# Patient Record
Sex: FEMALE | Race: WHITE | NOT HISPANIC OR LATINO | Employment: FULL TIME | URBAN - NONMETROPOLITAN AREA
[De-identification: names, ages, dates, MRNs, and addresses within clinical notes are randomized per-mention and may not be internally consistent; named-entity substitution may affect disease eponyms.]

---

## 2017-01-09 ENCOUNTER — OFFICE VISIT (OUTPATIENT)
Dept: PSYCHOLOGY | Facility: OTHER | Age: 37
End: 2017-01-09
Attending: MARRIAGE & FAMILY THERAPIST
Payer: COMMERCIAL

## 2017-01-09 DIAGNOSIS — F43.23 ADJUSTMENT DISORDER WITH MIXED ANXIETY AND DEPRESSED MOOD: Primary | ICD-10-CM

## 2017-01-09 PROCEDURE — 90847 FAMILY PSYTX W/PT 50 MIN: CPT | Performed by: MARRIAGE & FAMILY THERAPIST

## 2017-01-09 ASSESSMENT — ANXIETY QUESTIONNAIRES
5. BEING SO RESTLESS THAT IT IS HARD TO SIT STILL: SEVERAL DAYS
IF YOU CHECKED OFF ANY PROBLEMS ON THIS QUESTIONNAIRE, HOW DIFFICULT HAVE THESE PROBLEMS MADE IT FOR YOU TO DO YOUR WORK, TAKE CARE OF THINGS AT HOME, OR GET ALONG WITH OTHER PEOPLE: NOT DIFFICULT AT ALL
3. WORRYING TOO MUCH ABOUT DIFFERENT THINGS: NOT AT ALL
7. FEELING AFRAID AS IF SOMETHING AWFUL MIGHT HAPPEN: NOT AT ALL
1. FEELING NERVOUS, ANXIOUS, OR ON EDGE: NOT AT ALL
2. NOT BEING ABLE TO STOP OR CONTROL WORRYING: NOT AT ALL
6. BECOMING EASILY ANNOYED OR IRRITABLE: SEVERAL DAYS
GAD7 TOTAL SCORE: 3

## 2017-01-09 ASSESSMENT — PATIENT HEALTH QUESTIONNAIRE - PHQ9: 5. POOR APPETITE OR OVEREATING: SEVERAL DAYS

## 2017-01-09 NOTE — PROGRESS NOTES
Progress Note    Client Name: Etta Obrien  Date: January 9, 2017         Service Type: Couple      Session Start Time: 9:00 am  Session End Time: 9:59 am      Session Length: 59 minutes     Session #: 10     Attendees: Client and Spouse / Significant Other     DSM V Dx:Adjustment Disorders  309.28 (F43.23) With mixed anxiety and depressed mood              DA Date:9/14/2016  PHQ-9 :   PHQ-9 SCORE 12/8/2016 12/19/2016 1/9/2017   Total Score 1 3 1      ARNALDO-7 :    ARNALDO-7 SCORE 12/8/2016 12/19/2016 1/9/2017   Total Score 6 6 3           DATA      Progress Since Last Session (Related to Symptoms / Goals / Homework):   Symptoms: Worsening    Homework: Completed in session     Current / Ongoing Stressors and Concerns:   Reported conflicts related to incidents in relationship, family stressors     Treatment Objective(s) Addressed in This Session:   Objective A  -In process                 Client and family will learn and demonstrate emotion regulation skills and use coping skills as needed using a scale they create to self-monitor emotions.    Objective B -In process  Client and family  will learn and demonstrate effective communication skills to share thoughts and feelings  in sessions. They will use these skills at home to express needs.  Objective C  -In process  Client and family will process life changes using emotion regulation skills and communication skills to increase trust in relationships     Intervention:   Prompts on communication skills,discussion of relationship patterns and processing stressors. Prompts on thinking patterns and negative thought patterns including review of reframing hurtful language and listening skills with empathy.     Response to Interventions:   Etta and her spouse Austin presented at the [facility] for a family session. They were able to practice emotion regulation skills in session with prompts from the therapist, and practice  "communication skills from past sessions. They were not able to process one area of stress from an incident even with prompts on communication skills and continued discussion empathy. Etta left the session early reporting \"maybe we need to separate\" Austin was able to process emotions and reflect on coping skills after she left the session.      ASSESSMENT: Current Emotional / Mental Status (status of significant symptoms):   Risk status (Self / Other harm or suicidal ideation)   Client denies current fears or concerns for personal safety.   Client denies current or recent suicidal ideation or behaviors.   Client denies current or recent homicidal ideation or behaviors.   Client denies current or recent self injurious behavior or ideation.   Client denies other safety concerns.   A safety and risk management plan has not been developed at this time, however client was given the after-hours number / 911 should there be a change in any of these risk factors.     Appearance:   Appropriate    Eye Contact:   Poor   Psychomotor Behavior: Agitated  Restless    Attitude:   Cooperative    Orientation:   All   Speech    Rate / Production: Normal     Volume:  Loud    Mood:    Angry  Irritable  Sad    Affect:    Constricted    Thought Content:  Rumination    Thought Form:  Obsessive    Insight:    Fair         Medication Compliance:   Yes     Changes in Health Issues:   None reported     Collateral Reports Completed:   Not Applicable    PLAN: (Client Tasks / Therapist Tasks / Other)  Continue therapy with a focus on communication.     LINA Franco    "

## 2017-01-10 ENCOUNTER — OFFICE VISIT (OUTPATIENT)
Dept: PSYCHOLOGY | Facility: OTHER | Age: 37
End: 2017-01-10
Attending: SOCIAL WORKER
Payer: COMMERCIAL

## 2017-01-10 DIAGNOSIS — F43.23 ADJUSTMENT DISORDER WITH MIXED ANXIETY AND DEPRESSED MOOD: Primary | ICD-10-CM

## 2017-01-10 PROCEDURE — 90837 PSYTX W PT 60 MINUTES: CPT | Performed by: SOCIAL WORKER

## 2017-01-10 ASSESSMENT — ANXIETY QUESTIONNAIRES
1. FEELING NERVOUS, ANXIOUS, OR ON EDGE: SEVERAL DAYS
GAD7 TOTAL SCORE: 3
GAD7 TOTAL SCORE: 3
5. BEING SO RESTLESS THAT IT IS HARD TO SIT STILL: NOT AT ALL
3. WORRYING TOO MUCH ABOUT DIFFERENT THINGS: NOT AT ALL
IF YOU CHECKED OFF ANY PROBLEMS ON THIS QUESTIONNAIRE, HOW DIFFICULT HAVE THESE PROBLEMS MADE IT FOR YOU TO DO YOUR WORK, TAKE CARE OF THINGS AT HOME, OR GET ALONG WITH OTHER PEOPLE: NOT DIFFICULT AT ALL
2. NOT BEING ABLE TO STOP OR CONTROL WORRYING: SEVERAL DAYS
7. FEELING AFRAID AS IF SOMETHING AWFUL MIGHT HAPPEN: NOT AT ALL
6. BECOMING EASILY ANNOYED OR IRRITABLE: SEVERAL DAYS

## 2017-01-10 ASSESSMENT — PATIENT HEALTH QUESTIONNAIRE - PHQ9
5. POOR APPETITE OR OVEREATING: NOT AT ALL
SUM OF ALL RESPONSES TO PHQ QUESTIONS 1-9: 1

## 2017-01-12 NOTE — PROGRESS NOTES
"                                           Progress Note    Client Name: Etta Obrien  Date: January 10, 2017         Service Type: Individual      Session Start Time: 8:30  Session End Time: 9:23      Session Length: 53 minutes     Session #: 4     Attendees: Client attended alone    DSM V Dx:Adjustment Disorders  309.28 (F43.23) With mixed anxiety and depressed mood  DA Date:9/14/2016    Treatment Plan Due: next visit  PHQ-9 :   PHQ-9 SCORE 12/19/2016 1/9/2017 1/10/2017   Total Score 3 1 2      ARNALDO-7 :    ARNALDO-7 SCORE 12/19/2016 1/9/2017 1/10/2017   Total Score 6 3 3           DATA      Progress Since Last Session (Related to Symptoms / Goals / Homework):   Symptoms: somewhat improved mood symptoms, somewhat worsening communication/relationship issues    Homework: Partially completed     Current / Ongoing Stressors and Concerns:   Etta was tearful today as she explained she does not believe she is getting what she needs out of her marriage.  She does report that things are \"fine\" at home if they are not talking about anything she feels is important such as feelings. Etta further explained that this is why she feels she is not getting her needs met or having her thoughts and feelings validated.     Treatment Objective(s) Addressed in This Session:   Objective A  -In process                 Client and family will learn and demonstrate emotion regulation skills and use coping skills as needed using a scale they create to self-monitor emotions.    Objective B -In process  Client and family  will learn and demonstrate effective communication skills to share thoughts and feelings  in sessions. They will use these skills at home to express needs.     Intervention:   Provided psycho-education utilizing CBT-cognitive restructuring. Explored with Etta why she believes there is a communication breakdown in her home and reviewed interpersonal skill techniques that she reported were helpful in the past.  Encouraged further " use of these skills at home     Response to Interventions:   Etta stated that she understood the information that was discussed today and that she would attempt to put in place at home.     ASSESSMENT: Current Emotional / Mental Status (status of significant symptoms):   Risk status (Self / Other harm or suicidal ideation)   Client denies current fears or concerns for personal safety.   Client denies current or recent suicidal ideation or behaviors.   Client denies current or recent homicidal ideation or behaviors.   Client denies current or recent self injurious behavior or ideation.   Client denies other safety concerns.   A safety and risk management plan has not been developed at this time, however client was given the after-hours number / 911 should there be a change in any of these risk factors.     Appearance:   Appropriate    Eye Contact:   Good    Psychomotor Behavior: Normal    Attitude:   Cooperative    Orientation:   All   Speech    Rate / Production: Normal     Volume:  Normal    Mood:    Sad    Affect:    Worrisome  tearful    Thought Content:  Rumination    Thought Form:  Circumstantial   Insight:    Fair         Medication Compliance:   Yes     Changes in Health Issues:   None reported     Chemical Use Review:   Substance Use: Chemical use reviewed, no active concerns identified      Tobacco Use: No current tobacco use.       Collateral Reports Completed:   Not Applicable    PLAN: (Client Tasks / Therapist Tasks / Other)  Etta was asked to work on these skills at home and to return for follow up appointments.    Lilly Hinkle [facility] Hospital[facility]

## 2017-01-13 ASSESSMENT — ANXIETY QUESTIONNAIRES: GAD7 TOTAL SCORE: 3

## 2017-01-13 ASSESSMENT — PATIENT HEALTH QUESTIONNAIRE - PHQ9: SUM OF ALL RESPONSES TO PHQ QUESTIONS 1-9: 2

## 2017-01-25 ENCOUNTER — OFFICE VISIT (OUTPATIENT)
Dept: OBGYN | Facility: OTHER | Age: 37
End: 2017-01-25
Attending: OBSTETRICS & GYNECOLOGY
Payer: COMMERCIAL

## 2017-01-25 VITALS
DIASTOLIC BLOOD PRESSURE: 76 MMHG | HEART RATE: 80 BPM | SYSTOLIC BLOOD PRESSURE: 118 MMHG | BODY MASS INDEX: 28.88 KG/M2 | WEIGHT: 163 LBS | HEIGHT: 63 IN

## 2017-01-25 DIAGNOSIS — Z00.00 ROUTINE GENERAL MEDICAL EXAMINATION AT A HEALTH CARE FACILITY: Primary | ICD-10-CM

## 2017-01-25 DIAGNOSIS — Z23 NEED FOR PROPHYLACTIC VACCINATION AND INOCULATION AGAINST INFLUENZA: ICD-10-CM

## 2017-01-25 DIAGNOSIS — Z20.2 EXPOSURE TO STD: ICD-10-CM

## 2017-01-25 DIAGNOSIS — Z30.09 FAMILY PLANNING: ICD-10-CM

## 2017-01-25 DIAGNOSIS — Z12.4 SCREENING FOR CERVICAL CANCER: ICD-10-CM

## 2017-01-25 LAB
MICRO REPORT STATUS: NORMAL
SPECIMEN SOURCE: NORMAL
WET PREP SPEC: NORMAL

## 2017-01-25 PROCEDURE — 87210 SMEAR WET MOUNT SALINE/INK: CPT | Performed by: OBSTETRICS & GYNECOLOGY

## 2017-01-25 PROCEDURE — 87624 HPV HI-RISK TYP POOLED RSLT: CPT | Mod: 90 | Performed by: OBSTETRICS & GYNECOLOGY

## 2017-01-25 PROCEDURE — 88142 CYTOPATH C/V THIN LAYER: CPT | Performed by: OBSTETRICS & GYNECOLOGY

## 2017-01-25 PROCEDURE — 90471 IMMUNIZATION ADMIN: CPT | Performed by: OBSTETRICS & GYNECOLOGY

## 2017-01-25 PROCEDURE — 90686 IIV4 VACC NO PRSV 0.5 ML IM: CPT | Performed by: OBSTETRICS & GYNECOLOGY

## 2017-01-25 PROCEDURE — 99395 PREV VISIT EST AGE 18-39: CPT | Mod: 25 | Performed by: OBSTETRICS & GYNECOLOGY

## 2017-01-25 PROCEDURE — 99000 SPECIMEN HANDLING OFFICE-LAB: CPT | Performed by: OBSTETRICS & GYNECOLOGY

## 2017-01-25 RX ORDER — NORETHINDRONE ACETATE AND ETHINYL ESTRADIOL 1MG-20(21)
KIT ORAL
Qty: 84 TABLET | Refills: 6 | Status: SHIPPED | OUTPATIENT
Start: 2017-01-25 | End: 2017-02-25

## 2017-01-25 ASSESSMENT — ANXIETY QUESTIONNAIRES
1. FEELING NERVOUS, ANXIOUS, OR ON EDGE: NOT AT ALL
2. NOT BEING ABLE TO STOP OR CONTROL WORRYING: NOT AT ALL
GAD7 TOTAL SCORE: 3
7. FEELING AFRAID AS IF SOMETHING AWFUL MIGHT HAPPEN: NOT AT ALL
IF YOU CHECKED OFF ANY PROBLEMS ON THIS QUESTIONNAIRE, HOW DIFFICULT HAVE THESE PROBLEMS MADE IT FOR YOU TO DO YOUR WORK, TAKE CARE OF THINGS AT HOME, OR GET ALONG WITH OTHER PEOPLE: NOT DIFFICULT AT ALL
6. BECOMING EASILY ANNOYED OR IRRITABLE: SEVERAL DAYS
5. BEING SO RESTLESS THAT IT IS HARD TO SIT STILL: NOT AT ALL
3. WORRYING TOO MUCH ABOUT DIFFERENT THINGS: SEVERAL DAYS

## 2017-01-25 ASSESSMENT — PATIENT HEALTH QUESTIONNAIRE - PHQ9: 5. POOR APPETITE OR OVEREATING: SEVERAL DAYS

## 2017-01-25 NOTE — PROGRESS NOTES
Injectable Influenza Immunization Documentation    1.  Is the person to be vaccinated sick today?  No    2. Does the person to be vaccinated have an allergy to eggs or to a component of the vaccine?  No    3. Has the person to be vaccinated today ever had a serious reaction to influenza vaccine in the past?  No    4. Has the person to be vaccinated ever had Guillain-[place] syndrome?  No     Form completed by Elizabeth Vázquez

## 2017-01-25 NOTE — MR AVS SNAPSHOT
After Visit Summary   1/25/2017    Etta Obrien    MRN: [id number]           Patient Information     Date Of Birth          1980        Visit Information        Provider Department      1/25/2017 3:00 PM Ellie Leal MD [facility]        Today's Diagnoses     Routine general medical examination at a health care facility    -  1     Family planning         Screening for cervical cancer         Exposure to STD           Care Instructions    Standing order placed for cervical cultures.  Pap smear and wet prep done today.  Return for annual exam.                      Follow-ups after your visit        Your next 10 appointments already scheduled     Jan 26, 2017 10:00 AM   (Arrive by 9:45 AM)   Return Visit with Winsome Dennis [facility]   [facility]    [address] [place] [id number] [phone]            Feb 06, 2017  8:30 AM   (Arrive by 8:15 AM)   Return Visit with Lilly Hinkle [facility]   [facility])    [address] [place] [id number] [phone]              Future tests that were ordered for you today     Open Standing Orders        Priority Remaining Interval Expires Ordered    NEISSERIA GONORRHOEA PCR Routine 20/20 1/25/2018 1/25/2017    CHLAMYDIA TRACHOMATIS PCR Routine 20/20 1/25/2018 1/25/2017            Who to contact     If you have questions or need follow up information about today's clinic visit or your schedule please contact [facility] directly at [phone].  Normal or non-critical lab and imaging results will be communicated to you by MyChart, letter or phone within 4 business days after the clinic has received the results. If you do not hear from us within 7 days, please contact the clinic through MyChart or phone. If you have a critical or abnormal lab result, we will notify you by phone as soon as possible.  Submit refill requests through [facility]  "or call your pharmacy and they will forward the refill request to us. Please allow 3 business days for your refill to be completed.          Additional Information About Your Visit        MyChart Information     [employer] gives you secure access to your electronic health record. If you see a primary care provider, you can also send messages to your care team and make appointments. If you have questions, please call your primary care clinic.  If you do not have a primary care provider, please call [phone] and they will assist you.        Care EveryWhere ID     This is your Care EveryWhere ID. This could be used by other organizations to access your [facility] medical records  [id number]        Your Vitals Were     Pulse Height BMI (Body Mass Index)             80 5' 3\" (1.6 m) 28.88 kg/m2          Blood Pressure from Last 3 Encounters:   01/25/17 118/76   10/25/16 120/64   09/27/16 108/74    Weight from Last 3 Encounters:   01/25/17 163 lb (73.936 kg)   10/25/16 165 lb (74.844 kg)   09/27/16 167 lb (75.751 kg)              We Performed the Following     A pap thin layer screen with  HPV - recommended age 30 - 65 years (select HPV order below)     HPV High Risk Types DNA Cervical     Wet prep          Where to get your medicines      These medications were sent to [place] [address] [place] [address] [address]     Phone:  [phone]    - norethindrone-ethinyl estradiol 1-20 MG-MCG per tablet       Primary Care Provider Office Phone # Fax #    Ashley Allen [phone]       [address] [address]        Thank you!     [facility] you [facility]  [facility]. Our goal is always to provide you with excellent care. Hearing back from our patients is one way we can continue to improve our services. Please take a few minutes to complete the written survey that you may receive in " the mail after your visit with us. Thank you!             Your Updated Medication List - Protect others around you: Learn how to safely use, store and throw away your medicines at www.disposemymeds.org.          This list is accurate as of: 1/25/17  3:34 PM.  Always use your most recent med list.                   Brand Name Dispense Instructions for use    BuPROPion HCl ER (XL) 450 MG Tb24     30 tablet    Take 450 mg by mouth every morning       LEVOTHYROXINE SODIUM PO      Take 75 mcg by mouth       norethindrone-ethinyl estradiol 1-20 MG-MCG per tablet    MICROGESTIN FE 1/20    84 tablet    actvive pill daily for amenorrhea

## 2017-01-25 NOTE — PROGRESS NOTES
ANNUAL    Etta is a 36 year old  female who presents for annual exam.   yc 15m   LC 7#1  Gdm y hpt n   No LMP recorded. Patient is not currently having periods (Reason: Birth Control).  Menses: rhonda  pain na Contraception rhonda BTB.  Planning pregnancy: n  Concerns in addition to routine health maintenance?  attagirl!.  GYNECOLOGIC HISTORY:   Surgery: n  History sexually transmitted infections:No STD history   Safe?  y  STI testing offered?  y  History of abnormal Pap smear: n  Problems with sex? n  Family history of: breast CA: mgm   Uterine n ovarian CA: n   colon CA: n    HEALTH MAINTENANCE:  Cholesterol: (  CHOLESTEROL   Date Value Ref Range Status   2016 168 <200 mg/dL Final    History of abnormal lipids:   Mammo: n . History of abnormal Mammo:n   Regular Self Breast Exams: y Calcium/Vitamin D or Vit: n  Exercise n    TSH: (  TSH   Date Value Ref Range Status   10/13/2015 2.81 0.40 - 4.00 mU/L Final    )  Pap; (PAP      NIL   2016  PAP      NIL   2013 )    HISTORY:  Obstetric History       T1      TAB0   SAB0   E0   M0   L2       # Outcome Date GA Lbr Wilber/2nd Weight Sex Delivery Anes PTL Lv   2 Term 10/13/15 38w0d 05:47 / 00:20 7 lb 1.2 oz (3.209 kg) M Vag-Spont TOPICAL,Local,EPI N Y      Name: [place]      Apgar1:  9                Apgar5: 9   1  12/05/10 36w4d 03:50 / 00:17 5 lb 13 oz (2.637 kg) F Vag-Vacuum INT Y Y      Name: Jenny      Complications:  labor,Fetal bradycardia      Apgar1:  4                Apgar5: 5        Past Medical History   Diagnosis Date     Unspecified hypothyroidism      PCOS (polycystic ovarian syndrome)      Constipation, chronic      Gestational diabetes      Hirsutism      Spina bifida occulta      Past Surgical History   Procedure Laterality Date     Dental surgical procedure       Laparoscopic tubal dye study N/A 9/3/2014     Procedure: LAPAROSCOPIC TUBAL DYE STUDY;  Surgeon: Amanuel Purcell MD;  Location: [facility] OR     Family History    Problem Relation Age of Onset     Lipids Father      hyperlipidemia     Hypertension Father      Lipids Mother      hyperlipidemia     Lupus Mother      erythematosus     DIABETES Other      Thyroid Disease Other      Asthma No family hx of      Social History     Social History     Marital Status:      Spouse Name: N/A     Number of Children: N/A     Years of Education: N/A     Social History Main Topics     Smoking status: Never Smoker      Smokeless tobacco: Never Used     Alcohol Use: No     Drug Use: No     Sexual Activity:     Partners: Male     Other Topics Concern     Parent/Sibling W/ Cabg, Mi Or Angioplasty Before 65f 55m? No     Social History Narrative       Current outpatient prescriptions:      buPROPion 450 MG TB24, Take 450 mg by mouth every morning, Disp: 30 tablet, Rfl: 2     norethindrone-ethinyl estradiol (MICROGESTIN FE 1/20) 1-20 MG-MCG per tablet, actvive pill daily for amenorrhea, Disp: 84 tablet, Rfl: 6     LEVOTHYROXINE SODIUM PO, Take 75 mcg by mouth, Disp: , Rfl:      Allergies   Allergen Reactions     Macrolides Hives     Macrolide antibiotics     Penicillins Hives     Sulfonamide Derivatives Hives     Sulfa       Past medical, surgical, social and family history were reviewed and updated in EPIC.    ROS:    C:     NEGATIVE for fever, chills, change in weight  I:       NEGATIVE for worrisome rashes, moles or lesions  E:     NEGATIVE for vision changes or irritation  E/M: NEGATIVE for ear, mouth and throat problems  R:     NEGATIVE for significant cough or SOB  CV:   NEGATIVE for chest pain, palpitations or peripheral edema  GI:     NEGATIVE for nausea, abdominal pain, heartburn, or change in bowel habits  :   NEGATIVE for frequency, dysuria, hematuria, vaginal discharge, or irregular bleeding,incontinence   M:     NEGATIVE for significant arthralgias or myalgia  N:      NEGATIVE for weakness, dizziness or paresthesias  E:      NEGATIVE for temperature intolerance, skin/hair  "changes  P:      NEGATIVE for changes in mood or affect.     EXAM:   /76 mmHg  Pulse 80  Ht 5' 3\" (1.6 m)  Wt 163 lb (73.936 kg)  BMI 28.88 kg/m2   BMI: Body mass index is 28.88 kg/(m^2).  Constitutional: healthy, alert and no distress  Head: Normocephalic. No masses, lesions, tenderness or abnormalities  Neck: Neck supple. Trachea midline. No adenopathy. Thyroid symmetric, normal size.   Cardiovascular: RRR.   Respiratory: lungs clear   Breast: Breasts reveal mild symmetric fibrocystic densities, but there are no dominant, discrete, fixed or suspicious masses found.  Gastrointestinal: Abdomen soft, non-tender, non-distended. No masses, organomegaly.  :  Vulva:  No external lesions, normal female hair distribution, no inguinal adenopathy.    Urethra:  Midline, non-tender, well supported, no discharge  Vagina:  Moist, pink, no abnormal discharge, no lesions  Cervix: clean   Uterus:   Normal size,  , non-tender, freely mobile  Ovaries:  No masses appreciated  Rectal Exam: not done  Musculoskeletal: extremities normal  Skin: no suspicious lesions or rashes  Psychiatric: Affect appropriate, cooperative,mentation appears normal.     COUNSELING:   regular exercise  healthy diet/nutrition  Immunizations   contraception  family planning  Folic Acid Counseling     reports that she has never smoked. She has never used smokeless tobacco.  Tobacco Cessation Action Plan: n   Body mass index is 28.88 kg/(m^2).  Weight management plan: na  FRAX Risk Assessment    ASSESSMENT:  36 year old female with satisfactory annual exam  exposure  PLAN:   Standing order for urine gc,ct  Pap with HR hpv  mammogram na  CheckMIIC  rto 1 year   Flu shot    Greater than 0 minutes spent discussing other than annual     Ellie Leal MD      "

## 2017-01-25 NOTE — NURSING NOTE
"Chief Complaint   Patient presents with     Gyn Exam       Initial /76 mmHg  Pulse 80  Ht 5' 3\" (1.6 m)  Wt 163 lb (73.936 kg)  BMI 28.88 kg/m2 Estimated body mass index is 28.88 kg/(m^2) as calculated from the following:    Height as of this encounter: 5' 3\" (1.6 m).    Weight as of this encounter: 163 lb (73.936 kg).  BP completed using cuff size: jigar Johnson      "

## 2017-01-25 NOTE — PATIENT INSTRUCTIONS
Standing order placed for cervical cultures.  Pap smear and wet prep done today.  Return for annual exam.

## 2017-01-26 ENCOUNTER — OFFICE VISIT (OUTPATIENT)
Dept: PSYCHOLOGY | Facility: OTHER | Age: 37
End: 2017-01-26
Attending: MARRIAGE & FAMILY THERAPIST
Payer: COMMERCIAL

## 2017-01-26 DIAGNOSIS — F43.23 ADJUSTMENT DISORDER WITH MIXED ANXIETY AND DEPRESSED MOOD: Primary | ICD-10-CM

## 2017-01-26 PROCEDURE — 90847 FAMILY PSYTX W/PT 50 MIN: CPT | Performed by: MARRIAGE & FAMILY THERAPIST

## 2017-01-26 ASSESSMENT — ANXIETY QUESTIONNAIRES
5. BEING SO RESTLESS THAT IT IS HARD TO SIT STILL: NOT AT ALL
3. WORRYING TOO MUCH ABOUT DIFFERENT THINGS: NOT AT ALL
GAD7 TOTAL SCORE: 1
IF YOU CHECKED OFF ANY PROBLEMS ON THIS QUESTIONNAIRE, HOW DIFFICULT HAVE THESE PROBLEMS MADE IT FOR YOU TO DO YOUR WORK, TAKE CARE OF THINGS AT HOME, OR GET ALONG WITH OTHER PEOPLE: NOT DIFFICULT AT ALL
2. NOT BEING ABLE TO STOP OR CONTROL WORRYING: NOT AT ALL
1. FEELING NERVOUS, ANXIOUS, OR ON EDGE: NOT AT ALL
GAD7 TOTAL SCORE: 3
7. FEELING AFRAID AS IF SOMETHING AWFUL MIGHT HAPPEN: NOT AT ALL
6. BECOMING EASILY ANNOYED OR IRRITABLE: SEVERAL DAYS

## 2017-01-26 ASSESSMENT — PATIENT HEALTH QUESTIONNAIRE - PHQ9
SUM OF ALL RESPONSES TO PHQ QUESTIONS 1-9: 3
5. POOR APPETITE OR OVEREATING: NOT AT ALL

## 2017-01-26 NOTE — PROGRESS NOTES
Progress Note    Client Name: Etta Obrien  Date: January 26, 2017         Service Type: Couple      Session Start Time: 10:10 am  Session End Time: 10:59 am      Session Length: 49 minutes     Session #: 11     Attendees: Client and Spouse / Significant Other     DSM V Dx:Adjustment Disorders  309.28 (F43.23) With mixed anxiety and depressed mood              DA Date:9/14/2016  PHQ-9 :   PHQ-9 SCORE 1/10/2017 1/25/2017 1/26/2017   Total Score 2 3 3      ARNALDO-7 :    ARNALDO-7 SCORE 1/10/2017 1/25/2017 1/26/2017   Total Score 3 3 1           DATA      Progress Since Last Session (Related to Symptoms / Goals / Homework):   Symptoms: Stable    Homework: Achieved / completed to satisfaction     Current / Ongoing Stressors and Concerns:   Relationship concerns, conflicts     Treatment Objective(s) Addressed in This Session:   Objective A  -In process                 Client and family will learn and demonstrate emotion regulation skills and use coping skills as needed using a scale they create to self-monitor emotions.    Objective B -In process  Client and family  will learn and demonstrate effective communication skills to share thoughts and feelings  in sessions. They will use these skills at home to express needs.  Objective C  -In process  Client and family will process life changes using emotion regulation skills and communication skills to increase trust in relationships     Intervention:   Prompts on communication skills, identification of patterns and processing stressors.      Response to Interventions:   Etta and her spouse Austin presented at the [facility] for a family session. They were able to practice emotion regulation skills in session with prompts from the therapist, and practice communication skills from past sessions. They were able to process one area of stress and discus patterns that they want to change interactions.      ASSESSMENT: Current  Emotional / Mental Status (status of significant symptoms):   Risk status (Self / Other harm or suicidal ideation)   Client denies current fears or concerns for personal safety.   Client denies current or recent suicidal ideation or behaviors.   Client denies current or recent homicidal ideation or behaviors.   Client denies current or recent self injurious behavior or ideation.   Client denies other safety concerns.   A safety and risk management plan has not been developed at this time, however client was given the after-hours number / 911 should there be a change in any of these risk factors.     Appearance:   Appropriate    Eye Contact:   Good    Psychomotor Behavior: Normal    Attitude:   Cooperative    Orientation:   All   Speech    Rate / Production: Normal     Volume:  Normal    Mood:    Sad    Affect:    Appropriate    Thought Content:  Rumination    Thought Form:  Goal Directed  Logical    Insight:    Good         Medication Compliance:   Yes     Changes in Health Issues:   None reported     Chemical Use Review:   Substance Use: Chemical use reviewed, no active concerns identified      Tobacco Use: No current tobacco use.       Collateral Reports Completed:   Not Applicable    PLAN: (Client Tasks / Therapist Tasks / Other)  Continue therapy with a focus on communication skills to reduce depressive and anxiety symptoms.     LINA Franco

## 2017-01-27 ASSESSMENT — ANXIETY QUESTIONNAIRES: GAD7 TOTAL SCORE: 1

## 2017-01-27 ASSESSMENT — PATIENT HEALTH QUESTIONNAIRE - PHQ9: SUM OF ALL RESPONSES TO PHQ QUESTIONS 1-9: 3

## 2017-02-01 LAB
COPATH REPORT: NORMAL
PAP: NORMAL

## 2017-02-03 LAB
FINAL DIAGNOSIS: NORMAL
HPV HR 12 DNA CVX QL NAA+PROBE: NEGATIVE
HPV16 DNA SPEC QL NAA+PROBE: NEGATIVE
HPV18 DNA SPEC QL NAA+PROBE: NEGATIVE
SPECIMEN DESCRIPTION: NORMAL

## 2017-02-06 ENCOUNTER — OFFICE VISIT (OUTPATIENT)
Dept: PSYCHOLOGY | Facility: OTHER | Age: 37
End: 2017-02-06
Attending: SOCIAL WORKER
Payer: COMMERCIAL

## 2017-02-06 DIAGNOSIS — F43.23 ADJUSTMENT DISORDER WITH MIXED ANXIETY AND DEPRESSED MOOD: Primary | ICD-10-CM

## 2017-02-06 PROCEDURE — 90837 PSYTX W PT 60 MINUTES: CPT | Performed by: SOCIAL WORKER

## 2017-02-06 ASSESSMENT — ANXIETY QUESTIONNAIRES
IF YOU CHECKED OFF ANY PROBLEMS ON THIS QUESTIONNAIRE, HOW DIFFICULT HAVE THESE PROBLEMS MADE IT FOR YOU TO DO YOUR WORK, TAKE CARE OF THINGS AT HOME, OR GET ALONG WITH OTHER PEOPLE: NOT DIFFICULT AT ALL
5. BEING SO RESTLESS THAT IT IS HARD TO SIT STILL: NOT AT ALL
6. BECOMING EASILY ANNOYED OR IRRITABLE: SEVERAL DAYS
3. WORRYING TOO MUCH ABOUT DIFFERENT THINGS: NOT AT ALL
7. FEELING AFRAID AS IF SOMETHING AWFUL MIGHT HAPPEN: NOT AT ALL
GAD7 TOTAL SCORE: 1
1. FEELING NERVOUS, ANXIOUS, OR ON EDGE: NOT AT ALL
2. NOT BEING ABLE TO STOP OR CONTROL WORRYING: NOT AT ALL

## 2017-02-06 ASSESSMENT — PATIENT HEALTH QUESTIONNAIRE - PHQ9: 5. POOR APPETITE OR OVEREATING: NOT AT ALL

## 2017-02-15 DIAGNOSIS — Z87.51 H/O PREMATURE DELIVERY: ICD-10-CM

## 2017-02-15 DIAGNOSIS — E03.9 HYPOTHYROIDISM, UNSPECIFIED TYPE: Primary | ICD-10-CM

## 2017-02-15 NOTE — LETTER
[facility]  [address]  [place] [address]  [phone]      March 10, 2017      Etta Obrien  [address] [address]        Dear Etta,      You are due for your thryoid test(s) in the lab. You may come in anytime during clinic hours for your lab, Monday-Friday 7:30am-5:00pm. Your order is already in the lab and you do not need to call ahead for an appointment. If you have any questions about your test(s), please call the nurse at [phone].      Sincerely,        Ellie Leal MD/Analisa LEE LPN

## 2017-02-15 NOTE — LETTER
[facility]  [address]  [facility] [address]  [phone]      April 12, 2017      Etta Obrien  [address]  [place] [address]        Dear Etta,      You are due for your TSH (thyroid) test(s) in the lab. You may come in anytime during lab hours which are Monday-Friday (except major holidays) 7:30am-5:00pm at the [facility]; 8:00-5:00 at the [facility] or 8:15-11:45 and 1:15-4:30 at the [facility]. Your order is already in the lab and you do not need to call ahead for an appointment. You do not need to be fasting for this test. If you have any questions about your test(s), please call the nurse at [phone].      Sincerely,      Ellie Leal MD/Analisa LEE LPN

## 2017-02-16 NOTE — PROGRESS NOTES
Progress Note    Client Name: Etta Obrien  Date: February 6, 2017         Service Type: Individual      Session Start Time: 8:23  Session End Time: 9:24      Session Length: 61 minutes     Session #: 5     Attendees: Client attended alone    DSM V Dx:Adjustment Disorders  309.28 (F43.23) With mixed anxiety and depressed mood  DA Date:9/14/2016    Treatment Plan Due: next visit  PHQ-9 :   PHQ-9 SCORE 1/25/2017 1/26/2017 2/6/2017   Total Score 3 3 0      ARNALDO-7 :    ARNALDO-7 SCORE 1/25/2017 1/26/2017 2/6/2017   Total Score 3 1 1           DATA      Progress Since Last Session (Related to Symptoms / Goals / Homework):   Symptoms: somewhat improving    Homework: Achieved / completed to satisfaction     Current / Ongoing Stressors and Concerns:  Etta reports that she is doing really well today. She explained that she has been learning how to do something about the way she is feeling when it becomes a maladaptive thinking process.  Etta also reports that her and her  continue to work on communication skills.     Treatment Objective(s) Addressed in This Session:   Objective A  -In process                 Client and family will learn and demonstrate emotion regulation skills and use coping skills as needed using a scale they create to self-monitor emotions.     Intervention:   Provided psycho-education utilizing CBT-cognitive restructuring. Reinforced cognitive restructuring skills and how to incorporate these in her daily life.     Response to Interventions:   Etta stated she understood this information.  She also reports that she has been able to incorporate positive coping skills into her daily routines.  Etta reports that she is putting less blame on herself and has been working on her negative thought processes.     ASSESSMENT: Current Emotional / Mental Status (status of significant symptoms):   Risk status (Self / Other harm or suicidal ideation)   Client denies  current fears or concerns for personal safety.   Client denies current or recent suicidal ideation or behaviors.   Client denies current or recent homicidal ideation or behaviors.   Client denies current or recent self injurious behavior or ideation.   Client denies other safety concerns.   A safety and risk management plan has not been developed at this time, however client was given the after-hours number / 911 should there be a change in any of these risk factors.     Appearance:   Appropriate    Eye Contact:   Good    Psychomotor Behavior: Normal    Attitude:   Cooperative    Orientation:   All   Speech    Rate / Production: Normal     Volume:  Normal    Mood:    Sad    Affect:    Worrisome  tearful    Thought Content:  Rumination    Thought Form:  Circumstantial   Insight:    Fair         Medication Compliance:   Yes     Changes in Health Issues:   None reported     Chemical Use Review:   Substance Use: Chemical use reviewed, no active concerns identified      Tobacco Use: No current tobacco use.       Collateral Reports Completed:   Not Applicable    PLAN: (Client Tasks / Therapist Tasks / Other)  Etta was asked to work on these skills at home and to return for follow up appointments.    Lilly Hinkle [facility]

## 2017-02-17 ASSESSMENT — PATIENT HEALTH QUESTIONNAIRE - PHQ9: SUM OF ALL RESPONSES TO PHQ QUESTIONS 1-9: 0

## 2017-02-17 ASSESSMENT — ANXIETY QUESTIONNAIRES: GAD7 TOTAL SCORE: 1

## 2017-02-20 RX ORDER — LEVOTHYROXINE SODIUM 75 UG/1
TABLET ORAL
Qty: 90 TABLET | Refills: 3 | Status: SHIPPED | OUTPATIENT
Start: 2017-02-20 | End: 2018-05-09

## 2017-02-20 NOTE — TELEPHONE ENCOUNTER
Refill request received for synthroid 75 mcg. Last TSH 2.81 10/2015. On this dose since 7/2015.    Would you like her to do a TSH first?  Please sign or decline RX in this encounter.

## 2017-03-21 ENCOUNTER — OFFICE VISIT (OUTPATIENT)
Dept: PSYCHOLOGY | Facility: OTHER | Age: 37
End: 2017-03-21
Attending: SOCIAL WORKER
Payer: COMMERCIAL

## 2017-03-21 DIAGNOSIS — F43.23 ADJUSTMENT DISORDER WITH MIXED ANXIETY AND DEPRESSED MOOD: Primary | ICD-10-CM

## 2017-03-21 PROCEDURE — 90837 PSYTX W PT 60 MINUTES: CPT | Performed by: SOCIAL WORKER

## 2017-03-21 ASSESSMENT — ANXIETY QUESTIONNAIRES
2. NOT BEING ABLE TO STOP OR CONTROL WORRYING: NOT AT ALL
GAD7 TOTAL SCORE: 3
IF YOU CHECKED OFF ANY PROBLEMS ON THIS QUESTIONNAIRE, HOW DIFFICULT HAVE THESE PROBLEMS MADE IT FOR YOU TO DO YOUR WORK, TAKE CARE OF THINGS AT HOME, OR GET ALONG WITH OTHER PEOPLE: NOT DIFFICULT AT ALL
6. BECOMING EASILY ANNOYED OR IRRITABLE: NOT AT ALL
5. BEING SO RESTLESS THAT IT IS HARD TO SIT STILL: NOT AT ALL
7. FEELING AFRAID AS IF SOMETHING AWFUL MIGHT HAPPEN: NOT AT ALL
1. FEELING NERVOUS, ANXIOUS, OR ON EDGE: SEVERAL DAYS
3. WORRYING TOO MUCH ABOUT DIFFERENT THINGS: SEVERAL DAYS

## 2017-03-21 ASSESSMENT — PATIENT HEALTH QUESTIONNAIRE - PHQ9: 5. POOR APPETITE OR OVEREATING: SEVERAL DAYS

## 2017-03-21 NOTE — MR AVS SNAPSHOT
After Visit Summary   3/21/2017    Etta Obrien    MRN: [id number]           Patient Information     Date Of Birth          1980        Visit Information        Provider Department      3/21/2017 7:30 AM Lilly Hinkle LICSW [place] [facility]        Today's Diagnoses     Adjustment disorder with mixed anxiety and depressed mood    -  1       Follow-ups after your visit        Your next 10 appointments already scheduled     Apr 25, 2017  8:30 AM CDT   (Arrive by 8:15 AM)   [facility]   [place] [facility] ([facility])    [address] [place] [address] [id number] [phone]              Who to contact     If you have questions or need follow up information about today's clinic visit or your schedule please contact [facility] directly at [phone].  Normal or non-critical lab and imaging results will be communicated to you by MyChart, letter or phone within 4 business days after the clinic has received the results. If you do not hear from us within 7 days, please contact the clinic through MyChart or phone. If you have a critical or abnormal lab result, we will notify you by phone as soon as possible.  Submit refill requests through [employer] or call your pharmacy and they will forward the refill request to us. Please allow 3 business days for your refill to be completed.          Additional Information About Your Visit        MyChart Information     [employer] gives you secure access to your electronic health record. If you see a primary care provider, you can also send messages to your care team and make appointments. If you have questions, please call your primary care clinic.  If you do not have a primary care provider, please call [phone] and they will assist you.        Care EveryWhere ID     This is your Care EveryWhere ID. This could be used by other organizations to access your [facility]  records  [id number]         Blood Pressure from Last 3 Encounters:   01/25/17 118/76   10/25/16 120/64   09/27/16 108/74    Weight from Last 3 Encounters:   01/25/17 163 lb (73.9 kg)   10/25/16 165 lb (74.8 kg)   09/27/16 167 lb (75.8 kg)              Today, you had the following     No orders found for display       Primary Care Provider Office Phone # Fax #    Ashley Allen [phone]       [address] [address]        Thank you!     Thank you for choosing [facility]  for your care. Our goal is always to provide you with excellent care. Hearing back from our patients is one way we can continue to improve our services. Please take a few minutes to complete the written survey that you may receive in the mail after your visit with us. Thank you!             Your Updated Medication List - Protect others around you: Learn how to safely use, store and throw away your medicines at www.disposemymeds.org.          This list is accurate as of: 3/21/17 11:59 PM.  Always use your most recent med list.                   Brand Name Dispense Instructions for use    BLISOVI FE 1/20 1-20 MG-MCG per tablet   Generic drug:  norethindrone-ethinyl estradiol     84 tablet    TAKE THE ACTIVE TABLET DAILY FOR AMENORRHEA.       * buPROPion 300 MG 24 hr tablet    WELLBUTRIN XL    30 tablet    Take 1 tablet (300 mg) by mouth every morning       * buPROPion 150 MG 24 hr tablet    WELLBUTRIN XL    30 tablet    Take 1 tablet (150 mg) by mouth every morning Take with 300 mg capsules for total of 450 mg daily       levothyroxine 75 MCG tablet    SYNTHROID/LEVOTHROID    90 tablet    TAKE 1 TABLET BY MOUTH ONCE DAILY       * Notice:  This list has 2 medication(s) that are the same as other medications prescribed for you. Read the directions carefully, and ask your doctor or other care provider to review them with you.

## 2017-03-26 NOTE — PROGRESS NOTES
Progress Note    Client Name: Etta Obrien  Date: March 21, 2017         Service Type: Individual      Session Start Time: 7:30  Session End Time: 8:25      Session Length: 55 minutes     Session #: 6     Attendees: Client attended alone    DSM V Dx:Adjustment Disorders  309.28 (F43.23) With mixed anxiety and depressed mood  DA Date:9/14/2016    Treatment Plan Due: next visit  PHQ-9 :   PHQ-9 SCORE 1/26/2017 2/6/2017 3/21/2017   Total Score 3 0 2      ARNALDO-7 :    ARNALDO-7 SCORE 1/26/2017 2/6/2017 3/21/2017   Total Score 1 1 3           DATA      Progress Since Last Session (Related to Symptoms / Goals / Homework):   Symptoms: somewhat improving    Homework: Achieved / completed to satisfaction     Current / Ongoing Stressors and Concerns:  Etta stated that she is feeling well today. She explained that there has been resolution to an interpersonal issue she has been dealing with. However, even with her resolution, she discussed grief and loss over the situation and how this has been affecting her.     Treatment Objective(s) Addressed in This Session:   Objective A  -In process                 Client and family will learn and demonstrate emotion regulation skills and use coping skills as needed using a scale they create to self-monitor emotions.     Intervention:   Provided psycho-education utilizing CBT-cognitive restructuring. Also discussed grief and loss coping skills.     Response to Interventions:   Etta stated she understood this information.  Etta states that she continues to use skills at home.     ASSESSMENT: Current Emotional / Mental Status (status of significant symptoms):   Risk status (Self / Other harm or suicidal ideation)   Client denies current fears or concerns for personal safety.   Client denies current or recent suicidal ideation or behaviors.   Client denies current or recent homicidal ideation or behaviors.   Client denies current or recent self  injurious behavior or ideation.   Client denies other safety concerns.   A safety and risk management plan has not been developed at this time, however client was given the after-hours number / 911 should there be a change in any of these risk factors.     Appearance:   Appropriate    Eye Contact:   Good    Psychomotor Behavior: Normal    Attitude:   Cooperative    Orientation:   All   Speech    Rate / Production: Normal     Volume:  Normal    Mood:    Normal   Affect:    Appropriate  Bright    Thought Content:  Clear    Thought Form:  Coherent  Circumstantial   Insight:    Fair         Medication Compliance:   Yes     Changes in Health Issues:   None reported     Chemical Use Review:   Substance Use: Chemical use reviewed, no active concerns identified      Tobacco Use: No current tobacco use.       Collateral Reports Completed:   Not Applicable    PLAN: (Client Tasks / Therapist Tasks / Other)  Etta was asked to work on these skills at home and to return for follow up appointments.    INDERJIT RowlandSW

## 2017-03-27 ASSESSMENT — ANXIETY QUESTIONNAIRES: GAD7 TOTAL SCORE: 3

## 2017-03-27 ASSESSMENT — PATIENT HEALTH QUESTIONNAIRE - PHQ9: SUM OF ALL RESPONSES TO PHQ QUESTIONS 1-9: 2

## 2017-03-29 NOTE — TELEPHONE ENCOUNTER
FORFIVO XL        Last Written Prescription Date: 12/27/16 (D/C 1/30/17, dose change)  Last Fill Quantity: 30; # refills: 2  Last Office Visit with [facility], [facility] or [facility] prescribing provider:  10/25/16   Next 5 appointments (look out 90 days)     Apr 25, 2017  8:30 AM CDT   (Arrive by 8:15 AM)   Return Visit with Lilly Hinkle [facility]   [facility]    [address] [place] [id number] [phone]                   Last PHQ-9 score on record=   PHQ-9 SCORE 3/21/2017   Total Score 2       No results found for: AST  No results found for: ALT

## 2017-03-31 RX ORDER — BUPROPION HYDROCHLORIDE 450 MG/1
TABLET, FILM COATED, EXTENDED RELEASE ORAL
Qty: 30 TABLET | Refills: 2 | Status: SHIPPED | OUTPATIENT
Start: 2017-03-31 | End: 2017-07-10

## 2017-03-31 NOTE — TELEPHONE ENCOUNTER
PCP Dr. Carrillo. Bupropion 150 MG and Bupropion 300 MG listed on current medication list. Please advise. Please see below.

## 2017-04-25 ENCOUNTER — OFFICE VISIT (OUTPATIENT)
Dept: PSYCHOLOGY | Facility: OTHER | Age: 37
End: 2017-04-25
Attending: SOCIAL WORKER
Payer: COMMERCIAL

## 2017-04-25 DIAGNOSIS — F43.23 ADJUSTMENT DISORDER WITH MIXED ANXIETY AND DEPRESSED MOOD: Primary | ICD-10-CM

## 2017-04-25 PROCEDURE — 90837 PSYTX W PT 60 MINUTES: CPT | Performed by: SOCIAL WORKER

## 2017-04-25 ASSESSMENT — ANXIETY QUESTIONNAIRES
IF YOU CHECKED OFF ANY PROBLEMS ON THIS QUESTIONNAIRE, HOW DIFFICULT HAVE THESE PROBLEMS MADE IT FOR YOU TO DO YOUR WORK, TAKE CARE OF THINGS AT HOME, OR GET ALONG WITH OTHER PEOPLE: NOT DIFFICULT AT ALL
7. FEELING AFRAID AS IF SOMETHING AWFUL MIGHT HAPPEN: NOT AT ALL
6. BECOMING EASILY ANNOYED OR IRRITABLE: SEVERAL DAYS
5. BEING SO RESTLESS THAT IT IS HARD TO SIT STILL: NOT AT ALL
1. FEELING NERVOUS, ANXIOUS, OR ON EDGE: SEVERAL DAYS
3. WORRYING TOO MUCH ABOUT DIFFERENT THINGS: SEVERAL DAYS
2. NOT BEING ABLE TO STOP OR CONTROL WORRYING: NOT AT ALL
GAD7 TOTAL SCORE: 3

## 2017-04-25 ASSESSMENT — PATIENT HEALTH QUESTIONNAIRE - PHQ9: 5. POOR APPETITE OR OVEREATING: NOT AT ALL

## 2017-04-25 NOTE — MR AVS SNAPSHOT
After Visit Summary   4/25/2017    Etta Obrien    MRN: [id number]           Patient Information     Date Of Birth          1980        Visit Information        Provider Department      4/25/2017 8:30 AM Lilly Hinkle LICSW [place] [facility]        Today's Diagnoses     Adjustment disorder with mixed anxiety and depressed mood    -  1       Follow-ups after your visit        Your next 10 appointments already scheduled     May 24, 2017  9:00 AM CDT   (Arrive by 8:45 AM)   [facility]   [place] [facility] ([facility])    [address] [place] [address] [phone]              Who to contact     If you have questions or need follow up information about today's clinic visit or your schedule please contact [facility] directly at [phone].  Normal or non-critical lab and imaging results will be communicated to you by MyChart, letter or phone within 4 business days after the clinic has received the results. If you do not hear from us within 7 days, please contact the clinic through MyChart or phone. If you have a critical or abnormal lab result, we will notify you by phone as soon as possible.  Submit refill requests through [employer] or call your pharmacy and they will forward the refill request to us. Please allow 3 business days for your refill to be completed.          Additional Information About Your Visit        MyChart Information     [employer] gives you secure access to your electronic health record. If you see a primary care provider, you can also send messages to your care team and make appointments. If you have questions, please call your primary care clinic.  If you do not have a primary care provider, please call [phone] and they will assist you.        Care EveryWhere ID     This is your Care EveryWhere ID. This could be used by other organizations to access your [facility]  records  [id number]         Blood Pressure from Last 3 Encounters:   01/25/17 118/76   10/25/16 120/64   09/27/16 108/74    Weight from Last 3 Encounters:   01/25/17 163 lb (73.9 kg)   10/25/16 165 lb (74.8 kg)   09/27/16 167 lb (75.8 kg)              Today, you had the following     No orders found for display       Primary Care Provider Office Phone # Fax #    Ashley Allen [phone]       [address] [address]        Thank you!     Thank you for choosing [facility]  for your care. Our goal is always to provide you with excellent care. Hearing back from our patients is one way we can continue to improve our services. Please take a few minutes to complete the written survey that you may receive in the mail after your visit with us. Thank you!             Your Updated Medication List - Protect others around you: Learn how to safely use, store and throw away your medicines at www.disposemymeds.org.          This list is accurate as of: 4/25/17 11:59 PM.  Always use your most recent med list.                   Brand Name Dispense Instructions for use    BLISOVI FE 1/20 1-20 MG-MCG per tablet   Generic drug:  norethindrone-ethinyl estradiol     84 tablet    TAKE THE ACTIVE TABLET DAILY FOR AMENORRHEA.       * buPROPion 300 MG 24 hr tablet    WELLBUTRIN XL    30 tablet    Take 1 tablet (300 mg) by mouth every morning       * buPROPion 150 MG 24 hr tablet    WELLBUTRIN XL    30 tablet    Take 1 tablet (150 mg) by mouth every morning Take with 300 mg capsules for total of 450 mg daily       * FORFIVO  MG Tb24   Generic drug:  BuPROPion HCl ER (XL)     30 tablet    TAKE 1 TAB BY MOUTH EVERY MORNING       levothyroxine 75 MCG tablet    SYNTHROID/LEVOTHROID    90 tablet    TAKE 1 TABLET BY MOUTH ONCE DAILY       * Notice:  This list has 3 medication(s) that are the same as other medications prescribed for you. Read the directions  carefully, and ask your doctor or other care provider to review them with you.

## 2017-05-01 NOTE — PROGRESS NOTES
Progress Note    Client Name: Etta Obrien  Date: April 25, 2017         Service Type: Individual      Session Start Time: 8:40  Session End Time: 9:34      Session Length: 54 minutes     Session #: 7     Attendees: Client attended alone    DSM V Dx:Adjustment Disorders  309.28 (F43.23) With mixed anxiety and depressed mood  DA Date:9/14/2016    Treatment Plan Due: 7/25/17  PHQ-9 :   PHQ-9 SCORE 2/6/2017 3/21/2017 4/25/2017   Total Score 0 2 2      ARNALDO-7 :    ARNALDO-7 SCORE 2/6/2017 3/21/2017 4/25/2017   Total Score 1 3 3           DATA      Progress Since Last Session (Related to Symptoms / Goals / Homework):   Symptoms: same    Homework: Achieved / completed to satisfaction     Current / Ongoing Stressors and Concerns:  Etta explained that she has been feeling increasingly overwhelmed with the changes she is making in life. However, she does report that even though it's overwhelming, she is feeling ok. Etta also discussed the grief and loss process that she is currently going through.     Treatment Objective(s) Addressed in This Session:   Objective A (Client Action)                   Client will learn and demonstrate CBT coping  skills      Intervention:   Provided psycho-education utilizing CBT-cognitive restructuring. Also discussed grief and loss coping skills.     Response to Interventions:   Etta stated she understood this information.  Etta states that she continues to use skills at home.     ASSESSMENT: Current Emotional / Mental Status (status of significant symptoms):   Risk status (Self / Other harm or suicidal ideation)   Client denies current fears or concerns for personal safety.   Client denies current or recent suicidal ideation or behaviors.   Client denies current or recent homicidal ideation or behaviors.   Client denies current or recent self injurious behavior or ideation.   Client denies other safety concerns.   A safety and risk management plan has  not been developed at this time, however client was given the after-hours number / 911 should there be a change in any of these risk factors.     Appearance:   Appropriate    Eye Contact:   Good    Psychomotor Behavior: Normal    Attitude:   Cooperative    Orientation:   All   Speech    Rate / Production: Normal     Volume:  Normal    Mood:    Normal   Affect:    Appropriate  tearful at times    Thought Content:  Clear    Thought Form:  Coherent  Circumstantial   Insight:    Fair         Medication Compliance:   Yes     Changes in Health Issues:   None reported     Chemical Use Review:   Substance Use: Chemical use reviewed, no active concerns identified      Tobacco Use: No current tobacco use.       Collateral Reports Completed:   Not Applicable    PLAN: (Client Tasks / Therapist Tasks / Other)  Etta was asked to work on these skills at home and to return for follow up appointments.    INDERJIT RowlandSW

## 2017-05-01 NOTE — PROGRESS NOTES
Treatment Plan     Client's Name: Etta Obrien  YOB: 1980     Date: April 25, 2016     DSM-V Diagnoses:  Adjustment Disorders  309.28 (F43.23) With mixed anxiety and depressed mood  DA Date: 9/14/2016    Psychosocial / Contextual Factors: Relationship difficulties      Referral / Collaboration:  Referral to another professional/service is not indicated at this time.     Services to be provided/Interventions:   Etta has ended her family sessions. This treatment plan will be used for individual sessions.    Interventions will include individual sessions with a focus on emotion regulation skills, increase coping skills, CBT, teach effective communication skills, and teach relaxation strategies. Crisis as needed.     Functional Impairment:   Client's symptoms are causing reduced functional status in the following areas: Activities of Daily Living - anxiety and irritability  Occupational / Vocational - difficulty with anxiety    Anticipated number of session: 3      MMeasurableTreatment Goal(s) related to diagnosis / functional impairment(s)  Goal 1: Client will report less anxiety and increased functioning.       Objective A (Client Action)                  Client will learn and demonstrate relaxation skills.      Objective B  Client will learn and demonstrate logical reasoning skills.      Objective C  Client will report using CBT and relaxation skills 5 days out of 7.      Goal 2: Client will report more effective coping      Objective A (Client Action)                  Client will learn and demonstrate CBT coping skills.      Objective B  Client will report using CBT coping skills 5 days out of 7.      INDERJIT RowlandSW

## 2017-05-02 ASSESSMENT — ANXIETY QUESTIONNAIRES: GAD7 TOTAL SCORE: 3

## 2017-05-02 ASSESSMENT — PATIENT HEALTH QUESTIONNAIRE - PHQ9: SUM OF ALL RESPONSES TO PHQ QUESTIONS 1-9: 2

## 2017-05-24 ENCOUNTER — OFFICE VISIT (OUTPATIENT)
Dept: PSYCHOLOGY | Facility: OTHER | Age: 37
End: 2017-05-24
Attending: SOCIAL WORKER
Payer: COMMERCIAL

## 2017-05-24 DIAGNOSIS — F43.23 ADJUSTMENT DISORDER WITH MIXED ANXIETY AND DEPRESSED MOOD: Primary | ICD-10-CM

## 2017-05-24 PROCEDURE — 90837 PSYTX W PT 60 MINUTES: CPT | Performed by: SOCIAL WORKER

## 2017-05-24 ASSESSMENT — ANXIETY QUESTIONNAIRES
IF YOU CHECKED OFF ANY PROBLEMS ON THIS QUESTIONNAIRE, HOW DIFFICULT HAVE THESE PROBLEMS MADE IT FOR YOU TO DO YOUR WORK, TAKE CARE OF THINGS AT HOME, OR GET ALONG WITH OTHER PEOPLE: NOT DIFFICULT AT ALL
GAD7 TOTAL SCORE: 5
2. NOT BEING ABLE TO STOP OR CONTROL WORRYING: SEVERAL DAYS
5. BEING SO RESTLESS THAT IT IS HARD TO SIT STILL: SEVERAL DAYS
6. BECOMING EASILY ANNOYED OR IRRITABLE: SEVERAL DAYS
7. FEELING AFRAID AS IF SOMETHING AWFUL MIGHT HAPPEN: NOT AT ALL
3. WORRYING TOO MUCH ABOUT DIFFERENT THINGS: NOT AT ALL
1. FEELING NERVOUS, ANXIOUS, OR ON EDGE: SEVERAL DAYS

## 2017-05-24 ASSESSMENT — PATIENT HEALTH QUESTIONNAIRE - PHQ9: 5. POOR APPETITE OR OVEREATING: SEVERAL DAYS

## 2017-05-24 NOTE — MR AVS SNAPSHOT
After Visit Summary   5/24/2017    Etta Obrien    MRN: [id number]           Patient Information     Date Of Birth          1980        Visit Information        Provider Department      5/24/2017 9:00 AM Lilly Hinkle LICSW [place] [facility]        Today's Diagnoses     Adjustment disorder with mixed anxiety and depressed mood    -  1       Follow-ups after your visit        Your next 10 appointments already scheduled     Jun 30, 2017  9:00 AM CDT   (Arrive by 8:45 AM)   Return Visit with Lilly Hinkle [facility] [place] [facility] ([facility]    [address] [place] [address] [phone]              Who to contact     If you have questions or need follow up information about today's clinic visit or your schedule please contact [facility] directly at [phone].  Normal or non-critical lab and imaging results will be communicated to you by MyChart, letter or phone within 4 business days after the clinic has received the results. If you do not hear from us within 7 days, please contact the clinic through MyChart or phone. If you have a critical or abnormal lab result, we will notify you by phone as soon as possible.  Submit refill requests through [employer] or call your pharmacy and they will forward the refill request to us. Please allow 3 business days for your refill to be completed.          Additional Information About Your Visit        MyChart Information     [employer] gives you secure access to your electronic health record. If you see a primary care provider, you can also send messages to your care team and make appointments. If you have questions, please call your primary care clinic.  If you do not have a primary care provider, please call [phone] and they will assist you.        Care EveryWhere ID     This is your Care EveryWhere ID. This could be used by other organizations to access your [facility]  records  [id number]         Blood Pressure from Last 3 Encounters:   01/25/17 118/76   10/25/16 120/64   09/27/16 108/74    Weight from Last 3 Encounters:   01/25/17 163 lb (73.9 kg)   10/25/16 165 lb (74.8 kg)   09/27/16 167 lb (75.8 kg)              Today, you had the following     No orders found for display       Primary Care Provider Office Phone # Fax #    Ashley Allen [phone]       [address] [address]        Thank you!     Thank you for choosing [facility]  for your care. Our goal is always to provide you with excellent care. Hearing back from our patients is one way we can continue to improve our services. Please take a few minutes to complete the written survey that you may receive in the mail after your visit with us. Thank you!             Your Updated Medication List - Protect others around you: Learn how to safely use, store and throw away your medicines at www.disposemymeds.org.          This list is accurate as of: 5/24/17 11:59 PM.  Always use your most recent med list.                   Brand Name Dispense Instructions for use    BLISOVI FE 1/20 1-20 MG-MCG per tablet   Generic drug:  norethindrone-ethinyl estradiol     84 tablet    TAKE THE ACTIVE TABLET DAILY FOR AMENORRHEA.       * buPROPion 300 MG 24 hr tablet    WELLBUTRIN XL    30 tablet    Take 1 tablet (300 mg) by mouth every morning       * buPROPion 150 MG 24 hr tablet    WELLBUTRIN XL    30 tablet    Take 1 tablet (150 mg) by mouth every morning Take with 300 mg capsules for total of 450 mg daily       * FORFIVO  MG Tb24   Generic drug:  BuPROPion HCl ER (XL)     30 tablet    TAKE 1 TAB BY MOUTH EVERY MORNING       levothyroxine 75 MCG tablet    SYNTHROID/LEVOTHROID    90 tablet    TAKE 1 TABLET BY MOUTH ONCE DAILY       * Notice:  This list has 3 medication(s) that are the same as other medications prescribed for you. Read the directions  carefully, and ask your doctor or other care provider to review them with you.

## 2017-05-31 NOTE — PROGRESS NOTES
Progress Note    Client Name: Etta Obrien  Date: May 24, 2017         Service Type: Individual      Session Start Time: 9:00  Session End Time: 9:56      Session Length: 56 minutes     Session #: 8     Attendees: Client attended alone    DSM V Dx:Adjustment Disorders  309.28 (F43.23) With mixed anxiety and depressed mood  DA Date:9/14/2016    Treatment Plan Due: 7/25/17  PHQ-9 :   PHQ-9 SCORE 3/21/2017 4/25/2017 5/24/2017   Total Score 2 2 4      ARNALDO-7 :    ARNALDO-7 SCORE 3/21/2017 4/25/2017 5/24/2017   Total Score 3 3 5           DATA      Progress Since Last Session (Related to Symptoms / Goals / Homework):   Symptoms: slight worsening, grief    Homework: Achieved / completed to satisfaction     Current / Ongoing Stressors and Concerns:  Etta was tearful during this session. She reported that her divorce is now final. Etta discussed grief and loss issues that have come up with this. She also discussed how these emotions have an impact on her life and maladaptive thought processes.      Treatment Objective(s) Addressed in This Session:   Objective A (Client Action)                   Client will learn and demonstrate CBT coping  skills      Intervention:   Provided psycho-education utilizing CBT-cognitive restructuring. Also discussed grief and loss coping skills.     Response to Interventions:   Etta stated she understood this information.  Etta states that she continues to use skills at home.     ASSESSMENT: Current Emotional / Mental Status (status of significant symptoms):   Risk status (Self / Other harm or suicidal ideation)   Client denies current fears or concerns for personal safety.   Client denies current or recent suicidal ideation or behaviors.   Client denies current or recent homicidal ideation or behaviors.   Client denies current or recent self injurious behavior or ideation.   Client denies other safety concerns.   A safety and risk management plan has not  been developed at this time, however client was given the after-hours number / 911 should there be a change in any of these risk factors.     Appearance:   Appropriate    Eye Contact:   Good    Psychomotor Behavior: Normal    Attitude:   Cooperative    Orientation:   All   Speech    Rate / Production: Normal     Volume:  Normal    Mood:    Normal   Affect:    Appropriate  tearful at times    Thought Content:  Clear    Thought Form:  Coherent  Circumstantial   Insight:    Fair         Medication Compliance:   Yes     Changes in Health Issues:   None reported     Chemical Use Review:   Substance Use: Chemical use reviewed, no active concerns identified      Tobacco Use: No current tobacco use.       Collateral Reports Completed:   Not Applicable    PLAN: (Client Tasks / Therapist Tasks / Other)  Etta was asked to work on these skills at home and to return for follow up appointments.    INDERJIT RowlandSW

## 2017-05-31 NOTE — PROGRESS NOTES
Treatment Plan    Client's Name: Etta Obrien  YOB: 1980    Date: May 24, 2017    DSM-V Diagnoses: Adjustment Disorders  309.28 (F43.23) With mixed anxiety and depressed mood  DA Date:9/14/2016  Psychosocial / Contextual Factors:   Relationship difficulties     Referral / Collaboration:  Referral to another professional/service is not indicated at this time.     Services to be provided/Interventions:   Interventions will include individual and family sessions with a focus on emotion regulation skills, increase coping skills, increase trust, teach effective communication skills, discus parenting skills and teach relaxation strategies.Crisis as needed.     Functional Impairment:   Client's symptoms are causing reduced functional status in the following areas: Activities of Daily Living - anxiety and irritability  Occupational / Vocational - difficulty with anxiety    Anticipated number of session: 6      MeasurableTreatment Goal(s) related to diagnosis / functional impairment(s)  Goal 1: Client will report a reduction in depressed mood 5 sessions out of 6.      Objective A (Client Action)                  Client will learn 5 CBT skills to use for reducing depressed mood.      Objective B  Client will report using at least 2 CBT skills 5 days out of 7.      Objective C  Client will learn and use mindfulness skills 5 days out of 7.      Goal 2: Client will report a decrease in anxiety 5 out of 6 sessions.      Objective A (Client Action)                  Client will learn 5 relaxation skills.      Objective B  Client will report using relaxations skills 5 days out of 7.      INDERJIT RowlandSW

## 2017-06-01 ASSESSMENT — ANXIETY QUESTIONNAIRES: GAD7 TOTAL SCORE: 5

## 2017-06-01 ASSESSMENT — PATIENT HEALTH QUESTIONNAIRE - PHQ9: SUM OF ALL RESPONSES TO PHQ QUESTIONS 1-9: 4

## 2017-06-30 ENCOUNTER — OFFICE VISIT (OUTPATIENT)
Dept: PSYCHOLOGY | Facility: OTHER | Age: 37
End: 2017-06-30
Attending: SOCIAL WORKER
Payer: COMMERCIAL

## 2017-06-30 DIAGNOSIS — F43.23 ADJUSTMENT DISORDER WITH MIXED ANXIETY AND DEPRESSED MOOD: Primary | ICD-10-CM

## 2017-06-30 PROCEDURE — 90837 PSYTX W PT 60 MINUTES: CPT | Performed by: SOCIAL WORKER

## 2017-06-30 RX ORDER — BUPROPION HYDROCHLORIDE 150 MG/1
150 TABLET ORAL EVERY MORNING
Qty: 30 TABLET | Refills: 0 | Status: SHIPPED | OUTPATIENT
Start: 2017-06-30 | End: 2017-07-11

## 2017-06-30 RX ORDER — BUPROPION HYDROCHLORIDE 300 MG/1
300 TABLET ORAL EVERY MORNING
Qty: 30 TABLET | Refills: 0 | Status: SHIPPED | OUTPATIENT
Start: 2017-06-30 | End: 2017-07-11

## 2017-06-30 ASSESSMENT — ANXIETY QUESTIONNAIRES
IF YOU CHECKED OFF ANY PROBLEMS ON THIS QUESTIONNAIRE, HOW DIFFICULT HAVE THESE PROBLEMS MADE IT FOR YOU TO DO YOUR WORK, TAKE CARE OF THINGS AT HOME, OR GET ALONG WITH OTHER PEOPLE: NOT DIFFICULT AT ALL
5. BEING SO RESTLESS THAT IT IS HARD TO SIT STILL: SEVERAL DAYS
3. WORRYING TOO MUCH ABOUT DIFFERENT THINGS: SEVERAL DAYS
2. NOT BEING ABLE TO STOP OR CONTROL WORRYING: SEVERAL DAYS
GAD7 TOTAL SCORE: 6
1. FEELING NERVOUS, ANXIOUS, OR ON EDGE: SEVERAL DAYS
6. BECOMING EASILY ANNOYED OR IRRITABLE: SEVERAL DAYS
7. FEELING AFRAID AS IF SOMETHING AWFUL MIGHT HAPPEN: NOT AT ALL

## 2017-06-30 ASSESSMENT — PATIENT HEALTH QUESTIONNAIRE - PHQ9: 5. POOR APPETITE OR OVEREATING: SEVERAL DAYS

## 2017-06-30 NOTE — MR AVS SNAPSHOT
After Visit Summary   6/30/2017    Etta Obrien    MRN: [id number]           Patient Information     Date Of Birth          1980        Visit Information        Provider Department      [facility]2017 [facility]        Today's Diagnoses     Adjustment disorder with mixed anxiety and depressed mood    -  1       Follow-ups after your visit        Your next 10 appointments already scheduled     Jul 28, 2017 11:00 AM CDT   (Arrive by 10:45 AM)   [facility]    [address] [place] [address] [phone]              Who to contact     If you have questions or need follow up information about today's clinic visit or your schedule please contact [facility] directly at [phone].  Normal or non-critical lab and imaging results will be communicated to you by MyChart, letter or phone within 4 business days after the clinic has received the results. If you do not hear from us within 7 days, please contact the clinic through "[employer]hart or phone. If you have a critical or abnormal lab result, we will notify you by phone as soon as possible.  Submit refill requests through [employer] or call your pharmacy and they will forward the refill request to us. Please allow 3 business days for your refill to be completed.          Additional Information About Your Visit        MyChart Information     [employer] gives you secure access to your electronic health record. If you see a primary care provider, you can also send messages to your care team and make appointments. If you have questions, please call your primary care clinic.  If you do not have a primary care provider, please call [phone] and they will assist you.        Care EveryWhere ID     This is your Care EveryWhere ID. This could be used by other organizations to access your [facility]  records  [id number]         Blood Pressure from Last 3 Encounters:   07/11/17 118/74   01/25/17 118/76   10/25/16 120/64    Weight from Last 3 Encounters:   07/11/17 178 lb (80.7 kg)   01/25/17 163 lb (73.9 kg)   10/25/16 165 lb (74.8 kg)              Today, you had the following     No orders found for display         Where to get your medicines      These medications were sent to [place] [address] [place] [address] [address]     Phone:  [phone]     buPROPion 150 MG 24 hr tablet    buPROPion 300 MG 24 hr tablet          Primary Care Provider Office Phone # Fax #    Ashley Allen [phone]       [facility] [address] [place] [address]        Equal Access to Services     [facility] : Hadii isabelle montague hadasho Sojerzy, waaxda luqadaha, qaybta kaalmada adeegyada, bernardo long . So [facility] [phone].    ATENCIÓN: Si habla español, tiene a morrow disposición servicios gratuitos de asistencia lingüística. Raji al [phone].    We comply with applicable federal civil rights laws and [place] laws. We do not discriminate on the basis of race, color, national origin, age, disability sex, sexual orientation or gender identity.            Thank you!     Thank you for choosing [facility]  for your care. Our goal is always to provide you with excellent care. Hearing back from our patients is one way we can continue to improve our services. Please take a few minutes to complete the written survey that you may receive in the mail after your visit with us. Thank you!             Your Updated Medication List - Protect others around you: Learn how to safely use, store and throw away your medicines at www.disposemymeds.org.          This list is accurate as of: 6/30/17 11:59 PM.  Always use your most recent med list.                   Brand Name Dispense Instructions for use Diagnosis     BLISOVI FE 1/20 1-20 MG-MCG per tablet   Generic drug:  norethindrone-ethinyl estradiol     84 tablet    TAKE THE ACTIVE TABLET DAILY FOR AMENORRHEA.    Family planning       * buPROPion 150 MG 24 hr tablet    WELLBUTRIN XL    30 tablet    Take 1 tablet (150 mg) by mouth every morning Take with 300 mg capsules for total of 450 mg daily    Postpartum depression       * buPROPion 300 MG 24 hr tablet    WELLBUTRIN XL    30 tablet    Take 1 tablet (300 mg) by mouth every morning    Postpartum depression       levothyroxine 75 MCG tablet    SYNTHROID/LEVOTHROID    90 tablet    TAKE 1 TABLET BY MOUTH ONCE DAILY    H/O premature delivery, Hypothyroidism, unspecified type       * Notice:  This list has 2 medication(s) that are the same as other medications prescribed for you. Read the directions carefully, and ask your doctor or other care provider to review them with you.

## 2017-07-05 ENCOUNTER — MYC MEDICAL ADVICE (OUTPATIENT)
Dept: FAMILY MEDICINE | Facility: OTHER | Age: 37
End: 2017-07-05

## 2017-07-10 RX ORDER — BUPROPION HYDROCHLORIDE 450 MG/1
450 TABLET, FILM COATED, EXTENDED RELEASE ORAL DAILY
Qty: 30 TABLET | Refills: 2 | Status: SHIPPED | OUTPATIENT
Start: 2017-07-10 | End: 2017-10-06

## 2017-07-11 ENCOUNTER — OFFICE VISIT (OUTPATIENT)
Dept: FAMILY MEDICINE | Facility: OTHER | Age: 37
End: 2017-07-11
Attending: FAMILY MEDICINE
Payer: COMMERCIAL

## 2017-07-11 VITALS
SYSTOLIC BLOOD PRESSURE: 118 MMHG | DIASTOLIC BLOOD PRESSURE: 74 MMHG | HEIGHT: 63 IN | BODY MASS INDEX: 31.54 KG/M2 | RESPIRATION RATE: 16 BRPM | HEART RATE: 93 BPM | TEMPERATURE: 98.4 F | WEIGHT: 178 LBS | OXYGEN SATURATION: 99 %

## 2017-07-11 DIAGNOSIS — Z71.89 ACP (ADVANCE CARE PLANNING): Chronic | ICD-10-CM

## 2017-07-11 DIAGNOSIS — F33.1 MAJOR DEPRESSIVE DISORDER, RECURRENT EPISODE, MODERATE (H): Primary | ICD-10-CM

## 2017-07-11 PROCEDURE — 99213 OFFICE O/P EST LOW 20 MIN: CPT | Performed by: FAMILY MEDICINE

## 2017-07-11 RX ORDER — ESCITALOPRAM OXALATE 10 MG/1
10 TABLET ORAL DAILY
Qty: 30 TABLET | Refills: 1 | Status: SHIPPED | OUTPATIENT
Start: 2017-07-11 | End: 2017-08-09

## 2017-07-11 ASSESSMENT — ANXIETY QUESTIONNAIRES
1. FEELING NERVOUS, ANXIOUS, OR ON EDGE: SEVERAL DAYS
6. BECOMING EASILY ANNOYED OR IRRITABLE: SEVERAL DAYS
IF YOU CHECKED OFF ANY PROBLEMS ON THIS QUESTIONNAIRE, HOW DIFFICULT HAVE THESE PROBLEMS MADE IT FOR YOU TO DO YOUR WORK, TAKE CARE OF THINGS AT HOME, OR GET ALONG WITH OTHER PEOPLE: NOT DIFFICULT AT ALL
2. NOT BEING ABLE TO STOP OR CONTROL WORRYING: SEVERAL DAYS
3. WORRYING TOO MUCH ABOUT DIFFERENT THINGS: SEVERAL DAYS
7. FEELING AFRAID AS IF SOMETHING AWFUL MIGHT HAPPEN: NOT AT ALL
5. BEING SO RESTLESS THAT IT IS HARD TO SIT STILL: SEVERAL DAYS
4. TROUBLE RELAXING: SEVERAL DAYS
GAD7 TOTAL SCORE: 6

## 2017-07-11 NOTE — MR AVS SNAPSHOT
After Visit Summary   7/11/2017    Etta Obrien    MRN: [id number]           Patient Information     Date Of Birth          1980        Visit Information        Provider Department      7/11/2017 9:30 AM [facility]        Today's Diagnoses     Major depressive disorder, recurrent episode, moderate (H)    -  1    ACP (advance care planning)           Follow-ups after your visit        Follow-up notes from your care team     Return in about 4 weeks (around 8/8/2017).      Your next 10 appointments already scheduled     Jul 28, 2017 11:00 AM CDT   (Arrive by 10:45 AM)   [facility]    [address] [place] [address] [phone]              Who to contact     If you have questions or need follow up information about today's clinic visit or your schedule please contact [facility] directly at [phone].  Normal or non-critical lab and imaging results will be communicated to you by [employer], letter or phone within 4 business days after the clinic has received the results. If you do not hear from us within 7 days, please contact the clinic through [employer] or phone. If you have a critical or abnormal lab result, we will notify you by phone as soon as possible.  Submit refill requests through [employer] or call your pharmacy and they will forward the refill request to us. Please allow 3 business days for your refill to be completed.          Additional Information About Your Visit        MyChart Information     [employer] gives you secure access to your electronic health record. If you see a primary care provider, you can also send messages to your care team and make appointments. If you have questions, please call your primary care clinic.  If you do not have a primary care provider, please call [phone] and they will assist you.        Care  "EveryWhere ID     This is your Care EveryWhere ID. This could be used by other organizations to access your [place] medical records  [id number]        Your Vitals Were     Pulse Temperature Respirations Height Pulse Oximetry BMI (Body Mass Index)    93 98.4  F (36.9  C) (Tympanic) 16 5' 3\" (1.6 m) 99% 31.53 kg/m2       Blood Pressure from Last 3 Encounters:   07/11/17 118/74   01/25/17 118/76   10/25/16 120/64    Weight from Last 3 Encounters:   07/11/17 178 lb (80.7 kg)   01/25/17 163 lb (73.9 kg)   10/25/16 165 lb (74.8 kg)              Today, you had the following     No orders found for display         Today's Medication Changes          These changes are accurate as of: 7/11/17 11:59 PM.  If you have any questions, ask your nurse or doctor.               Start taking these medicines.        Dose/Directions    escitalopram 10 MG tablet   Commonly known as:  LEXAPRO   Used for:  Major depressive disorder, recurrent episode, moderate (H)   Started by:  Ashley Allen MD        Dose:  10 mg   Take 1 tablet (10 mg) by mouth daily   Quantity:  30 tablet   Refills:  1         These medicines have changed or have updated prescriptions.        Dose/Directions    BuPROPion HCl ER (XL) 450 MG Tb24   Commonly known as:  FORFIVO XL   This may have changed:  Another medication with the same name was removed. Continue taking this medication, and follow the directions you see here.   Used for:  Postpartum depression   Changed by:  Ashley Allen MD        Dose:  450 mg   Take 450 mg by mouth daily   Quantity:  30 tablet   Refills:  2            Where to get your medicines      These medications were sent to [place] [address] [place] [address] [address]     Phone:  [phone]     escitalopram 10 MG tablet                Primary Care Provider Office Phone # Fax #    Ashley Allen [phone]       [address]  [facility] [address]        Equal Access to Services     [facility]: Hadii aad ku hadcarymissael Benali, wakirada luqadaha, qaybta marelykeikobernardo jones. So [facility] [phone].    ATENCIÓN: Si habla español, tiene a morrow disposición servicios gratuitos de asistencia lingüística. Raij al [phone].    We comply with applicable federal civil rights laws and [place] laws. We do not discriminate on the basis of race, color, national origin, age, disability sex, sexual orientation or gender identity.            Thank you!     Thank you for choosing [facility]  for your care. Our goal is always to provide you with excellent care. Hearing back from our patients is one way we can continue to improve our services. Please take a few minutes to complete the written survey that you may receive in the mail after your visit with us. Thank you!             Your Updated Medication List - Protect others around you: Learn how to safely use, store and throw away your medicines at www.disposemymeds.org.          This list is accurate as of: 7/11/17 11:59 PM.  Always use your most recent med list.                   Brand Name Dispense Instructions for use Diagnosis    BLISOVI FE 1/20 1-20 MG-MCG per tablet   Generic drug:  norethindrone-ethinyl estradiol     84 tablet    TAKE THE ACTIVE TABLET DAILY FOR AMENORRHEA.    Family planning       BuPROPion HCl ER (XL) 450 MG Tb24    FORFIVO XL    30 tablet    Take 450 mg by mouth daily    Postpartum depression       escitalopram 10 MG tablet    LEXAPRO    30 tablet    Take 1 tablet (10 mg) by mouth daily    Major depressive disorder, recurrent episode, moderate (H)       levothyroxine 75 MCG tablet    SYNTHROID/LEVOTHROID    90 tablet    TAKE 1 TABLET BY MOUTH ONCE DAILY    H/O premature delivery, Hypothyroidism, unspecified type

## 2017-07-11 NOTE — NURSING NOTE
"Chief Complaint   Patient presents with     Depression     update phq and deniz  having concerns about dosage       Initial /74 (BP Location: Right arm, Patient Position: Sitting, Cuff Size: Adult Regular)  Pulse 93  Temp 98.4  F (36.9  C) (Tympanic)  Resp 16  Ht 5' 3\" (1.6 m)  Wt 178 lb (80.7 kg)  SpO2 99%  BMI 31.53 kg/m2 Estimated body mass index is 31.53 kg/(m^2) as calculated from the following:    Height as of this encounter: 5' 3\" (1.6 m).    Weight as of this encounter: 178 lb (80.7 kg).  Medication Reconciliation: complete     Roxann Shen        "

## 2017-07-12 ASSESSMENT — PATIENT HEALTH QUESTIONNAIRE - PHQ9: SUM OF ALL RESPONSES TO PHQ QUESTIONS 1-9: 5

## 2017-07-12 ASSESSMENT — ANXIETY QUESTIONNAIRES: GAD7 TOTAL SCORE: 6

## 2017-07-14 NOTE — PROGRESS NOTES
SUBJECTIVE:                                                    Etta Obrien is a 36 year old female who presents to clinic today for the following health issues:    Depression and Anxiety Follow-Up    Status since last visit: Worsened over the past month or so    Other associated symptoms:None    Complicating factors:     Significant life event: Yes-  Divorce finalized, ex- is in a new relationship    Current substance abuse: None    PHQ-9 SCORE 2017   Total Score 2 4 5     ARNALDO-7 SCORE 2017   Total Score 3 5 6       PHQ-9  English  PHQ-9   Any Language  GAD7    Patient states Wellbutrin alone is not working anymore.  She is having a lot of crying spells, and it is interfering with work.        Problem list and histories reviewed & adjusted, as indicated.  Additional history: as documented    Patient Active Problem List   Diagnosis     PCOS (polycystic ovarian syndrome)     Constipation, chronic     Hirsutism     Elevated testosterone level in female     Ovarian hyperthecosis     History of pre-term labor     Hypothyroidism     Infertility, anovulation     PVC's (premature ventricular contractions)     H/O premature delivery     Bilateral carpal tunnel syndrome      (spontaneous vaginal delivery)     Well female exam with routine gynecological exam     ACP (advance care planning)     Past Surgical History:   Procedure Laterality Date     dental surgical procedure       LAPAROSCOPIC TUBAL DYE STUDY N/A 9/3/2014    Procedure: LAPAROSCOPIC TUBAL DYE STUDY;  Surgeon: Amanuel Purcell MD;  Location: [facility] OR       Social History   Substance Use Topics     Smoking status: Never Smoker     Smokeless tobacco: Never Used     Alcohol use No     Family History   Problem Relation Age of Onset     Lipids Father      hyperlipidemia     Hypertension Father      Lipids Mother      hyperlipidemia     Lupus Mother      erythematosus     DIABETES Other      Thyroid Disease  "Other      Asthma No family hx of          Current Outpatient Prescriptions   Medication Sig Dispense Refill     escitalopram (LEXAPRO) 10 MG tablet Take 1 tablet (10 mg) by mouth daily 30 tablet 1     BuPROPion HCl ER, XL, (FORFIVO XL) 450 MG TB24 Take 450 mg by mouth daily 30 tablet 2     BLISOVI FE 1/20 1-20 MG-MCG per tablet TAKE THE ACTIVE TABLET DAILY FOR AMENORRHEA. 84 tablet 5     levothyroxine (SYNTHROID/LEVOTHROID) 75 MCG tablet TAKE 1 TABLET BY MOUTH ONCE DAILY 90 tablet 3     Allergies   Allergen Reactions     Macrolides Hives     Macrolide antibiotics     Penicillins Hives     Sulfonamide Derivatives Hives     Sulfa       Reviewed and updated as needed this visit by clinical staff  Tobacco  Allergies  Meds  Problems  Med Hx  Surg Hx  Fam Hx  Soc Hx        Reviewed and updated as needed this visit by Provider         ROS:  Constitutional, HEENT, cardiovascular, pulmonary, gi and gu systems are negative, except as otherwise noted.    OBJECTIVE:     /74 (BP Location: Right arm, Patient Position: Sitting, Cuff Size: Adult Regular)  Pulse 93  Temp 98.4  F (36.9  C) (Tympanic)  Resp 16  Ht 5' 3\" (1.6 m)  Wt 178 lb (80.7 kg)  SpO2 99%  BMI 31.53 kg/m2  Body mass index is 31.53 kg/(m^2).  GENERAL: healthy, alert and no distress  PSYCH: mentation appears normal, tearful and anxious    Diagnostic Test Results:  none     ASSESSMENT/PLAN:     1. Major depressive disorder, recurrent episode, moderate (H)  Will augment current therapy with low dose Lexapro.  Continue to work with therapist.  Follow-up in 3-4 weeks, sooner as needed.  - escitalopram (LEXAPRO) 10 MG tablet; Take 1 tablet (10 mg) by mouth daily  Dispense: 30 tablet; Refill: 1    2. ACP (advance care planning)      FUTURE APPOINTMENTS:       - Follow-up visit in 3-4 weeks.      Ashley Allen MD  [facility]    "

## 2017-07-20 NOTE — PROGRESS NOTES
Progress Note    Client Name: Etta Obrien  Date: June 30, 2017         Service Type: Individual      Session Start Time: 9:00  Session End Time: 9:55      Session Length: 55 minutes     Session #: 9     Attendees: Client attended alone    DSM V Dx:Adjustment Disorders  309.28 (F43.23) With mixed anxiety and depressed mood  DA Date:9/14/2016    Treatment Plan Due: 7/25/17  PHQ-9 :   PHQ-9 SCORE 5/24/2017 6/30/2017 7/11/2017   Total Score 4 4 5      ARNALDO-7 :    ARNALDO-7 SCORE 5/24/2017 6/30/2017 7/11/2017   Total Score 5 6 6           DATA      Progress Since Last Session (Related to Symptoms / Goals / Homework):   Symptoms: same    Homework: Achieved / completed to satisfaction     Current / Ongoing Stressors and Concerns:  Etta explained that she is questioning many of the things that have happened in the last 6 months. She reports indecisiveness in interpersonal relationships and explains how this has a negative impact on her symptoms.     Treatment Objective(s) Addressed in This Session:   Objective A (Client Action)                   Client will learn and demonstrate CBT coping  skills      Intervention:   Provided psycho-education utilizing CBT-cognitive restructuring. Continued to discuss grief and loss issues.     Response to Interventions:   Etta stated she understood this information.  Etta states that she continues to use skills at home.     ASSESSMENT: Current Emotional / Mental Status (status of significant symptoms):   Risk status (Self / Other harm or suicidal ideation)   Client denies current fears or concerns for personal safety.   Client denies current or recent suicidal ideation or behaviors.   Client denies current or recent homicidal ideation or behaviors.   Client denies current or recent self injurious behavior or ideation.   Client denies other safety concerns.   A safety and risk management plan has not been developed at this time, however client was  given the after-hours number / 911 should there be a change in any of these risk factors.     Appearance:   Appropriate    Eye Contact:   Good    Psychomotor Behavior: Normal    Attitude:   Cooperative    Orientation:   All   Speech    Rate / Production: Normal     Volume:  Normal    Mood:    Normal   Affect:    Appropriate  tearful at times    Thought Content:  Clear    Thought Form:  Coherent  Circumstantial   Insight:    Fair         Medication Compliance:   Yes     Changes in Health Issues:   None reported     Chemical Use Review:   Substance Use: Chemical use reviewed, no active concerns identified      Tobacco Use: No current tobacco use.       Collateral Reports Completed:   Not Applicable    PLAN: (Client Tasks / Therapist Tasks / Other)  Etta was asked to work on these skills at home and to return for follow up appointments.    INDERJIT RowlandSW

## 2017-07-21 ASSESSMENT — ANXIETY QUESTIONNAIRES: GAD7 TOTAL SCORE: 6

## 2017-07-21 ASSESSMENT — PATIENT HEALTH QUESTIONNAIRE - PHQ9: SUM OF ALL RESPONSES TO PHQ QUESTIONS 1-9: 4

## 2017-07-28 ENCOUNTER — OFFICE VISIT (OUTPATIENT)
Dept: PSYCHOLOGY | Facility: OTHER | Age: 37
End: 2017-07-28
Attending: SOCIAL WORKER
Payer: COMMERCIAL

## 2017-07-28 DIAGNOSIS — F43.23 ADJUSTMENT DISORDER WITH MIXED ANXIETY AND DEPRESSED MOOD: Primary | ICD-10-CM

## 2017-07-28 PROCEDURE — 90837 PSYTX W PT 60 MINUTES: CPT | Performed by: SOCIAL WORKER

## 2017-07-28 NOTE — MR AVS SNAPSHOT
After Visit Summary   7/28/2017    Etta Obrien    MRN: [id number]           Patient Information     Date Of Birth          1980        Visit Information        Provider Department      7/28/2017 11:00 AM [facility]        Today's Diagnoses     Adjustment disorder with mixed anxiety and depressed mood    -  1       Follow-ups after your visit        Your next 10 appointments already scheduled     Aug 09, 2017 10:15 AM CDT   (Arrive by 10:00 AM)   SHORT with Ashley Allen MD   [facility] ([facility] )    [address] [address]   [phone]            Sep 06, 2017  8:00 AM CDT   (Arrive by 7:45 AM)   [facility] ([facility] )    [address] [place] [address] [phone]              Who to contact     If you have questions or need follow up information about today's clinic visit or your schedule please contact [facility] directly at [phone].  Normal or non-critical lab and imaging results will be communicated to you by MyChart, letter or phone within 4 business days after the clinic has received the results. If you do not hear from us within 7 days, please contact the clinic through C[employer] or phone. If you have a critical or abnormal lab result, we will notify you by phone as soon as possible.  Submit refill requests through BCKST[employer] or call your pharmacy and they will forward the refill request to us. Please allow 3 business days for your refill to be completed.          Additional Information About Your Visit        MyChart Information     BCKSTGR gives you secure access to your electronic health record. If you see a primary care provider, you can also send messages to your care team and make appointments. If you have questions, please call your primary care clinic.  If you  do not have a primary care provider, please call [phone] and they will assist you.        Care EveryWhere ID     This is your Care EveryWhere ID. This could be used by other organizations to access your [place] medical records  [id number]         Blood Pressure from Last 3 Encounters:   07/11/17 118/74   01/25/17 118/76   10/25/16 120/64    Weight from Last 3 Encounters:   07/11/17 178 lb (80.7 kg)   01/25/17 163 lb (73.9 kg)   10/25/16 165 lb (74.8 kg)              Today, you had the following     No orders found for display       Primary Care Provider Office Phone # Fax #    Ashley Allen [phone]       [address] [address]        Equal Access to Services     MARILU WHITEHEAD : Hadii isabelle ewing Sojerzy, waaxda luqadaha, qaybta kaalmada janki, bernardo long . So [facility] [phone].    ATENCIÓN: Si habla español, tiene a morrow disposición servicios gratuitos de asistencia lingüística. Raji al [phone].    We comply with applicable federal civil rights laws and [place] laws. We do not discriminate on the basis of race, color, national origin, age, disability sex, sexual orientation or gender identity.            Thank you!     Thank you for choosing [facility]  for your care. Our goal is always to provide you with excellent care. Hearing back from our patients is one way we can continue to improve our services. Please take a few minutes to complete the written survey that you may receive in the mail after your visit with us. Thank you!             Your Updated Medication List - Protect others around you: Learn how to safely use, store and throw away your medicines at www.disposemymeds.org.          This list is accurate as of: 7/28/17 11:59 PM.  Always use your most recent med list.                   Brand Name Dispense Instructions for use Diagnosis    BLISOVI FE 1/20 1-20 MG-MCG per  tablet   Generic drug:  norethindrone-ethinyl estradiol     84 tablet    TAKE THE ACTIVE TABLET DAILY FOR AMENORRHEA.    Family planning       BuPROPion HCl ER (XL) 450 MG Tb24    FORFIVO XL    30 tablet    Take 450 mg by mouth daily    Postpartum depression       escitalopram 10 MG tablet    LEXAPRO    30 tablet    Take 1 tablet (10 mg) by mouth daily    Major depressive disorder, recurrent episode, moderate (H)       levothyroxine 75 MCG tablet    SYNTHROID/LEVOTHROID    90 tablet    TAKE 1 TABLET BY MOUTH ONCE DAILY    H/O premature delivery, Hypothyroidism, unspecified type

## 2017-08-03 NOTE — PROGRESS NOTES
Progress Note    Client Name: Etta Obrien  Date: July 28, 2017         Service Type: Individual      Session Start Time: 10:45  Session End Time: 11:40      Session Length: 55 minutes     Session #: 10     Attendees: Client attended alone    DSM V Dx:Adjustment Disorders  309.28 (F43.23) With mixed anxiety and depressed mood  DA Date:9/14/2016    Treatment Plan Due: next visit  PHQ-9 :   PHQ-9 SCORE 5/24/2017 6/30/2017 7/11/2017   Total Score 4 4 5      ARNALDO-7 :    ARNALDO-7 SCORE 5/24/2017 6/30/2017 7/11/2017   Total Score 5 6 6           DATA      Progress Since Last Session (Related to Symptoms / Goals / Homework):   Symptoms: same    Homework: Achieved / completed to satisfaction     Current / Ongoing Stressors and Concerns:  Etta reports that she had a medication change and she feels this has been very helpful with her moods. She continues to discuss interpersonal turmoil and how this creates maladaptive thinking processes.     Treatment Objective(s) Addressed in This Session:   Objective A (Client Action)                   Client will learn and demonstrate CBT coping  skills      Intervention:   Provided psycho-education utilizing CBT-cognitive restructuring. Worked with Etta to show her how these skills can work in conjunction with daily activities.     Response to Interventions:   Etta stated she understood this information.     ASSESSMENT: Current Emotional / Mental Status (status of significant symptoms):   Risk status (Self / Other harm or suicidal ideation)   Client denies current fears or concerns for personal safety.   Client denies current or recent suicidal ideation or behaviors.   Client denies current or recent homicidal ideation or behaviors.   Client denies current or recent self injurious behavior or ideation.   Client denies other safety concerns.   A safety and risk management plan has not been developed at this time, however client was given the  after-hours number / 911 should there be a change in any of these risk factors.     Appearance:   Appropriate    Eye Contact:   Good    Psychomotor Behavior: Normal    Attitude:   Cooperative    Orientation:   All   Speech    Rate / Production: Normal     Volume:  Normal    Mood:    Normal   Affect:    Appropriate    Thought Content:  Clear    Thought Form:  Coherent  Circumstantial   Insight:    Fair         Medication Compliance:   Yes     Changes in Health Issues:   None reported     Chemical Use Review:   Substance Use: Chemical use reviewed, no active concerns identified      Tobacco Use: No current tobacco use.       Collateral Reports Completed:   Not Applicable    PLAN: (Client Tasks / Therapist Tasks / Other)  Etta was asked to work on these skills at home and to return for follow up appointments.    INDERJIT RowlandSW

## 2017-08-07 NOTE — TELEPHONE ENCOUNTER
: buPROPion (WELLBUTRIN XL) 300 MG 24   Last Written Prescription Date: 7-  Last Fill Quantity: 30; # refills: 2  Last Office Visit with [facility], [facility] or [facility] prescribing provider:  7-   Next 5 appointments (look out 90 days)     Aug 09, 2017 10:15 AM CDT   (Arrive by 10:00 AM)   SHORT with Ashley Allen MD   [facility] ([facility] )    [address] [facility] [address]   [phone]            Sep 06, 2017  8:00 AM CDT   (Arrive by 7:45 AM)   [facility] Visit with Lilly Hinkle [facility] )    [address] [place] [address] [phone]                   Last PHQ-9 score on record=   PHQ-9 SCORE 7/11/2017   Total Score 5       No results found for: AST  No results found for: ALT

## 2017-08-09 ENCOUNTER — OFFICE VISIT (OUTPATIENT)
Dept: FAMILY MEDICINE | Facility: OTHER | Age: 37
End: 2017-08-09
Attending: FAMILY MEDICINE
Payer: COMMERCIAL

## 2017-08-09 VITALS
SYSTOLIC BLOOD PRESSURE: 112 MMHG | HEIGHT: 63 IN | BODY MASS INDEX: 31.01 KG/M2 | DIASTOLIC BLOOD PRESSURE: 70 MMHG | HEART RATE: 87 BPM | OXYGEN SATURATION: 98 % | RESPIRATION RATE: 16 BRPM | WEIGHT: 175 LBS

## 2017-08-09 DIAGNOSIS — F33.1 MAJOR DEPRESSIVE DISORDER, RECURRENT EPISODE, MODERATE (H): Primary | ICD-10-CM

## 2017-08-09 PROCEDURE — 99213 OFFICE O/P EST LOW 20 MIN: CPT | Performed by: FAMILY MEDICINE

## 2017-08-09 RX ORDER — BUPROPION HYDROCHLORIDE 300 MG/1
TABLET ORAL
Qty: 30 TABLET | Refills: 4 | Status: SHIPPED | OUTPATIENT
Start: 2017-08-09 | End: 2017-08-09

## 2017-08-09 ASSESSMENT — ANXIETY QUESTIONNAIRES
2. NOT BEING ABLE TO STOP OR CONTROL WORRYING: SEVERAL DAYS
7. FEELING AFRAID AS IF SOMETHING AWFUL MIGHT HAPPEN: NOT AT ALL
3. WORRYING TOO MUCH ABOUT DIFFERENT THINGS: SEVERAL DAYS
6. BECOMING EASILY ANNOYED OR IRRITABLE: SEVERAL DAYS
4. TROUBLE RELAXING: SEVERAL DAYS
1. FEELING NERVOUS, ANXIOUS, OR ON EDGE: SEVERAL DAYS
IF YOU CHECKED OFF ANY PROBLEMS ON THIS QUESTIONNAIRE, HOW DIFFICULT HAVE THESE PROBLEMS MADE IT FOR YOU TO DO YOUR WORK, TAKE CARE OF THINGS AT HOME, OR GET ALONG WITH OTHER PEOPLE: NOT DIFFICULT AT ALL
5. BEING SO RESTLESS THAT IT IS HARD TO SIT STILL: MORE THAN HALF THE DAYS
GAD7 TOTAL SCORE: 7

## 2017-08-09 ASSESSMENT — PATIENT HEALTH QUESTIONNAIRE - PHQ9: SUM OF ALL RESPONSES TO PHQ QUESTIONS 1-9: 7

## 2017-08-09 NOTE — NURSING NOTE
"Chief Complaint   Patient presents with     Depression     doing better on medicaiton change       Initial /70 (BP Location: Left arm, Patient Position: Sitting, Cuff Size: Adult Regular)  Pulse 87  Resp 16  Ht 5' 3\" (1.6 m)  Wt 175 lb (79.4 kg)  SpO2 98%  BMI 31 kg/m2 Estimated body mass index is 31 kg/(m^2) as calculated from the following:    Height as of this encounter: 5' 3\" (1.6 m).    Weight as of this encounter: 175 lb (79.4 kg).  Medication Reconciliation: complete     Roxann Shen      "

## 2017-08-09 NOTE — MR AVS SNAPSHOT
After Visit Summary   8/9/2017    Etta Obrien    MRN: [id number]           Patient Information     Date Of Birth          1980        Visit Information        Provider Department      8/9/2017 10:15 AM Ashley Allen MD [facility]        Today's Diagnoses     Major depressive disorder, recurrent episode, moderate (H)    -  1       Follow-ups after your visit        Follow-up notes from your care team     Return in about 4 weeks (around 9/6/2017).      Your next 10 appointments already scheduled     Sep 06, 2017  8:00 AM CDT   (Arrive by 7:45 AM)   [facility]    [address]  [place] [address] [phone]              Who to contact     If you have questions or need follow up information about today's clinic visit or your schedule please contact [facility] directly at [phone].  Normal or non-critical lab and imaging results will be communicated to you by Act[employer]hart, letter or phone within 4 business days after the clinic has received the results. If you do not hear from us within 7 days, please contact the clinic through [employer] or phone. If you have a critical or abnormal lab result, we will notify you by phone as soon as possible.  Submit refill requests through [employer] or call your pharmacy and they will forward the refill request to us. Please allow 3 business days for your refill to be completed.          Additional Information About Your Visit        MyChart Information     [employer] gives you secure access to your electronic health record. If you see a primary care provider, you can also send messages to your care team and make appointments. If you have questions, please call your primary care clinic.  If you do not have a primary care provider, please call [phone] and they will assist you.        Care EveryWhere ID     This is your Care  "EveryWhere ID. This could be used by other organizations to access your [place] medical records  [id number]        Your Vitals Were     Pulse Respirations Height Pulse Oximetry BMI (Body Mass Index)       87 16 5' 3\" (1.6 m) 98% 31 kg/m2        Blood Pressure from Last 3 Encounters:   08/09/17 112/70   07/11/17 118/74   01/25/17 118/76    Weight from Last 3 Encounters:   08/09/17 175 lb (79.4 kg)   07/11/17 178 lb (80.7 kg)   01/25/17 163 lb (73.9 kg)              Today, you had the following     No orders found for display         Today's Medication Changes          These changes are accurate as of: 8/9/17 10:42 AM.  If you have any questions, ask your nurse or doctor.               Start taking these medicines.        Dose/Directions    sertraline 50 MG tablet   Commonly known as:  ZOLOFT   Used for:  Major depressive disorder, recurrent episode, moderate (H)   Started by:  Ashley Allen MD        Dose:  50 mg   Take 1 tablet (50 mg) by mouth daily   Quantity:  30 tablet   Refills:  1         These medicines have changed or have updated prescriptions.        Dose/Directions    BuPROPion HCl ER (XL) 450 MG Tb24   Commonly known as:  FORFIVO XL   This may have changed:  Another medication with the same name was removed. Continue taking this medication, and follow the directions you see here.   Used for:  Postpartum depression   Changed by:  Ashley Allen MD        Dose:  450 mg   Take 450 mg by mouth daily   Quantity:  30 tablet   Refills:  2         Stop taking these medicines if you haven't already. Please contact your care team if you have questions.     escitalopram 10 MG tablet   Commonly known as:  LEXAPRO   Stopped by:  Ashley Allen MD                Where to get your medicines      These medications were sent to [place] [address] [place] [address] [address]     Phone:  [phone]     sertraline 50 MG tablet                Primary " Care Provider Office Phone # Fax #    Ashley Allen [phone] [address] [facility] [address]        Equal Access to Services     MAGDAJUANIS CHARLEY : Hadii aad ku hadcarymissael Linn, wakirada saadreeceha, qaybta kakeikoda mercyadamaris, bernardo emilyin hayaamery madrdikathrin edithblaineelisabeth long. So [facility] [phone].    ATENCIÓN: Si habla español, tiene a morrow disposición servicios gratuitos de asistencia lingüística. Llame al [phone].    We comply with applicable federal civil rights laws and [place] laws. We do not discriminate on the basis of race, color, national origin, age, disability sex, sexual orientation or gender identity.            Thank you!     Thank you for choosing [facility]  for your care. Our goal is always to provide you with excellent care. Hearing back from our patients is one way we can continue to improve our services. Please take a few minutes to complete the written survey that you may receive in the mail after your visit with us. Thank you!             Your Updated Medication List - Protect others around you: Learn how to safely use, store and throw away your medicines at www.disposemymeds.org.          This list is accurate as of: 8/9/17 10:42 AM.  Always use your most recent med list.                   Brand Name Dispense Instructions for use Diagnosis    BLISOVI FE 1/20 1-20 MG-MCG per tablet   Generic drug:  norethindrone-ethinyl estradiol     84 tablet    TAKE THE ACTIVE TABLET DAILY FOR AMENORRHEA.    Family planning       BuPROPion HCl ER (XL) 450 MG Tb24    FORFIVO XL    30 tablet    Take 450 mg by mouth daily    Postpartum depression       levothyroxine 75 MCG tablet    SYNTHROID/LEVOTHROID    90 tablet    TAKE 1 TABLET BY MOUTH ONCE DAILY    H/O premature delivery, Hypothyroidism, unspecified type       sertraline 50 MG tablet    ZOLOFT    30 tablet    Take 1 tablet (50 mg) by mouth daily    Major depressive disorder, recurrent episode, moderate  (H)

## 2017-08-09 NOTE — PROGRESS NOTES
SUBJECTIVE:                                                    Etta Obrien is a 36 year old female who presents to clinic today for the following health issues:    Depression and Anxiety Follow-Up    Status since last visit: Improved in some aspects (less crying) but more fatigued    Other associated symptoms:increased fatigue since starting Lexapro    Complicating factors:     Significant life event: Yes-  Still dealing with aftermath of divorce     Current substance abuse: None    PHQ-9 SCORE 2017   Total Score 4 5 7     ARNALDO-7 SCORE 2017   Total Score 6 6 7       PHQ-9  English  PHQ-9   Any Language  GAD7        Problem list and histories reviewed & adjusted, as indicated.  Additional history: as documented    Patient Active Problem List   Diagnosis     PCOS (polycystic ovarian syndrome)     Constipation, chronic     Hirsutism     Elevated testosterone level in female     Ovarian hyperthecosis     History of pre-term labor     Hypothyroidism     Infertility, anovulation     PVC's (premature ventricular contractions)     H/O premature delivery     Bilateral carpal tunnel syndrome      (spontaneous vaginal delivery)     Well female exam with routine gynecological exam     ACP (advance care planning)     Major depressive disorder, recurrent episode, moderate (H)     Past Surgical History:   Procedure Laterality Date     dental surgical procedure       LAPAROSCOPIC TUBAL DYE STUDY N/A 9/3/2014    Procedure: LAPAROSCOPIC TUBAL DYE STUDY;  Surgeon: Amanuel Purcell MD;  Location: [facility] OR       Social History   Substance Use Topics     Smoking status: Never Smoker     Smokeless tobacco: Never Used     Alcohol use No     Family History   Problem Relation Age of Onset     Lipids Father      hyperlipidemia     Hypertension Father      Lipids Mother      hyperlipidemia     Lupus Mother      erythematosus     DIABETES Other      Thyroid Disease Other      Asthma No family hx  "of          Current Outpatient Prescriptions   Medication Sig Dispense Refill     sertraline (ZOLOFT) 50 MG tablet Take 1 tablet (50 mg) by mouth daily 30 tablet 1     BuPROPion HCl ER, XL, (FORFIVO XL) 450 MG TB24 Take 450 mg by mouth daily 30 tablet 2     BLISOVI FE 1/20 1-20 MG-MCG per tablet TAKE THE ACTIVE TABLET DAILY FOR AMENORRHEA. 84 tablet 5     levothyroxine (SYNTHROID/LEVOTHROID) 75 MCG tablet TAKE 1 TABLET BY MOUTH ONCE DAILY 90 tablet 3     [DISCONTINUED] buPROPion (WELLBUTRIN XL) 300 MG 24 hr tablet TAKE 1 TABLET BY MOUTH ONCE DAILY EVERY MORNING. TAKE WITH 15O MG TAB FOR TOTAL DAILY DOSE  30 tablet 4     Allergies   Allergen Reactions     Macrolides Hives     Macrolide antibiotics     Penicillins Hives     Sulfonamide Derivatives Hives     Sulfa         Reviewed and updated as needed this visit by clinical staffTobacco  Allergies  Meds  Problems  Med Hx  Surg Hx  Fam Hx  Soc Hx        Reviewed and updated as needed this visit by Provider         ROS:  Constitutional, HEENT, cardiovascular, pulmonary, gi and gu systems are negative, except as otherwise noted.      OBJECTIVE:   /70 (BP Location: Left arm, Patient Position: Sitting, Cuff Size: Adult Regular)  Pulse 87  Resp 16  Ht 5' 3\" (1.6 m)  Wt 175 lb (79.4 kg)  SpO2 98%  BMI 31 kg/m2  Body mass index is 31 kg/(m^2).  GENERAL: healthy, alert and no distress  PSYCH: mentation appears normal, affect normal/bright    Diagnostic Test Results:  none     ASSESSMENT/PLAN:     Depression; recurrent episode-- Moderate   Associated with the following complications:    None   Plan:  The following changes are made - Change Lexapro to Zoloft          ICD-10-CM    1. Major depressive disorder, recurrent episode, moderate (H) F33.1 sertraline (ZOLOFT) 50 MG tablet       FUTURE APPOINTMENTS:       - Follow-up visit in 3-4 weeks.      Ashley Allen MD  [facility]"

## 2017-08-10 ASSESSMENT — ANXIETY QUESTIONNAIRES: GAD7 TOTAL SCORE: 7

## 2017-09-06 ENCOUNTER — OFFICE VISIT (OUTPATIENT)
Dept: PSYCHOLOGY | Facility: OTHER | Age: 37
End: 2017-09-06
Attending: SOCIAL WORKER
Payer: COMMERCIAL

## 2017-09-06 DIAGNOSIS — F43.23 ADJUSTMENT DISORDER WITH MIXED ANXIETY AND DEPRESSED MOOD: Primary | ICD-10-CM

## 2017-09-06 PROCEDURE — 90837 PSYTX W PT 60 MINUTES: CPT | Performed by: SOCIAL WORKER

## 2017-09-06 ASSESSMENT — PATIENT HEALTH QUESTIONNAIRE - PHQ9: 5. POOR APPETITE OR OVEREATING: NOT AT ALL

## 2017-09-06 ASSESSMENT — ANXIETY QUESTIONNAIRES
2. NOT BEING ABLE TO STOP OR CONTROL WORRYING: NOT AT ALL
7. FEELING AFRAID AS IF SOMETHING AWFUL MIGHT HAPPEN: NOT AT ALL
GAD7 TOTAL SCORE: 0
3. WORRYING TOO MUCH ABOUT DIFFERENT THINGS: NOT AT ALL
6. BECOMING EASILY ANNOYED OR IRRITABLE: NOT AT ALL
IF YOU CHECKED OFF ANY PROBLEMS ON THIS QUESTIONNAIRE, HOW DIFFICULT HAVE THESE PROBLEMS MADE IT FOR YOU TO DO YOUR WORK, TAKE CARE OF THINGS AT HOME, OR GET ALONG WITH OTHER PEOPLE: NOT DIFFICULT AT ALL
1. FEELING NERVOUS, ANXIOUS, OR ON EDGE: NOT AT ALL
5. BEING SO RESTLESS THAT IT IS HARD TO SIT STILL: NOT AT ALL

## 2017-09-06 NOTE — MR AVS SNAPSHOT
After Visit Summary   9/6/2017    Etta Obrien    MRN: [id number]           Patient Information     Date Of Birth          1980        Visit Information        Provider Department      9/6/2017 8:00 AM Lilly Hinkle [facility]        Today's Diagnoses     Adjustment disorder with mixed anxiety and depressed mood    -  1       Follow-ups after your visit        Your next 10 appointments already scheduled     Oct 11, 2017  9:00 AM CDT   (Arrive by 8:45 AM)   [facility]    [address] [place] [address] [phone]              Who to contact     If you have questions or need follow up information about today's clinic visit or your schedule please contact [facility] directly at [phone].  Normal or non-critical lab and imaging results will be communicated to you by MyChart, letter or phone within 4 business days after the clinic has received the results. If you do not hear from us within 7 days, please contact the clinic through [employer]hart or phone. If you have a critical or abnormal lab result, we will notify you by phone as soon as possible.  Submit refill requests through [employer] or call your pharmacy and they will forward the refill request to us. Please allow 3 business days for your refill to be completed.          Additional Information About Your Visit        MyChart Information     [employer] gives you secure access to your electronic health record. If you see a primary care provider, you can also send messages to your care team and make appointments. If you have questions, please call your primary care clinic.  If you do not have a primary care provider, please call [phone] and they will assist you.        Care EveryWhere ID     This is your Care EveryWhere ID. This could be used by other organizations to access your [facility]  records  [id number]         Blood Pressure from Last 3 Encounters:   08/09/17 112/70   07/11/17 118/74   01/25/17 118/76    Weight from Last 3 Encounters:   08/09/17 175 lb (79.4 kg)   07/11/17 178 lb (80.7 kg)   01/25/17 163 lb (73.9 kg)              Today, you had the following     No orders found for display       Primary Care Provider Office Phone # Fax #    Ashley [facility] [facility] [phone] [address] [facility] [facility] [address]        Equal Access to Services     [facility]: Hadyun Linn, alice rivera, afshan shearer, bernardo long . So [facility] [phone].    ATENCIÓN: Si habla español, tiene a morrow disposición servicios gratuitos de asistencia lingüística. [facility] [phone].    We comply with applicable federal civil rights laws and [place] laws. We do not discriminate on the basis of race, color, national origin, age, disability sex, sexual orientation or gender identity.            Thank you!     Thank you for choosing [facility]  for your care. Our goal is always to provide you with excellent care. Hearing back from our patients is one way we can continue to improve our services. Please take a few minutes to complete the written survey that you may receive in the mail after your visit with us. Thank you!             Your Updated Medication List - Protect others around you: Learn how to safely use, store and throw away your medicines at www.disposemymeds.org.          This list is accurate as of: 9/6/17 11:59 PM.  Always use your most recent med list.                   Brand Name Dispense Instructions for use Diagnosis    BLISOVI FE 1/20 1-20 MG-MCG per tablet   Generic drug:  norethindrone-ethinyl estradiol     84 tablet    TAKE THE ACTIVE TABLET DAILY FOR AMENORRHEA.    Family planning       BuPROPion HCl ER (XL) 450 MG Tb24    FORFIVO XL    30 tablet    Take 450 mg by mouth daily    Postpartum  depression       levothyroxine 75 MCG tablet    SYNTHROID/LEVOTHROID    90 tablet    TAKE 1 TABLET BY MOUTH ONCE DAILY    H/O premature delivery, Hypothyroidism, unspecified type       sertraline 50 MG tablet    ZOLOFT    30 tablet    Take 1 tablet (50 mg) by mouth daily    Major depressive disorder, recurrent episode, moderate (H)

## 2017-09-18 ASSESSMENT — PATIENT HEALTH QUESTIONNAIRE - PHQ9: SUM OF ALL RESPONSES TO PHQ QUESTIONS 1-9: 1

## 2017-09-18 NOTE — PROGRESS NOTES
Progress Note    Client Name: Etta Obrien  Date: September 6, 2017         Service Type: Individual      Session Start Time: 8:00  Session End Time: 8:54      Session Length: 54 minutes     Session #: 11     Attendees: Client attended alone    DSM V Dx:Adjustment Disorders  309.28 (F43.23) With mixed anxiety and depressed mood  DA Date:9/14/2016    Treatment Plan Due: next visit  PHQ-9 :   PHQ-9 SCORE 7/11/2017 8/9/2017 9/6/2017   Total Score 5 7 1      ARNALDO-7 :    ARNALDO-7 SCORE 7/11/2017 8/9/2017 9/6/2017   Total Score 6 7 0           DATA      Progress Since Last Session (Related to Symptoms / Goals / Homework):   Symptoms: Improved    Homework: Achieved / completed to satisfaction     Current / Ongoing Stressors and Concerns:  Etta explained that she had a meltdown since our last session. She described a scene that was emotionally charged and included many different interpersonal stressors and relationships. Etta explained that because of this incident, she has began to distance herself from those she feels are unhealthy.     Treatment Objective(s) Addressed in This Session:   Objective A (Client Action)                   Client will learn and demonstrate CBT coping  skills      Intervention:   Provided psycho-education utilizing CBT-cognitive restructuring and how maladaptive thinking patterns had a role in her meltdown.     Response to Interventions:   Etta stated she understood this information.     ASSESSMENT: Current Emotional / Mental Status (status of significant symptoms):   Risk status (Self / Other harm or suicidal ideation)   Client denies current fears or concerns for personal safety.   Client denies current or recent suicidal ideation or behaviors.   Client denies current or recent homicidal ideation or behaviors.   Client denies current or recent self injurious behavior or ideation.   Client denies other safety concerns.   A safety and risk management plan  has not been developed at this time, however client was given the after-hours number / 911 should there be a change in any of these risk factors.     Appearance:   Appropriate    Eye Contact:   Good    Psychomotor Behavior: Normal    Attitude:   Cooperative    Orientation:   All   Speech    Rate / Production: Normal     Volume:  Normal    Mood:    Normal   Affect:    Appropriate    Thought Content:  Clear    Thought Form:  Coherent  Circumstantial   Insight:    Fair         Medication Compliance:   Yes     Changes in Health Issues:   None reported     Chemical Use Review:   Substance Use: Chemical use reviewed, no active concerns identified      Tobacco Use: No current tobacco use.       Collateral Reports Completed:   Not Applicable    PLAN: (Client Tasks / Therapist Tasks / Other)  Etta was asked to work on these skills at home and to return for follow up appointments.    Lilly Hinkle, [facility]

## 2017-09-19 ASSESSMENT — ANXIETY QUESTIONNAIRES: GAD7 TOTAL SCORE: 0

## 2017-10-06 RX ORDER — BUPROPION HYDROCHLORIDE 450 MG/1
TABLET, FILM COATED, EXTENDED RELEASE ORAL
Qty: 30 TABLET | Refills: 2 | Status: SHIPPED | OUTPATIENT
Start: 2017-10-06 | End: 2018-01-06

## 2017-10-06 NOTE — TELEPHONE ENCOUNTER
Forfivo  mg       Last Written Prescription Date: 7-10-17  Last Fill Quantity: 30; # refills: 2  Last Office Visit with G, P or [facility] prescribing provider:  8-9-17   Next 5 appointments (look out 90 days)     Oct 11, 2017  9:00 AM CDT   (Arrive by 8:45 AM)   Return Visit with Lilly Hinkle, [facility] [employer] ([facility] )    [address] [place] [id number] [phone]                   Last PHQ-9 score on record=   PHQ-9 SCORE 9/6/2017   Total Score 1       No results found for: AST  No results found for: ALT

## 2017-10-11 ENCOUNTER — OFFICE VISIT (OUTPATIENT)
Dept: PSYCHOLOGY | Facility: OTHER | Age: 37
End: 2017-10-11
Attending: SOCIAL WORKER
Payer: COMMERCIAL

## 2017-10-11 DIAGNOSIS — F43.23 ADJUSTMENT DISORDER WITH MIXED ANXIETY AND DEPRESSED MOOD: Primary | ICD-10-CM

## 2017-10-11 PROCEDURE — 90837 PSYTX W PT 60 MINUTES: CPT | Performed by: SOCIAL WORKER

## 2017-10-11 ASSESSMENT — PATIENT HEALTH QUESTIONNAIRE - PHQ9: 5. POOR APPETITE OR OVEREATING: NOT AT ALL

## 2017-10-11 ASSESSMENT — ANXIETY QUESTIONNAIRES
5. BEING SO RESTLESS THAT IT IS HARD TO SIT STILL: NOT AT ALL
2. NOT BEING ABLE TO STOP OR CONTROL WORRYING: NOT AT ALL
IF YOU CHECKED OFF ANY PROBLEMS ON THIS QUESTIONNAIRE, HOW DIFFICULT HAVE THESE PROBLEMS MADE IT FOR YOU TO DO YOUR WORK, TAKE CARE OF THINGS AT HOME, OR GET ALONG WITH OTHER PEOPLE: NOT DIFFICULT AT ALL
6. BECOMING EASILY ANNOYED OR IRRITABLE: NOT AT ALL
GAD7 TOTAL SCORE: 0
7. FEELING AFRAID AS IF SOMETHING AWFUL MIGHT HAPPEN: NOT AT ALL
1. FEELING NERVOUS, ANXIOUS, OR ON EDGE: NOT AT ALL
3. WORRYING TOO MUCH ABOUT DIFFERENT THINGS: NOT AT ALL

## 2017-10-11 NOTE — MR AVS SNAPSHOT
After Visit Summary   10/11/2017    Etta Obrien    MRN: [id number]           Patient Information     Date Of Birth          1980        Visit Information        Provider Department      10/11/2017 9:00 AM Lilly Hinkle [facility]        Today's Diagnoses     Adjustment disorder with mixed anxiety and depressed mood    -  1       Follow-ups after your visit        Your next 10 appointments already scheduled     Nov 16, 2017  8:00 AM CST   (Arrive by 7:45 AM)   Return Visit with Lilly Hinkle [facility] ([facility] )    [address] [place] [id number] [phone]              Who to contact     If you have questions or need follow up information about today's clinic visit or your schedule please contact [facility] directly at [phone].  Normal or non-critical lab and imaging results will be communicated to you by MyChart, letter or phone within 4 business days after the clinic has received the results. If you do not hear from us within 7 days, please contact the clinic through [employer] or phone. If you have a critical or abnormal lab result, we will notify you by phone as soon as possible.  Submit refill requests through [employer] or call your pharmacy and they will forward the refill request to us. Please allow 3 business days for your refill to be completed.          Additional Information About Your Visit        MyChart Information     [employer] gives you secure access to your electronic health record. If you see a primary care provider, you can also send messages to your care team and make appointments. If you have questions, please call your primary care clinic.  If you do not have a primary care provider, please call [phone] and they will assist you.        Care EveryWhere ID     This is your Care EveryWhere ID. This could be used by other organizations to access your [facility]  records  [id number]         Blood Pressure from Last 3 Encounters:   08/09/17 112/70   07/11/17 118/74   01/25/17 118/76    Weight from Last 3 Encounters:   08/09/17 175 lb (79.4 kg)   07/11/17 178 lb (80.7 kg)   01/25/17 163 lb (73.9 kg)              Today, you had the following     No orders found for display       Primary Care Provider Office Phone # Fax #    Ashley RIOS [address] Dem[facility]us [phone] [facility] [address]        Equal Access to Services     [facility]: Hadii isabelle Linn, alice rivera, afshan shearer, bernardo long . So [facility] [phone].    ATENCIÓN: Si habla español, tiene a morrow disposición servicios gratuitos de asistencia lingüística. [facility] [phone].    We comply with applicable federal civil rights laws and [place] laws. We do not discriminate on the basis of race, color, national origin, age, disability, sex, sexual orientation, or gender identity.            Thank you!     Thank you for choosing [facility]  for your care. Our goal is always to provide you with excellent care. Hearing back from our patients is one way we can continue to improve our services. Please take a few minutes to complete the written survey that you may receive in the mail after your visit with us. Thank you!             Your Updated Medication List - Protect others around you: Learn how to safely use, store and throw away your medicines at www.disposemymeds.org.          This list is accurate as of: 10/11/17 11:59 PM.  Always use your most recent med list.                   Brand Name Dispense Instructions for use Diagnosis    BLISOVI FE 1/20 1-20 MG-MCG per tablet   Generic drug:  norethindrone-ethinyl estradiol     84 tablet    TAKE THE ACTIVE TABLET DAILY FOR AMENORRHEA.    Family planning       FORFIVO  MG Tb24   Generic drug:  BuPROPion HCl ER (XL)     30 tablet    TAKE 1 TABLET BY MOUTH DAILY     Postpartum depression       levothyroxine 75 MCG tablet    SYNTHROID/LEVOTHROID    90 tablet    TAKE 1 TABLET BY MOUTH ONCE DAILY    H/O premature delivery, Hypothyroidism, unspecified type       sertraline 100 MG tablet    ZOLOFT    30 tablet    Take 1 tablet (100 mg) by mouth daily    Major depressive disorder, recurrent episode, moderate (H)

## 2017-10-24 ASSESSMENT — PATIENT HEALTH QUESTIONNAIRE - PHQ9: SUM OF ALL RESPONSES TO PHQ QUESTIONS 1-9: 2

## 2017-10-24 NOTE — PROGRESS NOTES
Progress Note    Client Name: Etta Obrien  Date: October 11, 2017         Service Type: Individual      Session Start Time: 9:00  Session End Time: 9:53      Session Length: 53 minutes     Session #: 12     Attendees: Client attended alone    DSM V Dx:Adjustment Disorders  309.28 (F43.23) With mixed anxiety and depressed mood  DA Date:9/14/2016    Treatment Plan Due: next visit  PHQ-9 :   PHQ-9 SCORE 8/9/2017 9/6/2017 10/11/2017   Total Score 7 1 2      ARNALDO-7 :    ARNALDO-7 SCORE 8/9/2017 9/6/2017 10/11/2017   Total Score 7 0 0           DATA      Progress Since Last Session (Related to Symptoms / Goals / Homework):   Symptoms: Improved    Homework: Achieved / completed to satisfaction     Current / Ongoing Stressors and Concerns:  Etta reports that she has been working at changing her thought processes and she believes this has been helping her mood and anxiety. Etta also reported that she does not feel she is holding onto anger like she had.     Treatment Objective(s) Addressed in This Session:   Objective A (Client Action)                   Client will learn and demonstrate CBT coping  skills      Intervention:   Provided psycho-education utilizing CBT-cognitive restructuring, educated on relapse prevention.     Response to Interventions:   Etta stated she understood this information.     ASSESSMENT: Current Emotional / Mental Status (status of significant symptoms):   Risk status (Self / Other harm or suicidal ideation)   Client denies current fears or concerns for personal safety.   Client denies current or recent suicidal ideation or behaviors.   Client denies current or recent homicidal ideation or behaviors.   Client denies current or recent self injurious behavior or ideation.   Client denies other safety concerns.   A safety and risk management plan has not been developed at this time, however client was given the after-hours number / 911 should there be a change  in any of these risk factors.     Appearance:   Appropriate    Eye Contact:   Good    Psychomotor Behavior: Normal    Attitude:   Cooperative    Orientation:   All   Speech    Rate / Production: Normal     Volume:  Normal    Mood:    Normal   Affect:    Appropriate    Thought Content:  Clear    Thought Form:  Coherent  Circumstantial   Insight:    Fair         Medication Compliance:   Yes     Changes in Health Issues:   None reported     Chemical Use Review:   Substance Use: Chemical use reviewed, no active concerns identified      Tobacco Use: No current tobacco use.       Collateral Reports Completed:   Not Applicable    PLAN: (Client Tasks / Therapist Tasks / Other)  Etta was asked to work on these skills at home and to return for follow up appointments.    Lilly Hinkle, [facility]

## 2017-10-25 ASSESSMENT — ANXIETY QUESTIONNAIRES: GAD7 TOTAL SCORE: 0

## 2017-11-16 ENCOUNTER — OFFICE VISIT (OUTPATIENT)
Dept: PSYCHOLOGY | Facility: OTHER | Age: 37
End: 2017-11-16
Attending: SOCIAL WORKER
Payer: COMMERCIAL

## 2017-11-16 DIAGNOSIS — F43.23 ADJUSTMENT DISORDER WITH MIXED ANXIETY AND DEPRESSED MOOD: Primary | ICD-10-CM

## 2017-11-16 PROCEDURE — 90837 PSYTX W PT 60 MINUTES: CPT | Performed by: SOCIAL WORKER

## 2017-11-16 ASSESSMENT — ANXIETY QUESTIONNAIRES
5. BEING SO RESTLESS THAT IT IS HARD TO SIT STILL: SEVERAL DAYS
3. WORRYING TOO MUCH ABOUT DIFFERENT THINGS: NOT AT ALL
2. NOT BEING ABLE TO STOP OR CONTROL WORRYING: NOT AT ALL
6. BECOMING EASILY ANNOYED OR IRRITABLE: NOT AT ALL
7. FEELING AFRAID AS IF SOMETHING AWFUL MIGHT HAPPEN: NOT AT ALL
1. FEELING NERVOUS, ANXIOUS, OR ON EDGE: NOT AT ALL
IF YOU CHECKED OFF ANY PROBLEMS ON THIS QUESTIONNAIRE, HOW DIFFICULT HAVE THESE PROBLEMS MADE IT FOR YOU TO DO YOUR WORK, TAKE CARE OF THINGS AT HOME, OR GET ALONG WITH OTHER PEOPLE: NOT DIFFICULT AT ALL
GAD7 TOTAL SCORE: 1

## 2017-11-16 ASSESSMENT — PATIENT HEALTH QUESTIONNAIRE - PHQ9: 5. POOR APPETITE OR OVEREATING: NOT AT ALL

## 2017-11-16 NOTE — MR AVS SNAPSHOT
After Visit Summary   11/16/2017    Etta Obrien    MRN: [id number]           Patient Information     Date Of Birth          1980        Visit Information        Provider Department      11/16/2017 8:00 AM Lilly Hinkle [facility]        Today's Diagnoses     Adjustment disorder with mixed anxiety and depressed mood    -  1       Follow-ups after your visit        Your next 10 appointments already scheduled     Dec 13, 2017 11:00 AM CST   (Arrive by 10:45 AM)   Return Visit with Lilly Hinkle [facility] ([facility] )    [address] [place] [address] [phone]              Who to contact     If you have questions or need follow up information about today's clinic visit or your schedule please contact [facility] directly at [phone].  Normal or non-critical lab and imaging results will be communicated to you by MyChart, letter or phone within 4 business days after the clinic has received the results. If you do not hear from us within 7 days, please contact the clinic through [employer] or phone. If you have a critical or abnormal lab result, we will notify you by phone as soon as possible.  Submit refill requests through [employer] or call your pharmacy and they will forward the refill request to us. Please allow 3 business days for your refill to be completed.          Additional Information About Your Visit        MyChart Information     [employer] gives you secure access to your electronic health record. If you see a primary care provider, you can also send messages to your care team and make appointments. If you have questions, please call your primary care clinic.  If you do not have a primary care provider, please call [phone] and they will assist you.        Care EveryWhere ID     This is your Care EveryWhere ID. This could be used by other organizations to access your [facility]  records  [id number]         Blood Pressure from Last 3 Encounters:   12/07/17 110/68   08/09/17 112/70   07/11/17 118/74    Weight from Last 3 Encounters:   12/07/17 178 lb (80.7 kg)   08/09/17 175 lb (79.4 kg)   07/11/17 178 lb (80.7 kg)              Today, you had the following     No orders found for display       Primary Care Provider Office Phone # Fax #    Ashley [address] [facility] [phone] [facility] [address]        Equal Access to Services     [facility]: Hadii isabelle Linn, alice rivera, afshan shearer, bernardo long . So [facility] [phone].    ATENCIÓN: Si habla español, tiene a morrow disposición servicios gratuitos de asistencia lingüística. [facility] [phone].    We comply with applicable federal civil rights laws and [place] laws. We do not discriminate on the basis of race, color, national origin, age, disability, sex, sexual orientation, or gender identity.            Thank you!     Thank you for choosing [facility]  for your care. Our goal is always to provide you with excellent care. Hearing back from our patients is one way we can continue to improve our services. Please take a few minutes to complete the written survey that you may receive in the mail after your visit with us. Thank you!             Your Updated Medication List - Protect others around you: Learn how to safely use, store and throw away your medicines at www.disposemymeds.org.          This list is accurate as of: 11/16/17 11:59 PM.  Always use your most recent med list.                   Brand Name Dispense Instructions for use Diagnosis    BLISOVI FE 1/20 1-20 MG-MCG per tablet   Generic drug:  norethindrone-ethinyl estradiol     84 tablet    TAKE THE ACTIVE TABLET DAILY FOR AMENORRHEA.    Family planning       FORFIVO  MG Tb24   Generic drug:  BuPROPion HCl ER (XL)     30 tablet    TAKE 1 TABLET BY MOUTH DAILY     Postpartum depression       levothyroxine 75 MCG tablet    SYNTHROID/LEVOTHROID    90 tablet    TAKE 1 TABLET BY MOUTH ONCE DAILY    H/O premature delivery, Hypothyroidism, unspecified type       sertraline 100 MG tablet    ZOLOFT    30 tablet    Take 1 tablet (100 mg) by mouth daily    Major depressive disorder, recurrent episode, moderate (H)

## 2017-12-07 ENCOUNTER — OFFICE VISIT (OUTPATIENT)
Dept: FAMILY MEDICINE | Facility: OTHER | Age: 37
End: 2017-12-07
Attending: FAMILY MEDICINE
Payer: COMMERCIAL

## 2017-12-07 VITALS
SYSTOLIC BLOOD PRESSURE: 110 MMHG | DIASTOLIC BLOOD PRESSURE: 68 MMHG | WEIGHT: 178 LBS | RESPIRATION RATE: 16 BRPM | HEIGHT: 63 IN | TEMPERATURE: 98.5 F | HEART RATE: 86 BPM | BODY MASS INDEX: 31.54 KG/M2 | OXYGEN SATURATION: 98 %

## 2017-12-07 DIAGNOSIS — N63.10 BREAST MASS, RIGHT: ICD-10-CM

## 2017-12-07 DIAGNOSIS — F33.1 MAJOR DEPRESSIVE DISORDER, RECURRENT EPISODE, MODERATE (H): Primary | ICD-10-CM

## 2017-12-07 PROCEDURE — 99214 OFFICE O/P EST MOD 30 MIN: CPT | Performed by: FAMILY MEDICINE

## 2017-12-07 ASSESSMENT — ANXIETY QUESTIONNAIRES
6. BECOMING EASILY ANNOYED OR IRRITABLE: NOT AT ALL
GAD7 TOTAL SCORE: 0
2. NOT BEING ABLE TO STOP OR CONTROL WORRYING: NOT AT ALL
5. BEING SO RESTLESS THAT IT IS HARD TO SIT STILL: NOT AT ALL
3. WORRYING TOO MUCH ABOUT DIFFERENT THINGS: NOT AT ALL
1. FEELING NERVOUS, ANXIOUS, OR ON EDGE: NOT AT ALL
4. TROUBLE RELAXING: NOT AT ALL
7. FEELING AFRAID AS IF SOMETHING AWFUL MIGHT HAPPEN: NOT AT ALL

## 2017-12-07 ASSESSMENT — PAIN SCALES - GENERAL: PAINLEVEL: NO PAIN (0)

## 2017-12-07 ASSESSMENT — PATIENT HEALTH QUESTIONNAIRE - PHQ9: SUM OF ALL RESPONSES TO PHQ QUESTIONS 1-9: 2

## 2017-12-07 NOTE — MR AVS SNAPSHOT
"              After Visit Summary   12/7/2017    Etta Obrien    MRN: [id number]           Patient Information     Date Of Birth          1980        Visit Information        Provider Department      12/7/2017 10:15 AM Ashley Allen MD [facility]        Today's Diagnoses     Major depressive disorder, recurrent episode, moderate (H)    -  1    Breast mass, right           Follow-ups after your visit        Follow-up notes from your care team     Return in about 6 months (around 6/7/2018).      Your next 10 appointments already scheduled     Dec 08, 2017  2:00 PM CST   (Arrive by 1:45 PM)   MA DIAGNOSTIC RIGHT LEYLA with HCMA1   [facility] [place] [facility] ([facility] [place] )    [address]  [place] [address]   [phone]           Do not use any powder, lotion or deodorant under your arms or on your breast. If you do, we will ask you to remove it before your exam.  Wear comfortable, two-piece clothing.  If you have any allergies, tell your care team.  Bring any previous mammograms from other facilities or have them mailed to the breast center.  Three-dimensional (3D) mammograms are available at [place] locations in [facility], [place], [place], [facility], [place], [place], and [place]. [facility] locations include [place] and [facility] in [place]. Benefits of 3D mammograms include: - Improved rate of cancer detection - Decreases your chance of having to go back for more tests, which means fewer: - \"False-positive\" results (This means that there is an abnormal area but it isn't cancer.) - Invasive testing procedures, such as a biopsy or surgery - Can provide clearer images of the breast if you have dense breast tissue. 3D mammography is an optional exam that anyone can have with a 2D mammogram. It doesn't replace or take the place of a 2D mammogram. 2D mammograms remain an effective screening test for all women.  Not all " insurance companies cover the cost of a 3D mammogram. Check with your insurance.            Dec 08, 2017  2:30 PM CST   US BREAST RIGHT LIMITED 1-3 QUAD with HIUS3   [facility] ULTRASOUND ([facility] )    [address] [place] [address]   [phone]           Please bring a list of your medicines (including vitamins, minerals and over-the-counter drugs). Also, tell your doctor about any allergies you may have. Wear comfortable clothes and leave your valuables at home.  You do not need to do anything special to prepare for your exam.  Please call the Imaging Department at your exam site with any questions.            Dec 13, 2017 11:00 AM CST   (Arrive by 10:45 AM)   Return Visit with Lilly Hinkle, [facility] ([facility] )    [address] [place] [address] [phone]              Future tests that were ordered for you today     Open Future Orders        Priority Expected Expires Ordered    US Breast Right Limited 1-3 Quadrants Routine  12/7/2018 12/7/2017    MA Diagnostic Right w/Neal Routine  12/7/2018 12/7/2017            Who to contact     If you have questions or need follow up information about today's clinic visit or your schedule please contact [facility] directly at [phone].  Normal or non-critical lab and imaging results will be communicated to you by MyChart, letter or phone within 4 business days after the clinic has received the results. If you do not hear from us within 7 days, please contact the clinic through MyChart or phone. If you have a critical or abnormal lab result, we will notify you by phone as soon as possible.  Submit refill requests through [employer] or call your pharmacy and they will forward the refill request to us. Please allow 3 business days for your refill to be completed.          Additional Information About Your Visit        [employer] Information     [employer] gives you secure access to  "your electronic health record. If you see a primary care provider, you can also send messages to your care team and make appointments. If you have questions, please call your primary care clinic.  If you do not have a primary care provider, please call [phone] and they will assist you.        Care EveryWhere ID     This is your Care EveryWhere ID. This could be used by other organizations to access your [place] medical records  [id number]        Your Vitals Were     Pulse Temperature Respirations Height Pulse Oximetry BMI (Body Mass Index)    86 98.5  F (36.9  C) (Tympanic) 16 5' 3\" (1.6 m) 98% 31.53 kg/m2       Blood Pressure from Last 3 Encounters:   12/07/17 110/68   08/09/17 112/70   07/11/17 118/74    Weight from Last 3 Encounters:   12/07/17 178 lb (80.7 kg)   08/09/17 175 lb (79.4 kg)   07/11/17 178 lb (80.7 kg)               Primary Care Provider Office Phone # Fax #    Ashley Allen [phone] [facility] [address]        Equal Access to Services     [facility]: Hadii aad ku hadasho Soomaali, waaxda luqadaha, qaybta kaalmada adeegyada, bernardo stolln chris long. So [facility] [phone].    ATENCIÓN: Si habla español, tiene a morrow disposición servicios gratuitos de asistencia lingüística. Raji al [phone].    We comply with applicable federal civil rights laws and [place] laws. We do not discriminate on the basis of race, color, national origin, age, disability, sex, sexual orientation, or gender identity.            Thank you!     Thank you for choosing [facility]  for your care. Our goal is always to provide you with excellent care. Hearing back from our patients is one way we can continue to improve our services. Please take a few minutes to complete the written survey that you may receive in the mail after your visit with us. Thank you!             Your Updated Medication List - Protect others around you: " Learn how to safely use, store and throw away your medicines at www.disposemymeds.org.          This list is accurate as of: 12/7/17  2:00 PM.  Always use your most recent med list.                   Brand Name Dispense Instructions for use Diagnosis    BLISOVI FE 1/20 1-20 MG-MCG per tablet   Generic drug:  norethindrone-ethinyl estradiol     84 tablet    TAKE THE ACTIVE TABLET DAILY FOR AMENORRHEA.    Family planning       FORFIVO  MG Tb24   Generic drug:  BuPROPion HCl ER (XL)     30 tablet    TAKE 1 TABLET BY MOUTH DAILY    Postpartum depression       levothyroxine 75 MCG tablet    SYNTHROID/LEVOTHROID    90 tablet    TAKE 1 TABLET BY MOUTH ONCE DAILY    H/O premature delivery, Hypothyroidism, unspecified type       sertraline 100 MG tablet    ZOLOFT    30 tablet    Take 1 tablet (100 mg) by mouth daily    Major depressive disorder, recurrent episode, moderate (H)

## 2017-12-07 NOTE — PROGRESS NOTES
SUBJECTIVE:   Etta Obrien is a 37 year old female who presents to clinic today for the following health issues:      Depression and Anxiety Follow-Up    Status since last visit: Improved     Other associated symptoms:None    Complicating factors:     Significant life event: Yes-  divorce     Current substance abuse: None    PHQ-9 Score and MyChart F/U Questions 2017 10/11/2017 2017   Total Score 1 2 2   Q9: Suicide Ideation Not at all Not at all Not at all     ARNALDO-7 SCORE 2017 10/11/2017 2017   Total Score 0 0 0     Advised the patient to go to the emergency room for assessment and immediate support  PHQ-9  English  PHQ-9   Any Language  GAD7  Suicide Assessment Five-step Evaluation and Treatment (SAFE-T)      Amount of exercise or physical activity: 4-5 days/week for an average of 45-60 minutes    Problems taking medications regularly: No    Medication side effects: fatigue and bruising more easily    Diet: regular (no restrictions)        Right breast mass      Duration: 2 days    Description (location/character/radiation): right breast    Intensity:  moderate    Accompanying signs and symptoms: no skin changes    History (similar episodes/previous evaluation): Positive family history of breast cancer and breast changes    Precipitating or alleviating factors: None    Therapies tried and outcome: None           Problem list and histories reviewed & adjusted, as indicated.  Additional history: as documented    Patient Active Problem List   Diagnosis     PCOS (polycystic ovarian syndrome)     Constipation, chronic     Hirsutism     Elevated testosterone level in female     Ovarian hyperthecosis     History of pre-term labor     Hypothyroidism     Infertility, anovulation     PVC's (premature ventricular contractions)     H/O premature delivery     Bilateral carpal tunnel syndrome      (spontaneous vaginal delivery)     Well female exam with routine gynecological exam     ACP (advance care  "planning)     Major depressive disorder, recurrent episode, moderate (H)     Past Surgical History:   Procedure Laterality Date     dental surgical procedure       LAPAROSCOPIC TUBAL DYE STUDY N/A 9/3/2014    Procedure: LAPAROSCOPIC TUBAL DYE STUDY;  Surgeon: Amanuel Purcell MD;  Location: [facility]       Social History   Substance Use Topics     Smoking status: Never Smoker     Smokeless tobacco: Never Used     Alcohol use No     Family History   Problem Relation Age of Onset     Lipids Father      hyperlipidemia     Hypertension Father      Lipids Mother      hyperlipidemia     Lupus Mother      erythematosus     DIABETES Other      Thyroid Disease Other      Asthma No family hx of          Current Outpatient Prescriptions   Medication Sig Dispense Refill     FORFIVO  MG TB24 TAKE 1 TABLET BY MOUTH DAILY 30 tablet 2     sertraline (ZOLOFT) 100 MG tablet Take 1 tablet (100 mg) by mouth daily 30 tablet 2     BLISOVI FE 1/20 1-20 MG-MCG per tablet TAKE THE ACTIVE TABLET DAILY FOR AMENORRHEA. 84 tablet 5     levothyroxine (SYNTHROID/LEVOTHROID) 75 MCG tablet TAKE 1 TABLET BY MOUTH ONCE DAILY 90 tablet 3     Allergies   Allergen Reactions     Macrolides Hives     Macrolide antibiotics     Penicillins Hives     Sulfonamide Derivatives Hives     Sulfa         Reviewed and updated as needed this visit by clinical staffTobacco  Allergies  Meds  Problems  Med Hx  Surg Hx  Fam Hx  Soc Hx        Reviewed and updated as needed this visit by Provider         ROS:  Constitutional, HEENT, cardiovascular, pulmonary, gi and gu systems are negative, except as otherwise noted.      OBJECTIVE:   /68 (BP Location: Left arm, Patient Position: Sitting, Cuff Size: Adult Large)  Pulse 86  Temp 98.5  F (36.9  C) (Tympanic)  Resp 16  Ht 5' 3\" (1.6 m)  Wt 178 lb (80.7 kg)  SpO2 98%  BMI 31.53 kg/m2  Body mass index is 31.53 kg/(m^2).  GENERAL: healthy, alert and no distress  BREAST: probable fibrocystic changes vs " possible cyst vs other upper right breast, mobile, nontender  PSYCH: mentation appears normal, affect normal/bright    Diagnostic Test Results:  none     ASSESSMENT/PLAN:     Depression; recurrent episode-- Moderate   Associated with the following complications:    None   Plan:  No changes in the patient's current treatment plan          ICD-10-CM    1. Major depressive disorder, recurrent episode, moderate (H) F33.1    2. Breast mass, right N63.10 MA Diagnostic Right w/Neal     US Breast Right Limited 1-3 Quadrants       FUTURE APPOINTMENTS:       - Follow-up visit in 6 months, sooner as needed.      Ashley Allen MD  [facility]

## 2017-12-07 NOTE — NURSING NOTE
"Chief Complaint   Patient presents with     Depression     follow up     Breast Mass       Initial /68 (BP Location: Left arm, Patient Position: Sitting, Cuff Size: Adult Large)  Pulse 86  Temp 98.5  F (36.9  C) (Tympanic)  Resp 16  Ht 5' 3\" (1.6 m)  Wt 178 lb (80.7 kg)  SpO2 98%  BMI 31.53 kg/m2 Estimated body mass index is 31.53 kg/(m^2) as calculated from the following:    Height as of this encounter: 5' 3\" (1.6 m).    Weight as of this encounter: 178 lb (80.7 kg).  Medication Reconciliation: complete     Roxann Shen      "

## 2017-12-08 ENCOUNTER — HOSPITAL ENCOUNTER (OUTPATIENT)
Dept: ULTRASOUND IMAGING | Facility: HOSPITAL | Age: 37
Discharge: HOME | End: 2017-12-08
Attending: FAMILY MEDICINE | Admitting: FAMILY MEDICINE
Payer: COMMERCIAL

## 2017-12-08 ENCOUNTER — RADIANT APPOINTMENT (OUTPATIENT)
Dept: MAMMOGRAPHY | Facility: OTHER | Age: 37
End: 2017-12-08
Attending: FAMILY MEDICINE
Payer: COMMERCIAL

## 2017-12-08 DIAGNOSIS — N63.10 BREAST MASS, RIGHT: ICD-10-CM

## 2017-12-08 PROCEDURE — G0204 DX MAMMO INCL CAD BI: HCPCS | Mod: TC

## 2017-12-08 PROCEDURE — 76642 ULTRASOUND BREAST LIMITED: CPT | Mod: TC,RT

## 2017-12-08 PROCEDURE — G0279 TOMOSYNTHESIS, MAMMO: HCPCS | Mod: TC

## 2017-12-08 ASSESSMENT — ANXIETY QUESTIONNAIRES: GAD7 TOTAL SCORE: 0

## 2017-12-08 NOTE — LETTER
[facility]  [address] [place] [id number]                                                                                                              Etta Obrien  [address] [place] [address]    December 8, 2017  Date of Exam: 12/8/17    Dear Etta:    Thank you for your recent visit.  Breast Imaging Result: We are pleased to inform you that the results of your recent breast imaging show no evidence of malignancy (cancer).    Breast Density: Your mammogram shows that you have dense breast tissue. This means you have a slightly higher risk of getting breast cancer. It also means your mammograms will be harder to read but it doesn't mean that mammograms aren t useful. In fact, yearly mammograms are even more important for women at higher risk.    If you are experiencing any breast problems such as a lump or localized pain we request that you discuss this with your health care provider if you haven t already done so, as additional testing may be necessary.    As you know, early detection of cancer is very important. Although mammography is the most accurate method for early detection, not all cancers are found through mammography. A thorough examination includes a combination of mammography, physical examination and breast self-examination. Currently the American College of Radiology and the Society of Breast Imaging recommend an annual mammogram for all women beginning at the age of 40.    A report of your breast imaging results was sent to: Ashley Allen    Your breast imaging will become part of your medical file here at [facility] for at least 10 years. You are responsible for informing any new health care provider or breast imaging facility of the date and location of this examination.    We appreciate the opportunity to participate in your health care.    Sincerely,    Ramon Whitfield MD  Interpreting Radiologist  [facility]

## 2017-12-11 ENCOUNTER — TELEPHONE (OUTPATIENT)
Dept: OBGYN | Facility: OTHER | Age: 37
End: 2017-12-11

## 2017-12-11 DIAGNOSIS — R92.30 DENSE BREAST TISSUE ON MAMMOGRAM: ICD-10-CM

## 2017-12-11 DIAGNOSIS — N63.10 LUMP OF RIGHT BREAST: Primary | ICD-10-CM

## 2017-12-11 NOTE — TELEPHONE ENCOUNTER
Patient would like your opinion about a breast lump she felt about the size of a half dollar. She saw Dr. Carrillo who ordered a mammogram and ultrasound. The lump was not seen on either image, but did show that she has nadine dense breasts. The patient, Dr. Carrillo and the mammogram tech all felt the lump. Radiologist recommends mammogram in 1 year and she is not comfortable with this answer due to the dense breasts.     Do you have a recommendation for her?

## 2017-12-11 NOTE — TELEPHONE ENCOUNTER
Please do referral to Dr. Hayden in [place] for opinion on right breast lump. Please send this phone call encounter; last visit with Dr. Carrillo; mammogram and ultrasound reports; and images.

## 2017-12-12 NOTE — TELEPHONE ENCOUNTER
Faxed to Dr. Hayden and requested to have US and Mammogram to be pushed to them.    Tel: [phone]  Fax: [phone]

## 2017-12-13 ENCOUNTER — OFFICE VISIT (OUTPATIENT)
Dept: PSYCHOLOGY | Facility: OTHER | Age: 37
End: 2017-12-13
Attending: SOCIAL WORKER
Payer: COMMERCIAL

## 2017-12-13 DIAGNOSIS — F43.23 ADJUSTMENT DISORDER WITH MIXED ANXIETY AND DEPRESSED MOOD: Primary | ICD-10-CM

## 2017-12-13 PROCEDURE — 90837 PSYTX W PT 60 MINUTES: CPT | Performed by: SOCIAL WORKER

## 2017-12-13 ASSESSMENT — ANXIETY QUESTIONNAIRES
1. FEELING NERVOUS, ANXIOUS, OR ON EDGE: NOT AT ALL
6. BECOMING EASILY ANNOYED OR IRRITABLE: NOT AT ALL
2. NOT BEING ABLE TO STOP OR CONTROL WORRYING: NOT AT ALL
5. BEING SO RESTLESS THAT IT IS HARD TO SIT STILL: NOT AT ALL
3. WORRYING TOO MUCH ABOUT DIFFERENT THINGS: NOT AT ALL
7. FEELING AFRAID AS IF SOMETHING AWFUL MIGHT HAPPEN: NOT AT ALL
GAD7 TOTAL SCORE: 0

## 2017-12-13 ASSESSMENT — PATIENT HEALTH QUESTIONNAIRE - PHQ9
5. POOR APPETITE OR OVEREATING: NOT AT ALL
SUM OF ALL RESPONSES TO PHQ QUESTIONS 1-9: 2

## 2017-12-13 NOTE — PROGRESS NOTES
Progress Note    Client Name: Etta Obrien  Date: November 16, 2017         Service Type: Individual      Session Start Time: 7:58  Session End Time: 8:53      Session Length: 55 minutes     Session #: 13     Attendees: Client attended alone    DSM V Dx:Adjustment Disorders  309.28 (F43.23) With mixed anxiety and depressed mood  DA Date:9/14/2016    Treatment Plan Due: next visit  PHQ-9 :   PHQ-9 SCORE 10/11/2017 11/16/2017 12/7/2017   Total Score 2 2 2      ARNALDO-7 :    ARNALDO-7 SCORE 10/11/2017 11/16/2017 12/7/2017   Total Score 0 1 0           DATA      Progress Since Last Session (Related to Symptoms / Goals / Homework):   Symptoms: Improved    Homework: Achieved / completed to satisfaction     Current / Ongoing Stressors and Concerns:  Etta continues to report that she is doing well. She is reporting increased tiredness and headaches. Etta also discussed ongoing interpersonal communication issues in some relationships. She reports how these issues then lead to maladaptive thoughts.     Treatment Objective(s) Addressed in This Session:   Objective A (Client Action)                   Client will learn and demonstrate CBT coping  skills      Intervention:   Provided psycho-education utilizing CBT-cognitive restructuring, educated on emotional intelligence and communication skills.     Response to Interventions:   Etta stated she understood this information.     ASSESSMENT: Current Emotional / Mental Status (status of significant symptoms):   Risk status (Self / Other harm or suicidal ideation)   Client denies current fears or concerns for personal safety.   Client denies current or recent suicidal ideation or behaviors.   Client denies current or recent homicidal ideation or behaviors.   Client denies current or recent self injurious behavior or ideation.   Client denies other safety concerns.   A safety and risk management plan has not been developed at this time, however  client was given the after-hours number / 911 should there be a change in any of these risk factors.     Appearance:   Appropriate    Eye Contact:   Good    Psychomotor Behavior: Normal    Attitude:   Cooperative    Orientation:   All   Speech    Rate / Production: Normal     Volume:  Normal    Mood:    Normal   Affect:    Appropriate    Thought Content:  Clear    Thought Form:  Coherent  Circumstantial   Insight:    Fair         Medication Compliance:   Yes     Changes in Health Issues:   None reported     Chemical Use Review:   Substance Use: Chemical use reviewed, no active concerns identified      Tobacco Use: No current tobacco use.       Collateral Reports Completed:   Not Applicable    PLAN: (Client Tasks / Therapist Tasks / Other)  Etta was asked to work on these skills at home and to return for follow up appointments.    Lilly Hinkle, [facility] Health CareSW

## 2017-12-13 NOTE — MR AVS SNAPSHOT
After Visit Summary   12/13/2017    Etta Obrien    MRN: [id number]           Patient Information     Date Of Birth          1980        Visit Information        Provider Department      12/13/2017 11:00 AM Lilly Hinkle LICSW [facility]        Today's Diagnoses     Adjustment disorder with mixed anxiety and depressed mood    -  1       Follow-ups after your visit        Who to contact     If you have questions or need follow up information about today's clinic visit or your schedule please contact [facility] directly at [phone].  Normal or non-critical lab and imaging results will be communicated to you by [employer], letter or phone within 4 business days after the clinic has received the results. If you do not hear from us within 7 days, please contact the clinic through [employer] or phone. If you have a critical or abnormal lab result, we will notify you by phone as soon as possible.  Submit refill requests through [employer] or call your pharmacy and they will forward the refill request to us. Please allow 3 business days for your refill to be completed.          Additional Information About Your Visit        MyChart Information     [employer] gives you secure access to your electronic health record. If you see a primary care provider, you can also send messages to your care team and make appointments. If you have questions, please call your primary care clinic.  If you do not have a primary care provider, please call [phone] and they will assist you.        Care EveryWhere ID     This is your Care EveryWhere ID. This could be used by other organizations to access your [facility] medical records  [id number]         Blood Pressure from Last 3 Encounters:   12/07/17 110/68   08/09/17 112/70   07/11/17 118/74    Weight from Last 3 Encounters:   12/07/17 178 lb (80.7 kg)   08/09/17 175 lb (79.4 kg)   07/11/17 178 lb (80.7 kg)              Today, you had the  following     No orders found for display       Primary Care Provider Office Phone # Fax #    Ashley Allen [phone] [address] [facility] [address]        Equal Access to Services     MARILU LONG: Hadii aad ku hadcarymissael Sojerzy, waaxda luqadaha, qaybta kaalmada janki, bernardo emilyin hayaamery sharmablaineelisabeth long. So [facility] [phone].    ATENCIÓN: Si habla español, tiene a morrow disposición servicios gratuitos de asistencia lingüística. Llame al [phone].    We comply with applicable federal civil rights laws and [place] laws. We do not discriminate on the basis of race, color, national origin, age, disability, sex, sexual orientation, or gender identity.            Thank you!     Thank you for choosing [facility]  for your care. Our goal is always to provide you with excellent care. Hearing back from our patients is one way we can continue to improve our services. Please take a few minutes to complete the written survey that you may receive in the mail after your visit with us. Thank you!             Your Updated Medication List - Protect others around you: Learn how to safely use, store and throw away your medicines at www.disposemymeds.org.          This list is accurate as of: 12/13/17 11:59 PM.  Always use your most recent med list.                   Brand Name Dispense Instructions for use Diagnosis    BLISOVI FE 1/20 1-20 MG-MCG per tablet   Generic drug:  norethindrone-ethinyl estradiol     84 tablet    TAKE THE ACTIVE TABLET DAILY FOR AMENORRHEA.    Family planning       FORFIVO  MG Tb24   Generic drug:  BuPROPion HCl ER (XL)     30 tablet    TAKE 1 TABLET BY MOUTH DAILY    Postpartum depression       levothyroxine 75 MCG tablet    SYNTHROID/LEVOTHROID    90 tablet    TAKE 1 TABLET BY MOUTH ONCE DAILY    H/O premature delivery, Hypothyroidism, unspecified type

## 2017-12-14 ASSESSMENT — ANXIETY QUESTIONNAIRES: GAD7 TOTAL SCORE: 1

## 2017-12-21 ENCOUNTER — TRANSFERRED RECORDS (OUTPATIENT)
Dept: HEALTH INFORMATION MANAGEMENT | Facility: HOSPITAL | Age: 37
End: 2017-12-21

## 2018-01-01 ASSESSMENT — PATIENT HEALTH QUESTIONNAIRE - PHQ9: SUM OF ALL RESPONSES TO PHQ QUESTIONS 1-9: 2

## 2018-01-02 ASSESSMENT — ANXIETY QUESTIONNAIRES: GAD7 TOTAL SCORE: 0

## 2018-01-02 NOTE — PROGRESS NOTES
Progress Note    Client Name: Etta Obrien  Date: December 13, 2017         Service Type: Individual      Session Start Time: 11:00  Session End Time: 11:54      Session Length: 54 minutes     Session #: 14     Attendees: Client attended alone    DSM V Dx:Adjustment Disorders  309.28 (F43.23) With mixed anxiety and depressed mood  DA Date:9/14/2016    Treatment Plan Due: next visit  PHQ-9 :   PHQ-9 SCORE 11/16/2017 12/7/2017 12/13/2017   Total Score 2 2 2      ARNALDO-7 :    ARNALDO-7 SCORE 11/16/2017 12/7/2017 12/13/2017   Total Score 1 0 0           DATA      Progress Since Last Session (Related to Symptoms / Goals / Homework):   Symptoms: Improved    Homework: Achieved / completed to satisfaction     Current / Ongoing Stressors and Concerns:  Etta reports continued improvement in symptoms. She explained that she has a few distressing activities coming up, however, she has been able to use positive coping skills to alleviate symptoms.      Treatment Objective(s) Addressed in This Session:   Objective A (Client Action)                   Client will learn and demonstrate CBT coping  skills      Intervention:   Provided psycho-education utilizing CBT- relapse prevention.      Response to Interventions:   Etta stated she understood this information.     ASSESSMENT: Current Emotional / Mental Status (status of significant symptoms):   Risk status (Self / Other harm or suicidal ideation)   Client denies current fears or concerns for personal safety.   Client denies current or recent suicidal ideation or behaviors.   Client denies current or recent homicidal ideation or behaviors.   Client denies current or recent self injurious behavior or ideation.   Client denies other safety concerns.   A safety and risk management plan has not been developed at this time, however client was given the after-hours number / 911 should there be a change in any of these risk  factors.     Appearance:   Appropriate    Eye Contact:   Good    Psychomotor Behavior: Normal    Attitude:   Cooperative    Orientation:   All   Speech    Rate / Production: Normal     Volume:  Normal    Mood:    Normal   Affect:    Appropriate    Thought Content:  Clear    Thought Form:  Coherent  Circumstantial   Insight:    Fair         Medication Compliance:   Yes     Changes in Health Issues:   None reported     Chemical Use Review:   Substance Use: Chemical use reviewed, no active concerns identified      Tobacco Use: No current tobacco use.       Collateral Reports Completed:   Not Applicable    PLAN: (Client Tasks / Therapist Tasks / Other)  It was agreed upon that this would be Etta's last appointment. She was informed that she could return to [facility] if needed.    INDERJIT RowlandSW

## 2018-01-08 NOTE — TELEPHONE ENCOUNTER
forfivo      Last Written Prescription Date:  10/6/17  Last Fill Quantity: 30,   # refills: 2  Last Office Visit: 12/8/17  Future Office visit: none

## 2018-01-09 RX ORDER — BUPROPION HYDROCHLORIDE 450 MG/1
TABLET, FILM COATED, EXTENDED RELEASE ORAL
Qty: 30 TABLET | Refills: 2 | Status: SHIPPED | OUTPATIENT
Start: 2018-01-09 | End: 2018-04-03

## 2018-01-15 DIAGNOSIS — E03.9 HYPOTHYROIDISM, UNSPECIFIED TYPE: ICD-10-CM

## 2018-01-15 DIAGNOSIS — Z87.51 H/O PREMATURE DELIVERY: ICD-10-CM

## 2018-01-16 RX ORDER — LEVOTHYROXINE SODIUM 75 UG/1
TABLET ORAL
Qty: 90 TABLET | Refills: 0 | OUTPATIENT
Start: 2018-01-16

## 2018-01-16 NOTE — TELEPHONE ENCOUNTER
Refill request received for synthroid 75 mcg. On this dose since at least 2013. Last TSH was 10/2015. Last seen 1/2017. Next annual 5/2018.  Still has future order in lab for TSH from last visit, but didn't go to lab yet    Called patient to remind her to stop at lab for TSH. Patient says she just did TSH a few weeks ago at her endocrinologists office. She will call endocrinology for refill.    Please DECLINE refill request.

## 2018-03-12 ENCOUNTER — HOSPITAL ENCOUNTER (OUTPATIENT)
Dept: ULTRASOUND IMAGING | Facility: HOSPITAL | Age: 38
Discharge: HOME | End: 2018-03-12
Attending: FAMILY MEDICINE | Admitting: FAMILY MEDICINE
Payer: COMMERCIAL

## 2018-03-12 ENCOUNTER — OFFICE VISIT (OUTPATIENT)
Dept: FAMILY MEDICINE | Facility: OTHER | Age: 38
End: 2018-03-12
Attending: FAMILY MEDICINE
Payer: COMMERCIAL

## 2018-03-12 VITALS
SYSTOLIC BLOOD PRESSURE: 112 MMHG | DIASTOLIC BLOOD PRESSURE: 70 MMHG | BODY MASS INDEX: 32.77 KG/M2 | HEART RATE: 90 BPM | OXYGEN SATURATION: 98 % | WEIGHT: 185 LBS | TEMPERATURE: 98.5 F

## 2018-03-12 DIAGNOSIS — M79.652 PAIN OF LEFT THIGH: ICD-10-CM

## 2018-03-12 DIAGNOSIS — M79.652 PAIN OF LEFT THIGH: Primary | ICD-10-CM

## 2018-03-12 DIAGNOSIS — F33.1 MAJOR DEPRESSIVE DISORDER, RECURRENT EPISODE, MODERATE (H): ICD-10-CM

## 2018-03-12 DIAGNOSIS — Z30.09 FAMILY PLANNING: ICD-10-CM

## 2018-03-12 PROCEDURE — 99214 OFFICE O/P EST MOD 30 MIN: CPT | Performed by: FAMILY MEDICINE

## 2018-03-12 PROCEDURE — 93971 EXTREMITY STUDY: CPT | Mod: TC,LT

## 2018-03-12 ASSESSMENT — ANXIETY QUESTIONNAIRES
5. BEING SO RESTLESS THAT IT IS HARD TO SIT STILL: SEVERAL DAYS
2. NOT BEING ABLE TO STOP OR CONTROL WORRYING: NOT AT ALL
3. WORRYING TOO MUCH ABOUT DIFFERENT THINGS: NOT AT ALL
IF YOU CHECKED OFF ANY PROBLEMS ON THIS QUESTIONNAIRE, HOW DIFFICULT HAVE THESE PROBLEMS MADE IT FOR YOU TO DO YOUR WORK, TAKE CARE OF THINGS AT HOME, OR GET ALONG WITH OTHER PEOPLE: NOT DIFFICULT AT ALL
GAD7 TOTAL SCORE: 1
6. BECOMING EASILY ANNOYED OR IRRITABLE: NOT AT ALL
7. FEELING AFRAID AS IF SOMETHING AWFUL MIGHT HAPPEN: NOT AT ALL
1. FEELING NERVOUS, ANXIOUS, OR ON EDGE: NOT AT ALL

## 2018-03-12 ASSESSMENT — PATIENT HEALTH QUESTIONNAIRE - PHQ9: 5. POOR APPETITE OR OVEREATING: NOT AT ALL

## 2018-03-12 ASSESSMENT — PAIN SCALES - GENERAL: PAINLEVEL: SEVERE PAIN (7)

## 2018-03-12 NOTE — LETTER
My Depression Action Plan  Name: Etta Obrien   Date of Birth 1980  Date: 3/12/2018    My doctor: Ashley Allen   My clinic: [facility]  [address] [place] [address] [address]  [facility] [address]  [phone]          GREEN    ZONE   Good Control    What it looks like:     Things are going generally well. You have normal up s and down s. You may even feel depressed from time to time, but bad moods usually last less than a day.   What you need to do:  1. Continue to care for yourself (see self care plan)  2. Check your depression survival kit and update it as needed  3. Follow your physician s recommendations including any medication.  4. Do not stop taking medication unless you consult with your physician first.           YELLOW         ZONE Getting Worse    What it looks like:     Depression is starting to interfere with your life.     It may be hard to get out of bed; you may be starting to isolate yourself from others.    Symptoms of depression are starting to last most all day and this has happened for several days.     You may have suicidal thoughts but they are not constant.   What you need to do:     1. Call your care team, your response to treatment will improve if you keep your care team informed of your progress. Yellow periods are signs an adjustment may need to be made.     2. Continue your self-care, even if you have to fake it!    3. Talk to someone in your support network    4. Open up your depression survival kit           RED    ZONE Medical Alert - Get Help    What it looks like:     Depression is seriously interfering with your life.     You may experience these or other symptoms: You can t get out of bed most days, can t work or engage in other necessary activities, you have trouble taking care of basic hygiene, or basic responsibilities, thoughts of suicide or death that will not go away, self-injurious behavior.     What you need to do:  1. Call your care team and  request a same-day appointment. If they are not available (weekends or after hours) call your local crisis line, emergency room or 911.      Electronically signed by: Barb Matson, March 12, 2018    Depression Self Care Plan / Survival Kit    Self-Care for Depression  Here s the deal. Your body and mind are really not as separate as most people think.  What you do and think affects how you feel and how you feel influences what you do and think. This means if you do things that people who feel good do, it will help you feel better.  Sometimes this is all it takes.  There is also a place for medication and therapy depending on how severe your depression is, so be sure to consult with your medical provider and/ or Behavioral Health Consultant if your symptoms are worsening or not improving.     In order to better manage my stress, I will:    Exercise  Get some form of exercise, every day. This will help reduce pain and release endorphins, the  feel good  chemicals in your brain. This is almost as good as taking antidepressants!  This is not the same as joining a gym and then never going! (they count on that by the way ) It can be as simple as just going for a walk or doing some gardening, anything that will get you moving.      Hygiene   Maintain good hygiene (Get out of bed in the morning, Make your bed, Brush your teeth, Take a shower, and Get dressed like you were going to work, even if you are unemployed).  If your clothes don't fit try to get ones that do.    Diet  I will strive to eat foods that are good for me, drink plenty of water, and avoid excessive sugar, caffeine, alcohol, and other mood-altering substances.  Some foods that are helpful in depression are: complex carbohydrates, B vitamins, flaxseed, fish or fish oil, fresh fruits and vegetables.    Psychotherapy  I agree to participate in Individual Therapy (if recommended).    Medication  If prescribed medications, I agree to take them.  Missing doses  can result in serious side effects.  I understand that drinking alcohol, or other illicit drug use, may cause potential side effects.  I will not stop my medication abruptly without first discussing it with my provider.    Staying Connected With Others  I will stay in touch with my friends, family members, and my primary care provider/team.    Use your imagination  Be creative.  We all have a creative side; it doesn t matter if it s oil painting, sand castles, or mud pies! This will also kick up the endorphins.    Witness Beauty  (AKA stop and smell the roses) Take a look outside, even in mid-winter. Notice colors, textures. Watch the squirrels and birds.     Service to others  Be of service to others.  There is always someone else in need.  By helping others we can  get out of ourselves  and remember the really important things.  This also provides opportunities for practicing all the other parts of the program.    Humor  Laugh and be silly!  Adjust your TV habits for less news and crime-drama and more comedy.    Control your stress  Try breathing deep, massage therapy, biofeedback, and meditation. Find time to relax each day.     My support system    Clinic Contact:  Phone number:    Contact 1:  Phone number:    Contact 2:  Phone number:    Jain/:  Phone number:    Therapist:  Phone number:    Local crisis center:    Phone number:    Other community support:  Phone number:

## 2018-03-12 NOTE — MR AVS SNAPSHOT
After Visit Summary   3/12/2018    Etta Obrien    MRN: [id number]           Patient Information     Date Of Birth          1980        Visit Information        Provider Department      3/12/2018 10:00 AM Ashley Allen MD [facility]        Today's Diagnoses     Pain of left thigh    -  1    Major depressive disorder, recurrent episode, moderate (H)           Follow-ups after your visit        Follow-up notes from your care team     Return if symptoms worsen or fail to improve.      Your next 10 appointments already scheduled     May 09, 2018  8:30 AM CDT   (Arrive by 8:15 AM)   PHYSICAL with Ellie Leal MD   [facility] [place] ([facility] [place] )    [address]  [place] [address]   [phone]              Future tests that were ordered for you today     Open Future Orders        Priority Expected Expires Ordered    US Lower Extremity Venous Duplex Left STAT  3/12/2019 3/12/2018            Who to contact     If you have questions or need follow up information about today's clinic visit or your schedule please contact [facility] directly at [phone].  Normal or non-critical lab and imaging results will be communicated to you by MyChart, letter or phone within 4 business days after the clinic has received the results. If you do not hear from us within 7 days, please contact the clinic through [employer] or phone. If you have a critical or abnormal lab result, we will notify you by phone as soon as possible.  Submit refill requests through [facility] or call your pharmacy and they will forward the refill request to us. Please allow 3 business days for your refill to be completed.          Additional Information About Your Visit        [employer] Information     [facility] gives you secure access to your electronic health record. If you see a primary care provider, you can also send messages to your care team and make appointments. If you  have questions, please call your primary care clinic.  If you do not have a primary care provider, please call [phone] and they will assist you.        Care EveryWhere ID     This is your Care EveryWhere ID. This could be used by other organizations to access your [facility] medical records  [id number]        Your Vitals Were     Pulse Temperature Pulse Oximetry BMI (Body Mass Index)          90 98.5  F (36.9  C) (Tympanic) 98% 32.77 kg/m2         Blood Pressure from Last 3 Encounters:   03/12/18 112/70   12/07/17 110/68   08/09/17 112/70    Weight from Last 3 Encounters:   03/12/18 185 lb (83.9 kg)   12/07/17 178 lb (80.7 kg)   08/09/17 175 lb (79.4 kg)              We Performed the Following     DEPRESSION ACTION PLAN (DAP)        Primary Care Provider Office Phone # Fax #    Ashley Allen [phone] [facility] [address]        Equal Access to Services     [facility] : Hadii isabelle montague hadasho Soomaali, waaxda luqadaha, qaybta kaalmada adeegyacici, bernardo long . So [facility] [phone].    ATENCIÓN: Si habla español, tiene a morrow disposición servicios gratuitos de asistencia lingüística. Llame al [phone].    We comply with applicable federal civil rights laws and [place] laws. We do not discriminate on the basis of race, color, national origin, age, disability, sex, sexual orientation, or gender identity.            Thank you!     Thank you for choosing [facility]  for your care. Our goal is always to provide you with excellent care. Hearing back from our patients is one way we can continue to improve our services. Please take a few minutes to complete the written survey that you may receive in the mail after your visit with us. Thank you!             Your Updated Medication List - Protect others around you: Learn how to safely use, store and throw away your medicines at www.disposemymeds.org.          This list  is accurate as of 3/12/18  4:56 PM.  Always use your most recent med list.                   Brand Name Dispense Instructions for use Diagnosis    BLISOVI FE 1/20 1-20 MG-MCG per tablet   Generic drug:  norethindrone-ethinyl estradiol     84 tablet    TAKE THE ACTIVE TABLET DAILY FOR AMENORRHEA.    Family planning       FORFIVO  MG Tb24   Generic drug:  BuPROPion HCl ER (XL)     30 tablet    TAKE 1 TABLET BY MOUTH DAILY    Postpartum depression       levothyroxine 75 MCG tablet    SYNTHROID/LEVOTHROID    90 tablet    TAKE 1 TABLET BY MOUTH ONCE DAILY    H/O premature delivery, Hypothyroidism, unspecified type       sertraline 100 MG tablet    ZOLOFT    30 tablet    TAKE 1 TABLET (100 MG) BY MOUTH DAILY    Major depressive disorder, recurrent episode, moderate (H)

## 2018-03-12 NOTE — PROGRESS NOTES
SUBJECTIVE:   Etta Obrien is a 37 year old female who presents to clinic today for the following health issues:      Concern - left thigh pain  Onset: yesterday, patient did fall about a week ago and bruised that leg.    Description:   Left thigh pain    Intensity: moderate    Progression of Symptoms:  worsening    Accompanying Signs & Symptoms:  Warmth to touch    Previous history of similar problem:   no    Precipitating factors:   Worsened by: walking    Alleviating factors:  Improved by: resting    Therapies Tried and outcome: none        Depression and Anxiety Follow-Up    Status since last visit: stable    Other associated symptoms:None    Complicating factors:     Significant life event: No     Current substance abuse: None    PHQ-9 2017 2017 3/12/2018   Total Score 2 2 2   Q9: Suicide Ideation Not at all Not at all Not at all     ARNALDO-7 SCORE 2017 2017 3/12/2018   Total Score 0 0 1       PHQ-9  English  PHQ-9   Any Language  ARNALDO-7  Suicide Assessment Five-step Evaluation and Treatment (SAFE-T)    Problem list and histories reviewed & adjusted, as indicated.  Additional history: as documented    Patient Active Problem List   Diagnosis     PCOS (polycystic ovarian syndrome)     Constipation, chronic     Hirsutism     Elevated testosterone level in female     Ovarian hyperthecosis     History of pre-term labor     Hypothyroidism     Infertility, anovulation     PVC's (premature ventricular contractions)     H/O premature delivery     Bilateral carpal tunnel syndrome      (spontaneous vaginal delivery)     Well female exam with routine gynecological exam     ACP (advance care planning)     Major depressive disorder, recurrent episode, moderate (H)     Past Surgical History:   Procedure Laterality Date     dental surgical procedure       LAPAROSCOPIC TUBAL DYE STUDY N/A 9/3/2014    Procedure: LAPAROSCOPIC TUBAL DYE STUDY;  Surgeon: Amanuel Purcell MD;  Location: [facility] OR       [facility]  History   Substance Use Topics     Smoking status: Never Smoker     Smokeless tobacco: Never Used     Alcohol use No     Family History   Problem Relation Age of Onset     Lipids Father      hyperlipidemia     Hypertension Father      Lipids Mother      hyperlipidemia     Lupus Mother      erythematosus     DIABETES Other      Thyroid Disease Other      Asthma No family hx of          Current Outpatient Prescriptions   Medication Sig Dispense Refill     FORFIVO  MG TB24 TAKE 1 TABLET BY MOUTH DAILY 30 tablet 2     sertraline (ZOLOFT) 100 MG tablet TAKE 1 TABLET (100 MG) BY MOUTH DAILY 30 tablet 5     BLISOVI FE 1/20 1-20 MG-MCG per tablet TAKE THE ACTIVE TABLET DAILY FOR AMENORRHEA. 84 tablet 5     levothyroxine (SYNTHROID/LEVOTHROID) 75 MCG tablet TAKE 1 TABLET BY MOUTH ONCE DAILY 90 tablet 3     Allergies   Allergen Reactions     Macrolides Hives     Macrolide antibiotics     Penicillins Hives     Sulfonamide Derivatives Hives     Sulfa       Reviewed and updated as needed this visit by clinical staff  Tobacco  Allergies  Meds       Reviewed and updated as needed this visit by Provider         ROS:  Constitutional, HEENT, cardiovascular, pulmonary, gi and gu systems are negative, except as otherwise noted.    OBJECTIVE:     /70 (BP Location: Left arm, Patient Position: Sitting, Cuff Size: Adult Large)  Pulse 90  Temp 98.5  F (36.9  C) (Tympanic)  Wt 185 lb (83.9 kg)  SpO2 98%  BMI 32.77 kg/m2  Body mass index is 32.77 kg/(m^2).  GENERAL: healthy, alert and no distress  MS: no erythema, warmth, or swelling of the left inner thigh, mild tenderness to palpation, no masses palpated  PSYCH: mentation appears normal, affect normal/bright    Diagnostic Test Results:  Call from Radiology, ultrasound negative for DVT or other clots      ASSESSMENT/PLAN:     1. Pain of left thigh  Ultrasound negative, possibly symptomatic deeper varicose vein or muscular.  Ice and heat recommended.  Follow-up if no  improvement noted.  - US Lower Extremity Venous Duplex Left; Future    2. Major depressive disorder, recurrent episode, moderate (H)  No changes to current medications, patient is doing well.  - DEPRESSION ACTION PLAN (DAP)        Ashley Allen MD  [facility]

## 2018-03-12 NOTE — NURSING NOTE
"Chief Complaint   Patient presents with     Pain     Depression     Screen due       Initial /70 (BP Location: Left arm, Patient Position: Sitting, Cuff Size: Adult Large)  Pulse 90  Temp 98.5  F (36.9  C) (Tympanic)  Wt 185 lb (83.9 kg)  SpO2 98%  BMI 32.77 kg/m2 Estimated body mass index is 32.77 kg/(m^2) as calculated from the following:    Height as of 12/7/17: 5' 3\" (1.6 m).    Weight as of this encounter: 185 lb (83.9 kg).  Medication Reconciliation: complete   aBrb Matson      "

## 2018-03-13 RX ORDER — NORETHINDRONE ACETATE AND ETHINYL ESTRADIOL AND FERROUS FUMARATE 1MG-20(21)
KIT ORAL
Qty: 112 TABLET | Refills: 4 | Status: SHIPPED | OUTPATIENT
Start: 2018-03-13 | End: 2018-05-09

## 2018-03-13 ASSESSMENT — ANXIETY QUESTIONNAIRES: GAD7 TOTAL SCORE: 1

## 2018-03-13 ASSESSMENT — PATIENT HEALTH QUESTIONNAIRE - PHQ9: SUM OF ALL RESPONSES TO PHQ QUESTIONS 1-9: 2

## 2018-03-13 NOTE — TELEPHONE ENCOUNTER
MICROGESTIN 1/20 FE TABS 28S    Last Written Prescription Date:  2/27/2017  Last Fill Quantity: 84,   # refills: 5  Last Office Visit: 3/12/2018  Future Office visit:    Next 5 appointments (look out 90 days)     May 09, 2018  8:30 AM CDT   (Arrive by 8:15 AM)   PHYSICAL with Ellie Leal MD   [facility] ([facility] )    [address] [place] [address]   [phone]

## 2018-03-13 NOTE — TELEPHONE ENCOUNTER
Refill request received for OCA's for 37 year-old non-smoker last seen January 2017 with next annual scheduled with Dr. Leal in May.    OK to fill to annual?  Please sign or refuse RX in this encounter.

## 2018-05-09 ENCOUNTER — OFFICE VISIT (OUTPATIENT)
Dept: OBGYN | Facility: OTHER | Age: 38
End: 2018-05-09
Attending: OBSTETRICS & GYNECOLOGY
Payer: COMMERCIAL

## 2018-05-09 VITALS
SYSTOLIC BLOOD PRESSURE: 110 MMHG | DIASTOLIC BLOOD PRESSURE: 80 MMHG | HEIGHT: 63 IN | WEIGHT: 190 LBS | HEART RATE: 60 BPM | BODY MASS INDEX: 33.66 KG/M2

## 2018-05-09 DIAGNOSIS — Z20.2 POSSIBLE EXPOSURE TO STD: ICD-10-CM

## 2018-05-09 DIAGNOSIS — Z30.09 FAMILY PLANNING: ICD-10-CM

## 2018-05-09 DIAGNOSIS — N62 GYNECOMASTIA, FEMALE: ICD-10-CM

## 2018-05-09 DIAGNOSIS — Z00.00 ROUTINE GENERAL MEDICAL EXAMINATION AT A HEALTH CARE FACILITY: Primary | ICD-10-CM

## 2018-05-09 DIAGNOSIS — N63.10 BREAST MASS, RIGHT: ICD-10-CM

## 2018-05-09 LAB
C TRACH DNA SPEC QL PROBE+SIG AMP: NOT DETECTED
CHOLEST SERPL-MCNC: 219 MG/DL
ERYTHROCYTE [DISTWIDTH] IN BLOOD BY AUTOMATED COUNT: 12.1 % (ref 10–15)
HCT VFR BLD AUTO: 42.9 % (ref 35–47)
HDLC SERPL-MCNC: 52 MG/DL
HGB BLD-MCNC: 15.1 G/DL (ref 11.7–15.7)
LDLC SERPL CALC-MCNC: 126 MG/DL
MCH RBC QN AUTO: 31.5 PG (ref 26.5–33)
MCHC RBC AUTO-ENTMCNC: 35.2 G/DL (ref 31.5–36.5)
MCV RBC AUTO: 89 FL (ref 78–100)
N GONORRHOEA DNA SPEC QL PROBE+SIG AMP: NOT DETECTED
NONHDLC SERPL-MCNC: 167 MG/DL
PLATELET # BLD AUTO: 284 10E9/L (ref 150–450)
RBC # BLD AUTO: 4.8 10E12/L (ref 3.8–5.2)
SPECIMEN SOURCE: NORMAL
SPECIMEN SOURCE: NORMAL
TRIGL SERPL-MCNC: 207 MG/DL
WBC # BLD AUTO: 7.9 10E9/L (ref 4–11)
WET PREP SPEC: NORMAL

## 2018-05-09 PROCEDURE — 36415 COLL VENOUS BLD VENIPUNCTURE: CPT | Performed by: OBSTETRICS & GYNECOLOGY

## 2018-05-09 PROCEDURE — 99395 PREV VISIT EST AGE 18-39: CPT | Performed by: OBSTETRICS & GYNECOLOGY

## 2018-05-09 PROCEDURE — 87389 HIV-1 AG W/HIV-1&-2 AB AG IA: CPT | Mod: 90 | Performed by: OBSTETRICS & GYNECOLOGY

## 2018-05-09 PROCEDURE — 87624 HPV HI-RISK TYP POOLED RSLT: CPT | Mod: 90 | Performed by: OBSTETRICS & GYNECOLOGY

## 2018-05-09 PROCEDURE — 86803 HEPATITIS C AB TEST: CPT | Mod: 90 | Performed by: OBSTETRICS & GYNECOLOGY

## 2018-05-09 PROCEDURE — 87591 N.GONORRHOEAE DNA AMP PROB: CPT | Performed by: OBSTETRICS & GYNECOLOGY

## 2018-05-09 PROCEDURE — 87491 CHLMYD TRACH DNA AMP PROBE: CPT | Performed by: OBSTETRICS & GYNECOLOGY

## 2018-05-09 PROCEDURE — 80061 LIPID PANEL: CPT | Performed by: OBSTETRICS & GYNECOLOGY

## 2018-05-09 PROCEDURE — 87210 SMEAR WET MOUNT SALINE/INK: CPT | Performed by: OBSTETRICS & GYNECOLOGY

## 2018-05-09 PROCEDURE — 87340 HEPATITIS B SURFACE AG IA: CPT | Mod: 90 | Performed by: OBSTETRICS & GYNECOLOGY

## 2018-05-09 PROCEDURE — 99000 SPECIMEN HANDLING OFFICE-LAB: CPT | Performed by: OBSTETRICS & GYNECOLOGY

## 2018-05-09 PROCEDURE — 85027 COMPLETE CBC AUTOMATED: CPT | Performed by: OBSTETRICS & GYNECOLOGY

## 2018-05-09 PROCEDURE — 88142 CYTOPATH C/V THIN LAYER: CPT | Performed by: OBSTETRICS & GYNECOLOGY

## 2018-05-09 PROCEDURE — 86780 TREPONEMA PALLIDUM: CPT | Mod: 90 | Performed by: OBSTETRICS & GYNECOLOGY

## 2018-05-09 RX ORDER — NORETHINDRONE ACETATE AND ETHINYL ESTRADIOL 1MG-20(21)
KIT ORAL
Qty: 112 TABLET | Refills: 4 | Status: SHIPPED | OUTPATIENT
Start: 2018-05-09 | End: 2019-05-14

## 2018-05-09 ASSESSMENT — ANXIETY QUESTIONNAIRES
3. WORRYING TOO MUCH ABOUT DIFFERENT THINGS: NOT AT ALL
2. NOT BEING ABLE TO STOP OR CONTROL WORRYING: NOT AT ALL
1. FEELING NERVOUS, ANXIOUS, OR ON EDGE: NOT AT ALL
7. FEELING AFRAID AS IF SOMETHING AWFUL MIGHT HAPPEN: NOT AT ALL
6. BECOMING EASILY ANNOYED OR IRRITABLE: NOT AT ALL
5. BEING SO RESTLESS THAT IT IS HARD TO SIT STILL: SEVERAL DAYS
IF YOU CHECKED OFF ANY PROBLEMS ON THIS QUESTIONNAIRE, HOW DIFFICULT HAVE THESE PROBLEMS MADE IT FOR YOU TO DO YOUR WORK, TAKE CARE OF THINGS AT HOME, OR GET ALONG WITH OTHER PEOPLE: NOT DIFFICULT AT ALL
GAD7 TOTAL SCORE: 1

## 2018-05-09 ASSESSMENT — PATIENT HEALTH QUESTIONNAIRE - PHQ9: 5. POOR APPETITE OR OVEREATING: NOT AT ALL

## 2018-05-09 ASSESSMENT — PAIN SCALES - GENERAL: PAINLEVEL: NO PAIN (0)

## 2018-05-09 NOTE — PROGRESS NOTES
ANNUAL    Etta is a 37 year old  female who presents for annual exam.   Youngest Child: 2.5  Largest Child: 7.2  Gestational Diabetes: n Hypertension: n  No LMP recorded. Patient is not currently having periods (Reason: Birth Control).  Menses: na pain na Contraception na .  Planning pregnancy: na  Concerns in addition to routine health maintenance?  breast.  GYNECOLOGIC HISTORY:   Surgery: n  History sexually transmitted infections:No STD history   Safe?  y  STI testing offered?  y  History of abnormal Pap smear: n  Problems with sex? n  Family history of: breast cancer: mgm   Uterine cancer n ovarian cancer: n   colon cancer: n    HEALTH MAINTENANCE:  Cholesterol: (  Cholesterol   Date Value Ref Range Status   2016 168 <200 mg/dL Final    History of abnormal lipids: n  Mammo: y . History of abnormal Mammo:n   Regular Self Breast Exams: y Calcium/Vitamin D or Vitamin: y Exercise y    TSH: (seeing Jloynn ALFREDO)  TSH   Date Value Ref Range Status   10/13/2015 2.81 0.40 - 4.00 mU/L Final    )  Pap; (  Lab Results   Component Value Date    PAP NIL 2017    PAP NIL 2016    PAP NIL 2013    )    HISTORY:  Obstetric History       T1      L2     SAB0   TAB0   Ectopic0   Multiple0   Live Births2       # Outcome Date GA Lbr Wilber/2nd Weight Sex Delivery Anes PTL Lv   2 Term 10/13/15 38w0d 05:47 / 00:20 7 lb 1.2 oz (3.209 kg) M Vag-Spont TOPICAL,Local,EPI N GIULIANA      Name: [place]      Apgar1:  9                Apgar5: 9   1  12/05/10 36w4d 03:50 / 00:17 5 lb 13 oz (2.637 kg) F Vag-Vacuum INT Y GIULIANA      Name: Jenny      Complications:  labor,Fetal bradycardia      Apgar1:  4                Apgar5: 5        Past Medical History:   Diagnosis Date     Constipation, chronic      Gestational diabetes      Hirsutism      PCOS (polycystic ovarian syndrome)      Spina bifida occulta      Unspecified hypothyroidism      Past Surgical History:   Procedure Laterality Date     dental  surgical procedure       LAPAROSCOPIC TUBAL DYE STUDY N/A 9/3/2014    Procedure: LAPAROSCOPIC TUBAL DYE STUDY;  Surgeon: Amanuel Purcell MD;  Location: [facility] OR     Family History   Problem Relation Age of Onset     Lipids Father      hyperlipidemia     Hypertension Father      Lipids Mother      hyperlipidemia     Lupus Mother      erythematosus     DIABETES Other      Thyroid Disease Other      Asthma No family hx of      Social History     Social History     Marital status:      Spouse name: N/A     Number of children: N/A     Years of education: N/A     Social History Main Topics     Smoking status: Never Smoker     Smokeless tobacco: Never Used     Alcohol use No     Drug use: No     Sexual activity: Yes     Partners: Male     Other Topics Concern     Parent/Sibling W/ Cabg, Mi Or Angioplasty Before 65f 55m? No     Social History Narrative       Current Outpatient Prescriptions:      FORFIVO  MG TB24, TAKE 1 TABLET BY MOUTH DAILY, Disp: 30 tablet, Rfl: 3     LEVOTHYROXINE SODIUM PO, Take 100 mcg by mouth, Disp: , Rfl:      MICROGESTIN FE 1/20 1-20 MG-MCG per tablet, TAKE 1 TABLET BY MOUTH DAILY. TAKE ACTIVE TABLETS ONLY, Disp: 112 tablet, Rfl: 4     sertraline (ZOLOFT) 100 MG tablet, TAKE 1 TABLET BY MOUTH DAILY, Disp: 30 tablet, Rfl: 4     Allergies   Allergen Reactions     Macrolides Hives     Macrolide antibiotics     Penicillins Hives     Sulfonamide Derivatives Hives     Sulfa       Past medical, surgical, social and family history were reviewed and updated in [facility].    ROS:    CONSTITUTIONAL:     NEGATIVE for fever, chills, change in weight  INTEGUMENTARY/SKIN:       NEGATIVE for worrisome rashes, moles or lesions  EYES:     NEGATIVE for vision changes or irritation  ENT/MOUTH: NEGATIVE for ear, mouth and throat problems  RESP:     NEGATIVE for significant cough or SOB  CV:   NEGATIVE for chest pain, palpitations or peripheral edema  GI:     NEGATIVE for nausea, abdominal pain, heartburn, or  "change in bowel habits  :   NEGATIVE for frequency, dysuria, hematuria, vaginal discharge, or irregular bleeding,incontinence   MUSCULOSKELETAL:     NEGATIVE for significant arthralgias or myalgia  NEURO:      NEGATIVE for weakness, dizziness or paresthesias  ENDOCRINE:      NEGATIVE for temperature intolerance, skin/hair changes  PSYCHIATRIC:      NEGATIVE for changes in mood or affect.     EXAM:   /80  Pulse 60  Ht 5' 3\" (1.6 m)  Wt 190 lb (86.2 kg)  BMI 33.66 kg/m2   BMI: Body mass index is 33.66 kg/(m^2).  Constitutional: healthy, alert and no distress  Head: Normocephalic. No masses, lesions, tenderness or abnormalities  Neck: Neck supple. Trachea midline. No adenopathy. Thyroid symmetric, normal size.   Cardiovascular: RRR.   Respiratory: lungs clear   Breast: Breasts reveal mild symmetric fibrocystic densities, but there are no dominant, discrete, fixed or suspicious masses found.  Gastrointestinal: Abdomen soft, non-tender, non-distended. No masses, organomegaly.  :  Vulva:  No external lesions, normal female hair distribution, no inguinal adenopathy.    Urethra:  Midline, non-tender, well supported, no discharge  Vagina:  Moist, pink, no abnormal discharge, no lesions  Cervix: clean   Uterus:   Normal size,  , non-tender, freely mobile  Ovaries:  No masses appreciated  Rectal Exam: not done  Musculoskeletal: extremities normal  Skin: no suspicious lesions or rashes  Psychiatric: Affect appropriate, cooperative,mentation appears normal.     COUNSELING:   regular exercise  healthy diet/nutrition  Immunizations   contraception  family planning  Folic Acid Counseling     reports that she has never smoked. She has never used smokeless tobacco.  Tobacco Cessation Action Plan: na  Body mass index is 33.66 kg/(m^2).  Weight management plan: working on it  FRAX Risk Assessment    ASSESSMENT:  37 year old female with satisfactory annual exam  Gynecomastia    PLAN:   Pap with High Risk hpv,gc,ct,wet " prep  fasting cholesterol with lipid profile,HP,hiv,hep,trep  [place] for f/u  Check [facility]  Return to office: 1 year [place]  continue exercise  Plastic Surgery referral to Dr. Vang or Rafiq for breast reduction  Considering weaning zoloft    Greater than 0 minutes spent discussing other than annual     Ellie Leal MD

## 2018-05-09 NOTE — MR AVS SNAPSHOT
After Visit Summary   5/9/2018    Etta Obrien    MRN: [id number]           Patient Information     Date Of Birth          1980        Visit Information        Provider Department      5/9/2018 8:30 AM Ellie Leal MD [facility] [place]        Today's Diagnoses     Routine general medical examination at a health care facility    -  1    Family planning        Possible exposure to STD        Breast mass, right          Care Instructions    Pap test, Gonorrhea and Chlamydia and wet prep done today.  Due for fasting labs.    See Dr. Hayden for follow-up of breast mass. A referral will be sent to his office to call you with an appointment time. If you have not been contacted by his office in a timely manner, please call our office at [phone].    A referral will be placed for breast reduction. We will see who Dr. Hayden recommends.    Continue exercise.  Can consider weaning Zoloft.  Return for annual exam in 1 year with Dr. Carrillo.            Follow-ups after your visit        Additional Services     GENERAL SURG ADULT REFERRAL       Your provider has referred you to: Dr. Rudolph Hayden, general surgery [place] for follow-up of breast mass.    Please be aware that coverage of these services is subject to the terms and limitations of your health insurance plan.  Call member services at your health plan with any benefit or coverage questions.      Please bring the following with you to your appointment:    (1) Any X-Rays, CTs or MRIs which have been performed.  Contact the facility where they were done to arrange for  prior to your scheduled appointment.   (2) List of current medications   (3) This referral request   (4) Any documents/labs given to you for this referral                  Who to contact     If you have questions or need follow up information about today's clinic visit or your schedule please contact [facility] directly at [phone].  Normal or  "non-critical lab and imaging results will be communicated to you by MyChart, letter or phone within 4 business days after the clinic has received the results. If you do not hear from us within 7 days, please contact the clinic through [employer] or phone. If you have a critical or abnormal lab result, we will notify you by phone as soon as possible.  Submit refill requests through [employer] or call your pharmacy and they will forward the refill request to us. Please allow 3 business days for your refill to be completed.          Additional Information About Your Visit        [employer] Information     [employer] gives you secure access to your electronic health record. If you see a primary care provider, you can also send messages to your care team and make appointments. If you have questions, please call your primary care clinic.  If you do not have a primary care provider, please call [phone] and they will assist you.        Care EveryWhere ID     This is your Care EveryWhere ID. This could be used by other organizations to access your [facility] medical records  [id number]        Your Vitals Were     Pulse Height BMI (Body Mass Index)             60 5' 3\" (1.6 m) 33.66 kg/m2          Blood Pressure from Last 3 Encounters:   05/09/18 110/80   03/12/18 112/70   12/07/17 110/68    Weight from Last 3 Encounters:   05/09/18 190 lb (86.2 kg)   03/12/18 185 lb (83.9 kg)   12/07/17 178 lb (80.7 kg)              We Performed the Following     A pap thin layer screen with  HPV - recommended age 30 - 65 years (select HPV order below)     CBC with platelets     GC/Chlamydia by PCR - [facility],     GENERAL SURG ADULT REFERRAL     Hepatitis B surface antigen     Hepatitis C antibody     HIV Antigen Antibody Combo     HPV High Risk Types DNA Cervical     Lipid Profile     Treponema Abs w Reflex to RPR and Titer     Wet prep          Today's Medication Changes          These changes are accurate as of 5/9/18  9:17 AM.  If you have any " questions, ask your nurse or doctor.               These medicines have changed or have updated prescriptions.        Dose/Directions    norethindrone-ethinyl estradiol 1-20 MG-MCG per tablet   Commonly known as:  MICROGESTIN FE 1/20   This may have changed:  See the new instructions.   Used for:  Family planning   Changed by:  Ellie Leal MD        TAKE 1 TABLET BY MOUTH DAILY. TAKE ACTIVE TABLETS ONLY   Quantity:  112 tablet   Refills:  4            Where to get your medicines      These medications were sent to [facility] [address] [address]  AT [facility] [address] [facility] [address]     Phone:  [phone]     norethindrone-ethinyl estradiol 1-20 MG-MCG per tablet                Primary Care Provider Office Phone # Fax #    Ashley Allen [phone] [address] [facility] [address]        Equal Access to Services     MARILU WHITEHEAD : Hadii isabelle montague hadasho Soomaali, waaxda luqadaha, qaybta kaalmada adeegyada, bernardo diazin addi long . So [facility] [phone].    ATENCIÓN: Si habla español, tiene a morrow disposición servicios gratuitos de asistencia lingüística. [facility] [phone].    We comply with applicable federal civil rights laws and [place] laws. We do not discriminate on the basis of race, color, national origin, age, disability, sex, sexual orientation, or gender identity.            Thank you!     Thank you for choosing [facility]  for your care. Our goal is always to provide you with excellent care. Hearing back from our patients is one way we can continue to improve our services. Please take a few minutes to complete the written survey that you may receive in the mail after your visit with us. Thank you!             Your Updated Medication List - Protect others around you: Learn how to safely use, store and throw away your medicines at www.disposemymeds.org.           This list is accurate as of 5/9/18  9:17 AM.  Always use your most recent med list.                   Brand Name Dispense Instructions for use Diagnosis    FORFIVO  MG Tb24   Generic drug:  BuPROPion HCl ER (XL)     30 tablet    TAKE 1 TABLET BY MOUTH DAILY    Postpartum depression       LEVOTHYROXINE SODIUM PO      Take 100 mcg by mouth        norethindrone-ethinyl estradiol 1-20 MG-MCG per tablet    MICROGESTIN FE 1/20    112 tablet    TAKE 1 TABLET BY MOUTH DAILY. TAKE ACTIVE TABLETS ONLY    Family planning       sertraline 100 MG tablet    ZOLOFT    30 tablet    TAKE 1 TABLET BY MOUTH DAILY    Major depressive disorder, recurrent episode, moderate (H)

## 2018-05-09 NOTE — NURSING NOTE
"Chief Complaint   Patient presents with     Gyn Exam       Initial /80  Pulse 60  Ht 5' 3\" (1.6 m)  Wt 190 lb (86.2 kg)  BMI 33.66 kg/m2 Estimated body mass index is 33.66 kg/(m^2) as calculated from the following:    Height as of this encounter: 5' 3\" (1.6 m).    Weight as of this encounter: 190 lb (86.2 kg).  Medication Reconciliation: complete       Michelle Johnson LPN    "

## 2018-05-09 NOTE — PATIENT INSTRUCTIONS
Pap test, Gonorrhea and Chlamydia and wet prep done today.  Due for fasting labs.    See Dr. Hayden for follow-up of breast mass. A referral will be sent to his office to call you with an appointment time. If you have not been contacted by his office in a timely manner, please call our office at [phone].    A referral will be placed for breast reduction. We will see who Dr. Hayden recommends.    Continue exercise.  Can consider weaning Zoloft.  Return for annual exam in 1 year with Dr. Carrillo.

## 2018-05-10 LAB
HBV SURFACE AG SERPL QL IA: NONREACTIVE
HCV AB SERPL QL IA: NONREACTIVE
HIV 1+2 AB+HIV1 P24 AG SERPL QL IA: NONREACTIVE
T PALLIDUM AB SER QL: NONREACTIVE

## 2018-05-10 ASSESSMENT — PATIENT HEALTH QUESTIONNAIRE - PHQ9: SUM OF ALL RESPONSES TO PHQ QUESTIONS 1-9: 0

## 2018-05-10 ASSESSMENT — ANXIETY QUESTIONNAIRES: GAD7 TOTAL SCORE: 1

## 2018-05-16 LAB
COPATH REPORT: NORMAL
PAP: NORMAL

## 2018-05-17 LAB
FINAL DIAGNOSIS: ABNORMAL
HPV HR 12 DNA CVX QL NAA+PROBE: POSITIVE
HPV16 DNA SPEC QL NAA+PROBE: NEGATIVE
HPV18 DNA SPEC QL NAA+PROBE: NEGATIVE
SPECIMEN DESCRIPTION: ABNORMAL
SPECIMEN SOURCE CVX/VAG CYTO: ABNORMAL

## 2018-05-19 PROBLEM — B97.7 HIGH RISK HPV INFECTION: Status: ACTIVE | Noted: 2018-05-19

## 2018-07-22 ENCOUNTER — TRANSFERRED RECORDS (OUTPATIENT)
Dept: HEALTH INFORMATION MANAGEMENT | Facility: CLINIC | Age: 38
End: 2018-07-22

## 2018-07-22 LAB
ALT SERPL-CCNC: 12 IU/L (ref 6–31)
AST SERPL-CCNC: 7 IU/L (ref 10–40)
CREAT SERPL-MCNC: 0.76 MG/DL (ref 0.4–1)
GLUCOSE SERPL-MCNC: 96 MG/DL (ref 70–100)
POTASSIUM SERPL-SCNC: 3.8 MEQ/L (ref 3.4–5.1)

## 2018-08-24 RX ORDER — BUPROPION HYDROCHLORIDE 450 MG/1
TABLET, FILM COATED, EXTENDED RELEASE ORAL
Qty: 30 TABLET | Refills: 2 | Status: SHIPPED | OUTPATIENT
Start: 2018-08-24 | End: 2018-11-27

## 2018-09-24 ENCOUNTER — OFFICE VISIT (OUTPATIENT)
Dept: FAMILY MEDICINE | Facility: OTHER | Age: 38
End: 2018-09-24
Attending: FAMILY MEDICINE
Payer: COMMERCIAL

## 2018-09-24 VITALS
WEIGHT: 195 LBS | BODY MASS INDEX: 34.55 KG/M2 | HEART RATE: 97 BPM | OXYGEN SATURATION: 98 % | TEMPERATURE: 97.9 F | HEIGHT: 63 IN | SYSTOLIC BLOOD PRESSURE: 130 MMHG | RESPIRATION RATE: 16 BRPM | DIASTOLIC BLOOD PRESSURE: 66 MMHG

## 2018-09-24 DIAGNOSIS — R92.30 DENSE BREAST TISSUE: ICD-10-CM

## 2018-09-24 DIAGNOSIS — G89.29 CHRONIC PAIN OF BOTH SHOULDERS: ICD-10-CM

## 2018-09-24 DIAGNOSIS — M25.512 CHRONIC PAIN OF BOTH SHOULDERS: ICD-10-CM

## 2018-09-24 DIAGNOSIS — M25.511 CHRONIC PAIN OF BOTH SHOULDERS: ICD-10-CM

## 2018-09-24 DIAGNOSIS — Z01.818 PREOP GENERAL PHYSICAL EXAM: Primary | ICD-10-CM

## 2018-09-24 LAB
ERYTHROCYTE [DISTWIDTH] IN BLOOD BY AUTOMATED COUNT: 12.4 % (ref 10–15)
HCG UR QL: NEGATIVE
HCT VFR BLD AUTO: 41.9 % (ref 35–47)
HGB BLD-MCNC: 14.5 G/DL (ref 11.7–15.7)
MCH RBC QN AUTO: 31.3 PG (ref 26.5–33)
MCHC RBC AUTO-ENTMCNC: 34.6 G/DL (ref 31.5–36.5)
MCV RBC AUTO: 90 FL (ref 78–100)
PLATELET # BLD AUTO: 271 10E9/L (ref 150–450)
RBC # BLD AUTO: 4.64 10E12/L (ref 3.8–5.2)
WBC # BLD AUTO: 8.8 10E9/L (ref 4–11)

## 2018-09-24 PROCEDURE — 36415 COLL VENOUS BLD VENIPUNCTURE: CPT | Performed by: FAMILY MEDICINE

## 2018-09-24 PROCEDURE — 85027 COMPLETE CBC AUTOMATED: CPT | Performed by: FAMILY MEDICINE

## 2018-09-24 PROCEDURE — 81025 URINE PREGNANCY TEST: CPT | Performed by: FAMILY MEDICINE

## 2018-09-24 PROCEDURE — 99214 OFFICE O/P EST MOD 30 MIN: CPT | Performed by: FAMILY MEDICINE

## 2018-09-24 ASSESSMENT — ANXIETY QUESTIONNAIRES

## 2018-09-24 ASSESSMENT — PATIENT HEALTH QUESTIONNAIRE - PHQ9: 5. POOR APPETITE OR OVEREATING: NOT AT ALL

## 2018-09-24 ASSESSMENT — PAIN SCALES - GENERAL: PAINLEVEL: NO PAIN (0)

## 2018-09-24 NOTE — MR AVS SNAPSHOT
After Visit Summary   9/24/2018    Etta Obrien    MRN: [id number]           Patient Information     Date Of Birth          1980        Visit Information        Provider Department      9/24/2018 1:30 PM Ashley Allen MD [facility]        Today's Diagnoses     Preop general physical exam    -  1    Dense breast tissue        Chronic pain of both shoulders          Care Instructions      Before Your Surgery      Call your surgeon if there is any change in your health. This includes signs of a cold or flu (such as a sore throat, runny nose, cough, rash or fever).    Do not smoke, drink alcohol or take over the counter medicine (unless your surgeon or primary care doctor tells you to) for the 24 hours before and after surgery.    If you take prescribed drugs: Follow your doctor s orders about which medicines to take and which to stop until after surgery.    Eating and drinking prior to surgery: follow the instructions from your surgeon    Take a shower or bath the night before surgery. Use the soap your surgeon gave you to gently clean your skin. If you do not have soap from your surgeon, use your regular soap. Do not shave or scrub the surgery site.  Wear clean pajamas and have clean sheets on your bed.           Follow-ups after your visit        Follow-up notes from your care team     Return if symptoms worsen or fail to improve.      Your next 10 appointments already scheduled     May 13, 2019  9:00 AM CDT   (Arrive by 8:45 AM)   PHYSICAL with Ashley Allen MD   [facility] ([facility] )    [address]  [place] [address]   [phone]              Who to contact     If you have questions or need follow up information about today's clinic visit or your schedule please contact [facility] directly at [phone].  Normal or non-critical lab and imaging results will be  "communicated to you by [employer], letter or phone within 4 business days after the clinic has received the results. If you do not hear from us within 7 days, please contact the clinic through [email or web address] or phone. If you have a critical or abnormal lab result, we will notify you by phone as soon as possible.  Submit refill requests through [email or web address] or call your pharmacy and they will forward the refill request to us. Please allow 3 business days for your refill to be completed.          Additional Information About Your Visit        [email or web address] Information     [email or web address] gives you secure access to your electronic health record. If you see a primary care provider, you can also send messages to your care team and make appointments. If you have questions, please call your primary care clinic.  If you do not have a primary care provider, please call [phone] and they will assist you.        Care EveryWhere ID     This is your Care EveryWhere ID. This could be used by other organizations to access your [place] medical records  [id number]        Your Vitals Were     Pulse Temperature Respirations Height Pulse Oximetry BMI (Body Mass Index)    97 97.9  F (36.6  C) (Tympanic) 16 5' 3\" (1.6 m) 98% 34.54 kg/m2       Blood Pressure from Last 3 Encounters:   09/24/18 130/66   05/09/18 110/80   03/12/18 112/70    Weight from Last 3 Encounters:   09/24/18 195 lb (88.5 kg)   05/09/18 190 lb (86.2 kg)   03/12/18 185 lb (83.9 kg)              We Performed the Following     CBC with platelets     HCG qualitative urine        Primary Care Provider Office Phone # Fax #    Ashley Allen [phone] [address] [facility] [address]        Equal Access to Services     [facility] CHARLEY : Kiki Linn, alice rivera, bernardo cannon. So [facility] [phone].    ATENCIÓN: Si habla español, tiene a morrow disposición servicios gratuitos de " asistencia lingüística. Raji al [phone].    We comply with applicable federal civil rights laws and [place] laws. We do not discriminate on the basis of race, color, national origin, age, disability, sex, sexual orientation, or gender identity.            Thank you!     Thank you for choosing [facility]  for your care. Our goal is always to provide you with excellent care. Hearing back from our patients is one way we can continue to improve our services. Please take a few minutes to complete the written survey that you may receive in the mail after your visit with us. Thank you!             Your Updated Medication List - Protect others around you: Learn how to safely use, store and throw away your medicines at www.disposemymeds.org.          This list is accurate as of 9/24/18  2:22 PM.  Always use your most recent med list.                   Brand Name Dispense Instructions for use Diagnosis    FORFIVO  MG Tb24   Generic drug:  BuPROPion HCl ER (XL)     30 tablet    TAKE 1 TABLET BY MOUTH DAILY    Postpartum depression       LEVOTHYROXINE SODIUM PO      Take 100 mcg by mouth        norethindrone-ethinyl estradiol 1-20 MG-MCG per tablet    MICROGESTIN FE 1/20    112 tablet    TAKE 1 TABLET BY MOUTH DAILY. TAKE ACTIVE TABLETS ONLY    Family planning       * sertraline 100 MG tablet    ZOLOFT    30 tablet    TAKE 1 TABLET BY MOUTH DAILY    Major depressive disorder, recurrent episode, moderate (H)       * sertraline 50 MG tablet    ZOLOFT    30 tablet    Take 1 tablet (50 mg) by mouth daily    Major depressive disorder, recurrent episode, moderate (H)       * Notice:  This list has 2 medication(s) that are the same as other medications prescribed for you. Read the directions carefully, and ask your doctor or other care provider to review them with you.

## 2018-09-24 NOTE — PROGRESS NOTES
[facility]  [address]  [place] [address]  [phone]  Dept: [phone]    PRE-OP EVALUATION:  Today's date: 2018    Etta Obrien (: 1980) presents for pre-operative evaluation assessment as requested by Dr. Vang.  She requires evaluation and anesthesia risk assessment prior to undergoing surgery/procedure for treatment of excess breast tissue and ongoing neck and shoulder pain.    Proposed Surgery/ Procedure: Bilateral Breast Reduction  Date of Surgery/ Procedure: 10/02/18  Time of Surgery/ Procedure: [facility]/Surgical Facility: [facility]  Primary Physician: Ashley Allen  Type of Anesthesia Anticipated: to be determined    Patient has a Health Care Directive or Living Will:  NO    1. NO - Do you have a history of heart attack, stroke, stent, bypass or surgery on an artery in the head, neck, heart or legs?  2. NO - Do you ever have any pain or discomfort in your chest?  3. NO - Do you have a history of  Heart Failure?  4. NO - Are you troubled by shortness of breath when: walking on the level, up a slight hill or at night?  5. NO - Do you currently have a cold, bronchitis or other respiratory infection?  6. NO - Do you have a cough, shortness of breath or wheezing?  7. NO - Do you sometimes get pains in the calves of your legs when you walk?  8. NO - Do you or anyone in your family have previous history of blood clots?  9. NO - Do you or does anyone in your family have a serious bleeding problem such as prolonged bleeding following surgeries or cuts?  10. NO - Have you ever had problems with anemia or been told to take iron pills?  11. NO - Have you had any abnormal blood loss such as black, tarry or bloody stools, or abnormal vaginal bleeding?  12. NO - Have you ever had a blood transfusion?  13. NO - Have you or any of your relatives ever had problems with anesthesia?  14. NO - Do you have sleep apnea, excessive snoring or daytime  drowsiness?  15. NO - Do you have any prosthetic heart valves?  16. NO - Do you have prosthetic joints?  17. YES - IS THERE ANY CHANCE THAT YOU MAY BE PREGNANT? Is in a relationship      HPI:     HPI related to upcoming procedure: Excess breast tissue with neck and shoulder pain      See problem list for active medical problems.  Problems all longstanding and stable, except as noted/documented.  See ROS for pertinent symptoms related to these conditions.                                                                                                                                                          .    MEDICAL HISTORY:     Patient Active Problem List    Diagnosis Date Noted     High risk HPV infection 2018     Priority: Medium     2018 repeat pap 12 months or at annual       Gynecomastia, female 2018     Priority: Medium     Major depressive disorder, recurrent episode, moderate (H) 2017     Priority: Medium     ACP (advance care planning) 2017     Priority: Medium     Advance Care Planning 2017: ACP Review of Chart / Resources Provided:  Reviewed chart for advance care plan.  Etta RIOS Marangle has been provided information and resources to begin or update their advance care plan.  Added by Roxann Shen              (spontaneous vaginal delivery) 10/13/2015     Priority: Medium     Leal       Bilateral carpal tunnel syndrome 2015     Priority: Medium     H/O premature delivery 2015     Priority: Medium     Plan 17 oh prog cap after 16 wks       PVC's (premature ventricular contractions) 2014     Priority: Medium     Cleared for pregnancy by cardiology       Elevated testosterone level in female 2013     Priority: Medium     Ovarian hyperthecosis 2013     Priority: Medium     History of pre-term labor 2013     Priority: Medium     Delivered at 36 wks  Diagnosis updated by automated process. Provider to review and confirm.       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       Infertility, anovulation 09/16/2013     Priority: Medium     PCOS (polycystic ovarian syndrome)      Priority: Medium     With hirsutism and anovulatory amenorrhea       Constipation, chronic      Priority: Medium     Hirsutism      Priority: Medium      Past Medical History:   Diagnosis Date     Constipation, chronic      Gestational diabetes      Hirsutism      PCOS (polycystic ovarian syndrome)      Spina bifida occulta      Unspecified hypothyroidism      Past Surgical History:   Procedure Laterality Date     dental surgical procedure       LAPAROSCOPIC TUBAL DYE STUDY N/A 9/3/2014    Procedure: LAPAROSCOPIC TUBAL DYE STUDY;  Surgeon: Amanuel Purcell MD;  Location: [facility] OR     Current Outpatient Prescriptions   Medication Sig Dispense Refill     FORFIVO  MG TB24 TAKE 1 TABLET BY MOUTH DAILY 30 tablet 2     LEVOTHYROXINE SODIUM PO Take 100 mcg by mouth       norethindrone-ethinyl estradiol (MICROGESTIN FE 1/20) 1-20 MG-MCG per tablet TAKE 1 TABLET BY MOUTH DAILY. TAKE ACTIVE TABLETS ONLY 112 tablet 4     sertraline (ZOLOFT) 100 MG tablet TAKE 1 TABLET BY MOUTH DAILY 30 tablet 4     sertraline (ZOLOFT) 50 MG tablet Take 1 tablet (50 mg) by mouth daily 30 tablet 0     OTC products: None, except as noted above    Allergies   Allergen Reactions     Macrolides Hives     Macrolide antibiotics     Penicillins Hives     Sulfonamide Derivatives Hives     Sulfa      Latex Allergy: NO    Social History   Substance Use Topics     Smoking status: Never Smoker     Smokeless tobacco: Never Used     Alcohol use No     History   Drug Use No       REVIEW OF SYSTEMS:   Constitutional, neuro, ENT, endocrine, pulmonary, cardiac, gastrointestinal, genitourinary, musculoskeletal, integument and psychiatric systems are negative, except as otherwise noted.    EXAM:   /66 (BP Location:  "Left arm, Patient Position: Sitting, Cuff Size: Adult Regular)  Pulse 97  Temp 97.9  F (36.6  C) (Tympanic)  Resp 16  Ht 5' 3\" (1.6 m)  Wt 195 lb (88.5 kg)  SpO2 98%  BMI 34.54 kg/m2    GENERAL APPEARANCE: healthy, alert and no distress     EYES: EOMI, PERRL     HENT: ear canals and TM's normal and nose and mouth without ulcers or lesions     NECK: no adenopathy     RESP: lungs clear to auscultation - no rales, rhonchi or wheezes     CV: regular rates and rhythm, normal S1 S2, no S3 or S4 and no murmur, click or rub     ABDOMEN:  soft, nontender, no HSM or masses and bowel sounds normal     SKIN: no suspicious lesions or rashes     NEURO: Normal strength and tone, sensory exam grossly normal, mentation intact and speech normal     PSYCH: mentation appears normal. and affect normal/bright    DIAGNOSTICS:     Labs Resulted Today:   Results for orders placed or performed in visit on 09/24/18   CBC with platelets   Result Value Ref Range    WBC 8.8 4.0 - 11.0 10e9/L    RBC Count 4.64 3.8 - 5.2 10e12/L    Hemoglobin 14.5 11.7 - 15.7 g/dL    Hematocrit 41.9 35.0 - 47.0 %    MCV 90 78 - 100 fl    MCH 31.3 26.5 - 33.0 pg    MCHC 34.6 31.5 - 36.5 g/dL    RDW 12.4 10.0 - 15.0 %    Platelet Count 271 150 - 450 10e9/L   HCG qualitative urine   Result Value Ref Range    HCG Qual Urine Negative NEG^Negative       Recent Labs   Lab Test 07/22/18 05/09/18   0928  01/28/16   0751  10/14/15   0550  10/13/15   2009   08/04/15   0950   02/24/14   1028   HGB   --   15.1   --   12.7  13.7   < >  12.8   < >  14.0   PLT   --   284   --    --   266   < >  228   < >  220   NA   --    --    --    --    --    --    --    --   142   POTASSIUM  3.8   --    --    --    --    --    --    --   4.3   CR  0.76   --    --    --    --    --    --    --   0.69   A1C   --    --   4.7   --    --    --   4.9   < >   --     < > = values in this interval not displayed.        IMPRESSION:   Reason for surgery/procedure: Excess breast tissue with " associated pain  Diagnosis/reason for consult: Cardiopulmonary clearance    The proposed surgical procedure is considered INTERMEDIATE risk.    REVISED CARDIAC RISK INDEX  The patient has the following serious cardiovascular risks for perioperative complications such as (MI, PE, VFib and 3  AV Block):  No serious cardiac risks  INTERPRETATION: 0 risks: Class I (very low risk - 0.4% complication rate)    The patient has the following additional risks for perioperative complications:  No identified additional risks      ICD-10-CM    1. Preop general physical exam Z01.818 CBC with platelets     HCG qualitative urine   2. Dense breast tissue R92.2    3. Chronic pain of both shoulders M25.511     G89.29     M25.512        RECOMMENDATIONS:       Cardiovascular Risk  Performs 4 METs exercise without symptoms (Light housework (dusting, washing dishes), Climb a flight of stairs and Walk on level ground at 15 minutes per mile (4 miles/hour)) .       --Patient is to take all scheduled medications on the day of surgery EXCEPT for modifications listed below.    Anticoagulant or Antiplatelet Medication Use  Hold all ASA/NSAIDs/Vitamins/Supplements 7-10 days prior to procedure         APPROVAL GIVEN to proceed with proposed procedure, without further diagnostic evaluation       Signed Electronically by: Ashley Allen MD    Copy of this evaluation report is provided to requesting physician.    [facility] Preop Guidelines    Revised Cardiac Risk Index

## 2018-09-24 NOTE — NURSING NOTE
"Chief Complaint   Patient presents with     Pre-Op Exam       Initial /66 (BP Location: Left arm, Patient Position: Sitting, Cuff Size: Adult Regular)  Pulse 97  Temp 97.9  F (36.6  C) (Tympanic)  Resp 16  Ht 5' 3\" (1.6 m)  Wt 195 lb (88.5 kg)  SpO2 98%  BMI 34.54 kg/m2 Estimated body mass index is 34.54 kg/(m^2) as calculated from the following:    Height as of this encounter: 5' 3\" (1.6 m).    Weight as of this encounter: 195 lb (88.5 kg).  Medication Reconciliation: complete    Lulu Angeles MA  "

## 2018-09-25 ASSESSMENT — ANXIETY QUESTIONNAIRES: GAD7 TOTAL SCORE: 0

## 2018-09-25 ASSESSMENT — PATIENT HEALTH QUESTIONNAIRE - PHQ9: SUM OF ALL RESPONSES TO PHQ QUESTIONS 1-9: 2

## 2018-09-28 DIAGNOSIS — F33.1 MAJOR DEPRESSIVE DISORDER, RECURRENT EPISODE, MODERATE (H): ICD-10-CM

## 2018-09-28 NOTE — TELEPHONE ENCOUNTER
SERTRALINE 50MG TABLETS    Last Written Prescription Date:  8/13/18  Last Fill Quantity: 30,   # refills: 0  Last Office Visit: 9/24/18  Future Office visit:       Routing refill request to provider for review/approval because:

## 2018-10-01 NOTE — TELEPHONE ENCOUNTER
Please advise on Sertraline 50 mg.  Please see myc medical advise on 8/13/18.  Looks like patient had a preop on 9/24/18 but I do not see the Sertraline addressed.  PCP [place].  Please advise.  Thank you.

## 2018-10-02 ENCOUNTER — TRANSFERRED RECORDS (OUTPATIENT)
Dept: HEALTH INFORMATION MANAGEMENT | Facility: CLINIC | Age: 38
End: 2018-10-02

## 2018-10-25 DIAGNOSIS — F33.1 MAJOR DEPRESSIVE DISORDER, RECURRENT EPISODE, MODERATE (H): ICD-10-CM

## 2018-11-27 RX ORDER — BUPROPION HYDROCHLORIDE 450 MG/1
TABLET, FILM COATED, EXTENDED RELEASE ORAL
Qty: 30 TABLET | Refills: 5 | Status: SHIPPED | OUTPATIENT
Start: 2018-11-27 | End: 2019-08-20

## 2018-11-27 RX ORDER — BUPROPION HYDROCHLORIDE 450 MG/1
TABLET, FILM COATED, EXTENDED RELEASE ORAL
Qty: 30 TABLET | Refills: 0 | OUTPATIENT
Start: 2018-11-27

## 2018-11-27 NOTE — TELEPHONE ENCOUNTER
Forfivo last filled on 8.24.18 #30 with 2 refills. Last office visit on 9.24.18. Pended.Thank you.

## 2019-01-16 ENCOUNTER — ALLIED HEALTH/NURSE VISIT (OUTPATIENT)
Dept: FAMILY MEDICINE | Facility: OTHER | Age: 39
End: 2019-01-16
Attending: NURSE PRACTITIONER
Payer: COMMERCIAL

## 2019-01-16 DIAGNOSIS — Z23 NEED FOR PROPHYLACTIC VACCINATION AND INOCULATION AGAINST INFLUENZA: Primary | ICD-10-CM

## 2019-01-16 PROCEDURE — 90686 IIV4 VACC NO PRSV 0.5 ML IM: CPT

## 2019-01-16 PROCEDURE — 90471 IMMUNIZATION ADMIN: CPT

## 2019-01-16 NOTE — PROGRESS NOTES

## 2019-02-18 ENCOUNTER — OFFICE VISIT (OUTPATIENT)
Dept: FAMILY MEDICINE | Facility: OTHER | Age: 39
End: 2019-02-18
Attending: FAMILY MEDICINE
Payer: COMMERCIAL

## 2019-02-18 VITALS
HEIGHT: 63 IN | SYSTOLIC BLOOD PRESSURE: 102 MMHG | DIASTOLIC BLOOD PRESSURE: 62 MMHG | BODY MASS INDEX: 33.13 KG/M2 | HEART RATE: 106 BPM | OXYGEN SATURATION: 97 % | RESPIRATION RATE: 14 BRPM | WEIGHT: 187 LBS | TEMPERATURE: 98.3 F

## 2019-02-18 DIAGNOSIS — J01.00 SUBACUTE MAXILLARY SINUSITIS: Primary | ICD-10-CM

## 2019-02-18 DIAGNOSIS — J20.9 ACUTE BRONCHITIS, UNSPECIFIED ORGANISM: ICD-10-CM

## 2019-02-18 PROCEDURE — 99213 OFFICE O/P EST LOW 20 MIN: CPT | Performed by: NURSE PRACTITIONER

## 2019-02-18 RX ORDER — ALBUTEROL SULFATE 90 UG/1
2 AEROSOL, METERED RESPIRATORY (INHALATION) EVERY 6 HOURS PRN
Qty: 1 INHALER | Refills: 0 | Status: SHIPPED | OUTPATIENT
Start: 2019-02-18 | End: 2019-03-18

## 2019-02-18 RX ORDER — CEPHALEXIN 500 MG/1
500 CAPSULE ORAL 3 TIMES DAILY
Qty: 30 CAPSULE | Refills: 0 | Status: SHIPPED | OUTPATIENT
Start: 2019-02-18 | End: 2019-05-13

## 2019-02-18 ASSESSMENT — MIFFLIN-ST. JEOR: SCORE: 1507.36

## 2019-02-18 ASSESSMENT — PAIN SCALES - GENERAL: PAINLEVEL: SEVERE PAIN (7)

## 2019-02-18 NOTE — NURSING NOTE
"No chief complaint on file.      Initial /62 (BP Location: Left arm, Patient Position: Sitting, Cuff Size: Adult Regular)   Pulse 106   Temp 98.3  F (36.8  C) (Tympanic)   Resp 14   Ht 1.6 m (5' 3\")   Wt 84.8 kg (187 lb)   SpO2 97%   BMI 33.13 kg/m   Estimated body mass index is 33.13 kg/m  as calculated from the following:    Height as of this encounter: 1.6 m (5' 3\").    Weight as of this encounter: 84.8 kg (187 lb).  Medication Reconciliation: complete    Barb Matson LPN      "

## 2019-02-18 NOTE — PATIENT INSTRUCTIONS
ASSESSMENT/PLAN:     1. Subacute maxillary sinusitis  - cephALEXin (KEFLEX) 500 MG capsule; Take 1 capsule (500 mg) by mouth 3 times daily for 10 days  Dispense: 30 capsule; Refill: 0    2. Acute bronchitis, unspecified organism  - cephALEXin (KEFLEX) 500 MG capsule; Take 1 capsule (500 mg) by mouth 3 times daily for 10 days  Dispense: 30 capsule; Refill: 0  - albuterol (PROAIR HFA/PROVENTIL HFA/VENTOLIN HFA) 108 (90 Base) MCG/ACT inhaler; Inhale 2 puffs into the lungs every 6 hours as needed for shortness of breath / dyspnea or wheezing  Dispense: 1 Inhaler; Refill: 0      Fluids  Rest  Temp control  Humidity at home (add bacteriostatic solution to humidifier)  Mucinex liquid OTC PRN  Please return in you do not improve  To UC or ER with persistent, worsening, or worrisome symptoms            Yue Gee NP  [facility]

## 2019-02-18 NOTE — PROGRESS NOTES
SUBJECTIVE:   Etta Obrien is a 38 year old female who presents to clinic today for the following health issues:      RESPIRATORY SYMPTOMS    Duration: Friday    Description  facial pain/pressure, cough, fever and headache    Severity: moderate    Accompanying signs and symptoms: None    History (predisposing factors):  none    Precipitating or alleviating factors: None    Therapies tried and outcome:  acetaminophen OTC NSAID, don't touch the pain    Headache from sinus pressure  Teeth are sensitive        Problem list and histories reviewed & adjusted, as indicated.  Additional history: as documented    Patient Active Problem List   Diagnosis     PCOS (polycystic ovarian syndrome)     Constipation, chronic     Hirsutism     Elevated testosterone level in female     Ovarian hyperthecosis     History of pre-term labor     Hypothyroidism     Infertility, anovulation     PVC's (premature ventricular contractions)     H/O premature delivery     Bilateral carpal tunnel syndrome      (spontaneous vaginal delivery)     ACP (advance care planning)     Major depressive disorder, recurrent episode, moderate (H)     Gynecomastia, female     High risk HPV infection     Past Surgical History:   Procedure Laterality Date     dental surgical procedure       LAPAROSCOPIC TUBAL DYE STUDY N/A 9/3/2014    Procedure: LAPAROSCOPIC TUBAL DYE STUDY;  Surgeon: Amanuel Purcell MD;  Location: [facility] OR       Social History     Tobacco Use     Smoking status: Never Smoker     Smokeless tobacco: Never Used   Substance Use Topics     Alcohol use: No     Family History   Problem Relation Age of Onset     Lipids Father         hyperlipidemia     Hypertension Father      Lipids Mother         hyperlipidemia     Lupus Mother         erythematosus     Diabetes Other      Thyroid Disease Other      Asthma No family hx of          Current Outpatient Medications   Medication Sig Dispense Refill     FORFIVO  MG TB24 TAKE 1 TABLET BY MOUTH DAILY  "30 tablet 5     levothyroxine (SYNTHROID/LEVOTHROID) 100 MCG tablet Take 1 tablet (100 mcg) by mouth daily 90 tablet 1     norethindrone-ethinyl estradiol (MICROGESTIN FE 1/20) 1-20 MG-MCG per tablet TAKE 1 TABLET BY MOUTH DAILY. TAKE ACTIVE TABLETS ONLY 112 tablet 4     sertraline (ZOLOFT) 50 MG tablet TAKE 1 TABLET(50 MG) BY MOUTH DAILY 30 tablet 5     Allergies   Allergen Reactions     Macrolides Hives     Macrolide antibiotics     Penicillins Hives     Sulfonamide Derivatives Hives     Sulfa     Recent Labs   Lab Test 07/22/18 05/09/18  0928 01/28/16  0751 10/13/15  2009 08/04/15  0950 05/15/15  0820 04/06/15  0955 02/24/14  1028   A1C  --   --  4.7  --  4.9 5.0  --   --    LDL  --  126* 107*  --   --   --   --   --    HDL  --  52 39*  --   --   --   --   --    TRIG  --  207* 110  --   --   --   --   --    ALT 12  --   --   --   --   --   --   --    CR 0.76  --   --   --   --   --   --  0.69   GFRESTIMATED  --   --   --   --   --   --   --  >90   GFRESTBLACK  --   --   --   --   --   --   --  >90   POTASSIUM 3.8  --   --   --   --   --   --  4.3   TSH  --   --   --  2.81  --   --  1.36 1.36      BP Readings from Last 3 Encounters:   02/18/19 102/62   09/24/18 130/66   05/09/18 110/80    Wt Readings from Last 3 Encounters:   02/18/19 84.8 kg (187 lb)   09/24/18 88.5 kg (195 lb)   05/09/18 86.2 kg (190 lb)                  Labs reviewed in EPIC    Reviewed and updated as needed this visit by clinical staff  Tobacco  Allergies  Meds       Reviewed and updated as needed this visit by Provider         ROS:  Constitutional, HEENT, cardiovascular, pulmonary, gi and gu systems are negative, except as otherwise noted.    OBJECTIVE:     /62 (BP Location: Left arm, Patient Position: Sitting, Cuff Size: Adult Regular)   Pulse 106   Temp 98.3  F (36.8  C) (Tympanic)   Resp 14   Ht 1.6 m (5' 3\")   Wt 84.8 kg (187 lb)   SpO2 97%   BMI 33.13 kg/m    Body mass index is 33.13 kg/m .     GENERAL: healthy, alert and " no distress  EYES: Eyes grossly normal to inspection, PERRL and conjunctivae and sclerae normal  HENT: rhinorrhea yellow and sinuses: maxillary, frontal tenderness on both sides, PND  RESP: Cough - loose  CV: regular rate and rhythm, normal S1 S2, no S3 or S4, no murmur, click or rub, no peripheral edema and peripheral pulses strong        ASSESSMENT/PLAN:     1. Subacute maxillary sinusitis  - cephALEXin (KEFLEX) 500 MG capsule; Take 1 capsule (500 mg) by mouth 3 times daily for 10 days  Dispense: 30 capsule; Refill: 0    2. Acute bronchitis, unspecified organism  - cephALEXin (KEFLEX) 500 MG capsule; Take 1 capsule (500 mg) by mouth 3 times daily for 10 days  Dispense: 30 capsule; Refill: 0  - albuterol (PROAIR HFA/PROVENTIL HFA/VENTOLIN HFA) 108 (90 Base) MCG/ACT inhaler; Inhale 2 puffs into the lungs every 6 hours as needed for shortness of breath / dyspnea or wheezing  Dispense: 1 Inhaler; Refill: 0      Fluids  Rest  Temp control  Humidity at home (add bacteriostatic solution to humidifier)  Mucinex liquid OTC PRN  Please return in you do not improve  To UC or ER with persistent, worsening, or worrisome symptoms            Yue Gee NP  [facility]

## 2019-03-18 DIAGNOSIS — J20.9 ACUTE BRONCHITIS, UNSPECIFIED ORGANISM: ICD-10-CM

## 2019-03-18 NOTE — TELEPHONE ENCOUNTER
Ventolin      Last Written Prescription Date:  2-18-19  Last Fill Quantity: 1,   # refills: 0  Last Office Visit: 2-18-19  Future Office visit:    Next 5 appointments (look out 90 days)    May 13, 2019  9:00 AM CDT  (Arrive by 8:45 AM)  PHYSICAL with Ashley Allen MD  [facility] ([facility] ) [address]  [facility] [address]  [phone]

## 2019-03-19 RX ORDER — ALBUTEROL SULFATE 90 UG/1
AEROSOL, METERED RESPIRATORY (INHALATION)
Qty: 18 G | Refills: 1 | Status: SHIPPED | OUTPATIENT
Start: 2019-03-19 | End: 2020-09-01

## 2019-05-08 NOTE — PROGRESS NOTES
SUBJECTIVE:   CC: Etta Obrien is an 38 year old woman who presents for preventive health visit.     Healthy Habits:    Do you get at least three servings of calcium containing foods daily (dairy, green leafy vegetables, etc.)? yes    Amount of exercise or daily activities, outside of work: 2 day(s) per week    Problems taking medications regularly No    Medication side effects: No    Have you had an eye exam in the past two years? yes    Do you see a dentist twice per year? yes    Do you have sleep apnea, excessive snoring or daytime drowsiness?yes-daytime drowsiness      Depression and Anxiety Follow-Up    Status since last visit: Worsened - patient is experiencing more fatigue and difficulty losing weight    Other associated symptoms:None    Complicating factors:     Significant life event: No     Current substance abuse: None    PHQ 5/9/2018 9/24/2018 5/13/2019   PHQ-9 Total Score 0 2 7   Q9: Thoughts of better off dead/self-harm past 2 weeks Not at all Not at all Not at all     ARNALDO-7 SCORE 5/9/2018 9/24/2018 5/13/2019   Total Score 1 0 5       PHQ-9  English  PHQ-9   Any Language  ARNALDO-7  Suicide Assessment Five-step Evaluation and Treatment (SAFE-T)      Hypothyroidism Follow-up      Since last visit, patient describes the following symptoms: weight gain, fatigue      Abdominal Pain      Duration: worse again over the past few weeks    Description (location/character/radiation): RUQ discomfort, worse with eating       Associated flank pain: None    Intensity:  mild, moderate    Accompanying signs and symptoms:        Fever/Chills: no        Gas/Bloating: no        Nausea/vomitting: no        Diarrhea: no        Dysuria or Hematuria: no     History (previous similar pain/trauma/previous testing): recent hospitalization for diverticulitis, this pain is similar to that pain    Precipitating or alleviating factors:       Pain worse with eating/BM/urination: worse with eating       Pain relieved by BM: no      Therapies tried and outcome: None    LMP:  not applicable           Today's PHQ-2 Score:   PHQ-2 (  [employer]) 2013   Q1: Little interest or pleasure in doing things 0   Q2: Feeling down, depressed or hopeless 0   PHQ-2 Score 0       Abuse: Current or Past(Physical, Sexual or Emotional)- No  Do you feel safe in your environment? Yes    Social History     Tobacco Use     Smoking status: Never Smoker     Smokeless tobacco: Never Used   Substance Use Topics     Alcohol use: No     If you drink alcohol do you typically have >3 drinks per day or >7 drinks per week? No                     Reviewed orders with patient.  Reviewed health maintenance and updated orders accordingly - Yes  Patient Active Problem List   Diagnosis     PCOS (polycystic ovarian syndrome)     Constipation, chronic     Hirsutism     Elevated testosterone level in female     Ovarian hyperthecosis     History of pre-term labor     Hypothyroidism     Infertility, anovulation     PVC's (premature ventricular contractions)     H/O premature delivery     Bilateral carpal tunnel syndrome      (spontaneous vaginal delivery)     ACP (advance care planning)     Major depressive disorder, recurrent episode, moderate (H)     Gynecomastia, female     High risk HPV infection     Past Surgical History:   Procedure Laterality Date     dental surgical procedure       LAPAROSCOPIC TUBAL DYE STUDY N/A 9/3/2014    Procedure: LAPAROSCOPIC TUBAL DYE STUDY;  Surgeon: Amanuel Purcell MD;  Location: [facility]     MAMMOPLASTY REDUCTION  10/19       Social History     Tobacco Use     Smoking status: Never Smoker     Smokeless tobacco: Never Used   Substance Use Topics     Alcohol use: No     Family History   Problem Relation Age of Onset     Lipids Father         hyperlipidemia     Hypertension Father      Lipids Mother         hyperlipidemia     Lupus Mother         erythematosus     Diabetes Other      Thyroid Disease Other      Asthma No family hx of           Current Outpatient Medications   Medication Sig Dispense Refill     FORFIVO  MG TB24 TAKE 1 TABLET BY MOUTH DAILY 30 tablet 5     levothyroxine (SYNTHROID/LEVOTHROID) 100 MCG tablet Take 1 tablet (100 mcg) by mouth daily 90 tablet 1     sertraline (ZOLOFT) 100 MG tablet Take 1 tablet (100 mg) by mouth daily 30 tablet 2     VENTOLIN  (90 Base) MCG/ACT inhaler INHALE 2 PUFFS INTO THE LUNGS EVERY 6 HOURS AS NEEDED FOR SHORTNESS OF BREATH OR DIFFICULT BREATHING OR WHEEZING 18 g 1     norethindrone-ethinyl estradiol (JUNEL FE 1/20) 1-20 MG-MCG tablet Take 1 tablet by mouth daily 84 tablet 3     Allergies   Allergen Reactions     Macrolides Hives     Macrolide antibiotics     Penicillins Hives     Sulfonamide Derivatives Hives     Sulfa       Mammogram not appropriate for this patient based on age.    Pertinent mammograms are reviewed under the imaging tab.  History of abnormal Pap smear: NO - age 30-65 PAP every 5 years with negative HPV co-testing recommended  PAP / HPV Latest Ref Rng & Units 5/9/2018 1/25/2017 1/20/2016   PAP - NIL NIL NIL   HPV 16 DNA NEG:Negative Negative Negative Negative   HPV 18 DNA NEG:Negative Negative Negative Negative   OTHER HR HPV NEG:Negative Positive(A) Negative Negative   HPV HIGH RISK DNA PROBE - - - -     Reviewed and updated as needed this visit by clinical staff  Tobacco  Meds  Med Hx  Surg Hx  Fam Hx  Soc Hx        Reviewed and updated as needed this visit by Provider            ROS:  CONSTITUTIONAL: NEGATIVE for fever, chills, change in weight  INTEGUMENTARU/SKIN: NEGATIVE for worrisome rashes, moles or lesions  EYES: NEGATIVE for vision changes or irritation  ENT: NEGATIVE for ear, mouth and throat problems  RESP: NEGATIVE for significant cough or SOB  BREAST: NEGATIVE for masses, tenderness or discharge  CV: NEGATIVE for chest pain, palpitations or peripheral edema  GI: NEGATIVE for nausea, abdominal pain, heartburn, or change in bowel habits  : NEGATIVE  "for unusual urinary or vaginal symptoms. Periods are regular.  MUSCULOSKELETAL: NEGATIVE for significant arthralgias or myalgia  NEURO: NEGATIVE for weakness, dizziness or paresthesias  PSYCHIATRIC: NEGATIVE for changes in mood or affect    OBJECTIVE:   /74 (BP Location: Right arm, Patient Position: Sitting, Cuff Size: Adult Regular)   Pulse 89   Temp 98  F (36.7  C) (Tympanic)   Resp 16   Ht 1.6 m (5' 3\")   Wt 89.3 kg (196 lb 14.4 oz)   SpO2 98%   BMI 34.88 kg/m    EXAM:  GENERAL: healthy, alert and no distress  EYES: Eyes grossly normal to inspection, PERRL and conjunctivae and sclerae normal  HENT: ear canals and TM's normal, nose and mouth without ulcers or lesions  NECK: no adenopathy, no asymmetry, masses, or scars and thyroid normal to palpation  RESP: lungs clear to auscultation - no rales, rhonchi or wheezes  BREAST: deferred to Plastic Surgery  CV: regular rate and rhythm, normal S1 S2, no S3 or S4, no murmur, click or rub, no peripheral edema and peripheral pulses strong  ABDOMEN: soft, nontender, no hepatosplenomegaly, no masses and bowel sounds normal   (female): normal female external genitalia, normal urethral meatus, vaginal mucosa pink, moist, well rugated, and normal cervix  MS: no gross musculoskeletal defects noted, no edema  SKIN: no suspicious lesions or rashes  NEURO: Normal strength and tone, mentation intact and speech normal  PSYCH: mentation appears normal, affect normal/bright    Diagnostic Test Results:  none     ASSESSMENT/PLAN:       ICD-10-CM    1. Routine general medical examination at a health care facility Z00.00 A pap thin layer screen with  HPV - recommended age 30 - 65 years (select HPV order below)     HPV High Risk Types DNA Cervical     Lipid Profile     T4 free     T4 free   2. Encounter for gynecological examination without abnormal finding Z01.419    3. Major depressive disorder, recurrent episode, moderate (H) F33.1 sertraline (ZOLOFT) 100 MG tablet   4. " "Hypothyroidism, unspecified type E03.9 TSH with free T4 reflex   5. H/O diverticulitis of colon Z87.19    6. RUQ abdominal pain R10.11 CT Abdomen Pelvis w Contrast       COUNSELING:   Reviewed preventive health counseling, as reflected in patient instructions    BP Readings from Last 1 Encounters:   05/13/19 124/74     Estimated body mass index is 34.88 kg/m  as calculated from the following:    Height as of this encounter: 1.6 m (5' 3\").    Weight as of this encounter: 89.3 kg (196 lb 14.4 oz).           reports that she has never smoked. She has never used smokeless tobacco.      Counseling Resources:  ATP IV Guidelines  Pooled Cohorts Equation Calculator  Breast Cancer Risk Calculator  FRAX Risk Assessment  ICSI Preventive Guidelines  Dietary Guidelines for Americans, 2010  USDA's MyPlate  ASA Prophylaxis  Lung CA Screening    Ashley Allen MD  [facility] [place] [facility]  "

## 2019-05-13 ENCOUNTER — OFFICE VISIT (OUTPATIENT)
Dept: FAMILY MEDICINE | Facility: OTHER | Age: 39
End: 2019-05-13
Attending: FAMILY MEDICINE
Payer: COMMERCIAL

## 2019-05-13 VITALS
DIASTOLIC BLOOD PRESSURE: 74 MMHG | OXYGEN SATURATION: 98 % | WEIGHT: 196.9 LBS | BODY MASS INDEX: 34.89 KG/M2 | HEIGHT: 63 IN | RESPIRATION RATE: 16 BRPM | SYSTOLIC BLOOD PRESSURE: 124 MMHG | HEART RATE: 89 BPM | TEMPERATURE: 98 F

## 2019-05-13 DIAGNOSIS — F33.1 MAJOR DEPRESSIVE DISORDER, RECURRENT EPISODE, MODERATE (H): ICD-10-CM

## 2019-05-13 DIAGNOSIS — Z87.19 H/O DIVERTICULITIS OF COLON: ICD-10-CM

## 2019-05-13 DIAGNOSIS — Z00.00 ROUTINE GENERAL MEDICAL EXAMINATION AT A HEALTH CARE FACILITY: Primary | ICD-10-CM

## 2019-05-13 DIAGNOSIS — Z01.419 ENCOUNTER FOR GYNECOLOGICAL EXAMINATION WITHOUT ABNORMAL FINDING: ICD-10-CM

## 2019-05-13 DIAGNOSIS — E03.9 HYPOTHYROIDISM, UNSPECIFIED TYPE: ICD-10-CM

## 2019-05-13 DIAGNOSIS — Z30.09 FAMILY PLANNING: Primary | ICD-10-CM

## 2019-05-13 DIAGNOSIS — R10.11 RUQ ABDOMINAL PAIN: ICD-10-CM

## 2019-05-13 LAB
CHOLEST SERPL-MCNC: 202 MG/DL
HDLC SERPL-MCNC: 43 MG/DL
LDLC SERPL CALC-MCNC: 107 MG/DL
NONHDLC SERPL-MCNC: 159 MG/DL
T4 FREE SERPL-MCNC: 0.98 NG/DL (ref 0.76–1.46)
TRIGL SERPL-MCNC: 262 MG/DL
TSH SERPL DL<=0.005 MIU/L-ACNC: 0.18 MU/L (ref 0.4–4)

## 2019-05-13 PROCEDURE — 99395 PREV VISIT EST AGE 18-39: CPT | Performed by: FAMILY MEDICINE

## 2019-05-13 PROCEDURE — 36415 COLL VENOUS BLD VENIPUNCTURE: CPT | Performed by: FAMILY MEDICINE

## 2019-05-13 PROCEDURE — 99212 OFFICE O/P EST SF 10 MIN: CPT | Mod: 25 | Performed by: FAMILY MEDICINE

## 2019-05-13 PROCEDURE — 87624 HPV HI-RISK TYP POOLED RSLT: CPT | Mod: 90 | Performed by: FAMILY MEDICINE

## 2019-05-13 PROCEDURE — 84439 ASSAY OF FREE THYROXINE: CPT | Performed by: FAMILY MEDICINE

## 2019-05-13 PROCEDURE — G0123 SCREEN CERV/VAG THIN LAYER: HCPCS | Performed by: FAMILY MEDICINE

## 2019-05-13 PROCEDURE — 99000 SPECIMEN HANDLING OFFICE-LAB: CPT | Performed by: FAMILY MEDICINE

## 2019-05-13 PROCEDURE — 84443 ASSAY THYROID STIM HORMONE: CPT | Performed by: FAMILY MEDICINE

## 2019-05-13 PROCEDURE — 80061 LIPID PANEL: CPT | Performed by: FAMILY MEDICINE

## 2019-05-13 RX ORDER — NORETHINDRONE ACETATE AND ETHINYL ESTRADIOL 1MG-20(21)
1 KIT ORAL DAILY
Qty: 84 TABLET | Refills: 3 | Status: SHIPPED | OUTPATIENT
Start: 2019-05-13 | End: 2020-01-13

## 2019-05-13 RX ORDER — SERTRALINE HYDROCHLORIDE 100 MG/1
100 TABLET, FILM COATED ORAL DAILY
Qty: 30 TABLET | Refills: 2 | Status: SHIPPED | OUTPATIENT
Start: 2019-05-13 | End: 2019-08-20

## 2019-05-13 ASSESSMENT — PAIN SCALES - GENERAL: PAINLEVEL: NO PAIN (0)

## 2019-05-13 ASSESSMENT — ANXIETY QUESTIONNAIRES
5. BEING SO RESTLESS THAT IT IS HARD TO SIT STILL: NOT AT ALL
3. WORRYING TOO MUCH ABOUT DIFFERENT THINGS: SEVERAL DAYS
1. FEELING NERVOUS, ANXIOUS, OR ON EDGE: SEVERAL DAYS
6. BECOMING EASILY ANNOYED OR IRRITABLE: SEVERAL DAYS
GAD7 TOTAL SCORE: 5
2. NOT BEING ABLE TO STOP OR CONTROL WORRYING: SEVERAL DAYS
7. FEELING AFRAID AS IF SOMETHING AWFUL MIGHT HAPPEN: NOT AT ALL

## 2019-05-13 ASSESSMENT — MIFFLIN-ST. JEOR: SCORE: 1552.26

## 2019-05-13 ASSESSMENT — PATIENT HEALTH QUESTIONNAIRE - PHQ9
5. POOR APPETITE OR OVEREATING: SEVERAL DAYS
SUM OF ALL RESPONSES TO PHQ QUESTIONS 1-9: 7

## 2019-05-13 NOTE — TELEPHONE ENCOUNTER
Pharmacy request for Junel FE.  Last refill 5/9/18, Qty 112, 4 refills by Dr. Leal.  Medication is pended if you approve.  Patient had an office visit today with you.  Thank you.    Chantelle Peralta RN

## 2019-05-13 NOTE — NURSING NOTE
"Chief Complaint   Patient presents with     Physical       Initial /74 (BP Location: Right arm, Patient Position: Sitting, Cuff Size: Adult Regular)   Pulse 89   Temp 98  F (36.7  C) (Tympanic)   Resp 16   Ht 1.6 m (5' 3\")   Wt 89.3 kg (196 lb 14.4 oz)   SpO2 98%   BMI 34.88 kg/m   Estimated body mass index is 34.88 kg/m  as calculated from the following:    Height as of this encounter: 1.6 m (5' 3\").    Weight as of this encounter: 89.3 kg (196 lb 14.4 oz).  Medication Reconciliation: complete    Lulu [place] MA  "

## 2019-05-14 ENCOUNTER — MYC MEDICAL ADVICE (OUTPATIENT)
Dept: FAMILY MEDICINE | Facility: OTHER | Age: 39
End: 2019-05-14

## 2019-05-14 DIAGNOSIS — E03.9 HYPOTHYROIDISM, UNSPECIFIED TYPE: Primary | ICD-10-CM

## 2019-05-14 ASSESSMENT — ANXIETY QUESTIONNAIRES: GAD7 TOTAL SCORE: 5

## 2019-05-15 RX ORDER — LEVOTHYROXINE SODIUM 100 MCG
100 TABLET ORAL DAILY
Qty: 30 TABLET | Refills: 5 | Status: SHIPPED | OUTPATIENT
Start: 2019-05-15 | End: 2020-01-09

## 2019-05-20 LAB
COPATH REPORT: ABNORMAL
PAP: ABNORMAL

## 2019-05-23 ENCOUNTER — TELEPHONE (OUTPATIENT)
Dept: FAMILY MEDICINE | Facility: OTHER | Age: 39
End: 2019-05-23

## 2019-05-23 ENCOUNTER — MYC MEDICAL ADVICE (OUTPATIENT)
Dept: FAMILY MEDICINE | Facility: OTHER | Age: 39
End: 2019-05-23

## 2019-05-24 DIAGNOSIS — R87.612 PAPANICOLAOU SMEAR OF CERVIX WITH LOW GRADE SQUAMOUS INTRAEPITHELIAL LESION (LGSIL): Primary | ICD-10-CM

## 2019-05-24 DIAGNOSIS — B97.7 HIGH RISK HPV INFECTION: ICD-10-CM

## 2019-05-28 DIAGNOSIS — F33.1 MAJOR DEPRESSIVE DISORDER, RECURRENT EPISODE, MODERATE (H): ICD-10-CM

## 2019-05-29 ENCOUNTER — HOSPITAL ENCOUNTER (OUTPATIENT)
Dept: CT IMAGING | Facility: HOSPITAL | Age: 39
Discharge: HOME | End: 2019-05-29
Attending: FAMILY MEDICINE | Admitting: FAMILY MEDICINE
Payer: COMMERCIAL

## 2019-05-29 DIAGNOSIS — R10.11 RUQ ABDOMINAL PAIN: ICD-10-CM

## 2019-05-29 PROCEDURE — 25500064 ZZH RX 255 OP 636: Performed by: RADIOLOGY

## 2019-05-29 PROCEDURE — 74177 CT ABD & PELVIS W/CONTRAST: CPT | Mod: TC

## 2019-05-29 RX ORDER — IOPAMIDOL 612 MG/ML
100 INJECTION, SOLUTION INTRAVASCULAR ONCE
Status: COMPLETED | OUTPATIENT
Start: 2019-05-29 | End: 2019-05-29

## 2019-05-29 RX ADMIN — IOPAMIDOL 100 ML: 612 INJECTION, SOLUTION INTRAVENOUS at 10:55

## 2019-05-29 RX ADMIN — DIATRIZOATE MEGLUMINE AND DIATRIZOATE SODIUM 30 ML: 660; 100 SOLUTION ORAL; RECTAL at 10:56

## 2019-05-30 NOTE — TELEPHONE ENCOUNTER
zoloft  Last Written Prescription Date: 5/13/18  Last Fill Quantity: 30 # of Refills: 2  Last Office Visit: 5/13/19

## 2019-06-05 ENCOUNTER — MYC MEDICAL ADVICE (OUTPATIENT)
Dept: FAMILY MEDICINE | Facility: OTHER | Age: 39
End: 2019-06-05

## 2019-06-06 ENCOUNTER — OFFICE VISIT (OUTPATIENT)
Dept: OBGYN | Facility: OTHER | Age: 39
End: 2019-06-06
Attending: OBSTETRICS & GYNECOLOGY
Payer: COMMERCIAL

## 2019-06-06 VITALS
DIASTOLIC BLOOD PRESSURE: 62 MMHG | BODY MASS INDEX: 33.66 KG/M2 | WEIGHT: 190 LBS | SYSTOLIC BLOOD PRESSURE: 112 MMHG | HEART RATE: 72 BPM | HEIGHT: 63 IN

## 2019-06-06 DIAGNOSIS — B97.7 HIGH RISK HPV INFECTION: ICD-10-CM

## 2019-06-06 DIAGNOSIS — Z53.9 ERRONEOUS ENCOUNTER--DISREGARD: Primary | ICD-10-CM

## 2019-06-06 DIAGNOSIS — R87.612 PAPANICOLAOU SMEAR OF CERVIX WITH LOW GRADE SQUAMOUS INTRAEPITHELIAL LESION (LGSIL): ICD-10-CM

## 2019-06-06 ASSESSMENT — MIFFLIN-ST. JEOR: SCORE: 1520.96

## 2019-06-06 ASSESSMENT — PAIN SCALES - GENERAL: PAINLEVEL: NO PAIN (0)

## 2019-06-06 NOTE — TELEPHONE ENCOUNTER
Spoke with pharmacy and they did not run the prior auth as requested.  Covered after it was run.  Pt notified to  at pharmacy.

## 2019-06-06 NOTE — NURSING NOTE
"Chief Complaint   Patient presents with     Consult     Consult from Dr Allen for abnormal pap       Initial /62 (BP Location: Right arm, Patient Position: Sitting, Cuff Size: Adult Large)   Pulse 72   Ht 1.6 m (5' 3\")   Wt 86.2 kg (190 lb)   BMI 33.66 kg/m   Estimated body mass index is 33.66 kg/m  as calculated from the following:    Height as of this encounter: 1.6 m (5' 3\").    Weight as of this encounter: 86.2 kg (190 lb).  Medication Reconciliation: complete    NAI ALCANTAR LPN    "

## 2019-06-06 NOTE — PATIENT INSTRUCTIONS
Patient Education     When You Have an Abnormal Pap Test    The Pap test is a screening test that checks for cell changes in the cervix, the opening of the uterus. In some cases, it checks for a virus that can cause cervical cancer. If your Pap results were abnormal, you may be worried. But there is no reason to panic. An abnormal Pap test result can mean many things. It may be due to changes (inflammation) caused by normal cell repair or infection. Or you may have a problem called dysplasia that could become cervical cancer. If so, know that dysplasia tends to progress very slowly before becoming cervical cancer. That s why it s so important to have Pap tests as often as directed. Pap tests can show cell changes in the cervix early, when treatment is most effective.  Talk with your healthcare provider  Be sure to discuss your results with your healthcare provider. Find out about any follow-up tests you ll need. You may be asked to come back for a second Pap test in a year, giving your cervix time to repair itself. Or you may be scheduled for a colposcopy exam that allows your healthcare provider to get a closer look at your cervix. Your healthcare provider may also take a biopsy of your cervix, which is sent to a lab for further testing. In either case, be sure to keep your follow-up visits. They are one of your best safeguards against future problems.  Understanding your risk  Some lifestyle choices can increase your risk of abnormal cell changes. Did you start having sex at a young age? Have you had many sexual partners? Have you had sex without using a latex condom? Do you smoke? If you answered yes to any of these questions, you are more at risk. One of the most common reasons for an abnormal Pap result is infection with the human papillomavirus (HPV). If your Pap results suggest HPV, further testing may be needed.     The Pap test  During the test:    An instrument called a speculum is inserted into the  vagina to hold it open. This lets your healthcare provider see the cervix.    A small brush, spatula, or swab is used to take cells from several areas of the cervix. The cells are put into a liquid or on a slide. They are then sent to a lab where they are studied for changes. Your healthcare provider will contact you with the results.   Date Last Reviewed: 8/1/2017 2000-2018 The "[employer]. [address] [place] [address], [place] [address]. All rights reserved. This information is not intended as a substitute for professional medical care. Always follow your healthcare professional's instructions.           Patient Education     Colposcopy  Colposcopy is a procedure that gives your healthcare provider a magnified view of your cervix. It is done using a lighted microscope called a colposcope. In most cases, a sample of cervical cells is taken during a biopsy. The sample can then be studied in a lab. If any problems are found, you and your healthcare provider will discuss treatment options. It usually takes less than 30 minutes, and you can often go back to your normal routine right away.  Reasons for the procedure  Colposcopy is usually done as a follow-up exam to help find the cause of an abnormal Pap test. Results of an abnormal Pap test can mean that the cells don t appear normal or that there are cancer cells. Abnormal cells can also be caused by infections. HPV (human papillomavirus) is a large family of viruses that can be passed from person to person through sex. HPV can cause genital warts. It can also cause changes in cervical cells. If an HPV test is positive and the Pap test is abnormal, a colposcopy may be recommended. Colposcopy is also used to evaluate other problems. These include pain or bleeding during sex, or a sore (lesion) on the vulva or vagina.  What are the risks?  Problems after colposcopy are very rare, but can include:    Bleeding (if a biopsy is done)    Infection  Getting ready for  the procedure  Colposcopy is normally done in your healthcare provider s office. It will be scheduled for a time when you re not having your menstrual period. You may be asked to sign a form giving your consent to have the procedure. A day or 2 before the procedure, your healthcare provider may also ask you to:    Not have sex    Stop using tampons    Not use creams or other vaginal medicines    Not clean out the vagina with water or other fluids (douche)    Take over-the-counter pain medicines an hour or 2 before the procedure  During colposcopy    You will be asked to lie on an exam table with your knees bent, just as you do for a Pap test.    A tool called a speculum is inserted into the vagina to hold it open.    A vinegar solution is applied to the cervix to make the abnormal cells easier to see. You may feel pressure or a slight burning for a few moments. In some cases, the cervix may be numbed first with an anesthetic.    The cervix is looked at through the colposcope, which is placed outside the vagina.    If your healthcare provider sees abnormal areas on your cervix, a biopsy will be done. The tissue sample is sent to a lab for study.    An endocervical curettage may also be done at the time of colposcopy. In this procedure, a tool is put into the endocervical canal to get a sample of cells from the endocervix. This area can't be seen with a colposcope.     You may feel slight pinching or cramping during the biopsy. Medicine may be applied to the biopsy site to stop bleeding.  After the procedure    If you feel lightheaded or dizzy, you can rest on the table until you re ready to get dressed.    If a biopsy was done, you may have mild cramping or light bleeding for a few days. You may also have discharge from the medicine used to stop bleeding at the biopsy site.    Use pads, not tampons, for at least the first 24 hours.    If you have any discomfort, over-the-counter pain medicine can provide  relief.    Ask your healthcare provider when you can have sex again.  Follow-up  If a biopsy was done, your healthcare provider will get the lab report in a week or 2. You and your healthcare provider can then discuss the results. In some cases, you may be scheduled for more tests or treatment. Be sure to keep follow-up appointments with your healthcare provider.  When to call your healthcare provider  Call your healthcare provider if you have:    Heavy vaginal bleeding (more than a pad an hour for 2 hours)    Severe or increasing pelvic pain    A fever over 100.4 F (38 C), or as directed by your provider    Bad-smelling or unusual vaginal discharge   Date Last Reviewed: 6/1/2017 2000-2018 [employer]. [address] [place] [address], [place] [address]. All rights reserved. This information is not intended as a substitute for professional medical care. Always follow your healthcare professional's instructions.

## 2019-06-07 NOTE — PROGRESS NOTES
Called away for imminent delivery.  Pt needed to be rescheduled after being checked in.  No charge.

## 2019-06-20 ENCOUNTER — OFFICE VISIT (OUTPATIENT)
Dept: OBGYN | Facility: OTHER | Age: 39
End: 2019-06-20
Attending: OBSTETRICS & GYNECOLOGY
Payer: COMMERCIAL

## 2019-06-20 VITALS
HEART RATE: 84 BPM | WEIGHT: 190 LBS | DIASTOLIC BLOOD PRESSURE: 76 MMHG | SYSTOLIC BLOOD PRESSURE: 116 MMHG | HEIGHT: 63 IN | OXYGEN SATURATION: 96 % | BODY MASS INDEX: 33.66 KG/M2

## 2019-06-20 DIAGNOSIS — R87.612 PAPANICOLAOU SMEAR OF CERVIX WITH LOW GRADE SQUAMOUS INTRAEPITHELIAL LESION (LGSIL): Primary | ICD-10-CM

## 2019-06-20 DIAGNOSIS — R87.810 PAP SMEAR OF CERVIX SHOWS HIGH RISK HPV PRESENT: ICD-10-CM

## 2019-06-20 PROCEDURE — 88305 TISSUE EXAM BY PATHOLOGIST: CPT | Mod: TC | Performed by: OBSTETRICS & GYNECOLOGY

## 2019-06-20 PROCEDURE — 57454 BX/CURETT OF CERVIX W/SCOPE: CPT | Performed by: OBSTETRICS & GYNECOLOGY

## 2019-06-20 PROCEDURE — 99202 OFFICE O/P NEW SF 15 MIN: CPT | Mod: 25 | Performed by: OBSTETRICS & GYNECOLOGY

## 2019-06-20 ASSESSMENT — MIFFLIN-ST. JEOR: SCORE: 1520.96

## 2019-06-20 ASSESSMENT — PAIN SCALES - GENERAL: PAINLEVEL: NO PAIN (0)

## 2019-06-20 NOTE — NURSING NOTE
"Chief Complaint   Patient presents with     Colposcopy       Initial /76 (BP Location: Right arm, Cuff Size: Adult Large)   Pulse 84   Ht 1.6 m (5' 3\")   Wt 86.2 kg (190 lb)   SpO2 96%   BMI 33.66 kg/m   Estimated body mass index is 33.66 kg/m  as calculated from the following:    Height as of this encounter: 1.6 m (5' 3\").    Weight as of this encounter: 86.2 kg (190 lb).  Medication Reconciliation: complete     Elizabeth Vázquez        "

## 2019-06-20 NOTE — PROGRESS NOTES
"Etta Obrien is a 38 year old female P2(vag)   who presents for abnormal pap smear evaluation referred by Dr. Carrillo.     Pap smear 1 months ago showed: LGSIL. The prior pap showed with high risk HPV present: (other).   This will be her initial colposcopy.    No LMP recorded. (Menstrual status: Birth Control).   UPT today is not done    Past GYN history:  HPV  Prior cervical/vaginal disease: Normal exam without visible pathology.  Prior cervical treatment: no treatment.  Tobacco use:No    PMH, Fam Hx, Soc Hx Reviewed.    ROS:  Neg GI/    PROCEDURE:  Before the procedure, it was ensured that the patient was educated regarding the nature of her findings to date, the implications, and what was to be done. She has been made aware of the role of HPV, the natural history of infection, ways to minimize her future risk, the effect of HPV on the cervix, and treatment options available should they be indicated.  The details of the colposcopic procedure were reviewed.  All questions were answered before proceeding, and informed consent was therefore obtained.    Speculum placed in vagina and excellent visualization of cervix   acheived, cervix swabbed x 3 with acetic acid solution.    FINDINGS:  EXAM:  Blood pressure 116/76, pulse 84, height 1.6 m (5' 3\"), weight 86.2 kg (190 lb), SpO2 96 %, currently breastfeeding.  BMI= Body mass index is 33.66 kg/m .  No LMP recorded. (Menstrual status: Birth Control).  General - pleasant female in no acute distress.  Neurological - alert and oriented X 3  Psychiatric - normal mood and affect    Pelvic-  Cervix: acetowhitening noted 10-12:00.  Biopsy performed 12:00      Pap repeated?:  No  SCJ seen?:  yes    ECC done?:  Yes   Lugol's solution used?:  Yes   Satisfactory examination?:  yes      ASSESSMENT: HPV related changes and AMADEO 1.    PLAN: specimens labelled and sent to Pathology, will base further treatment on Pathology findings, treatment options discussed with patient, post " biopsy instructions given to patient and call to discuss Pathology results    Discussed cervical dysplasia/HPV, importance of follow up.  Handouts and my card and phone number given to patient.  She will call if she has not heard results within 2 weeks.    Amanuel Purcell

## 2019-06-24 DIAGNOSIS — E03.9 HYPOTHYROIDISM, UNSPECIFIED TYPE: ICD-10-CM

## 2019-06-24 LAB — COPATH REPORT: NORMAL

## 2019-06-24 RX ORDER — LEVOTHYROXINE SODIUM 100 UG/1
TABLET ORAL
Qty: 90 TABLET | Refills: 0 | OUTPATIENT
Start: 2019-06-24

## 2019-09-04 ENCOUNTER — MYC MEDICAL ADVICE (OUTPATIENT)
Dept: FAMILY MEDICINE | Facility: OTHER | Age: 39
End: 2019-09-04

## 2019-09-05 NOTE — TELEPHONE ENCOUNTER
Sent pt a [employer] message after talking to Yvette regarding her medication.  Yvette states they will contact her insurance company and go from there.

## 2019-09-05 NOTE — TELEPHONE ENCOUNTER
Can you contact the patient's pharmacy and see if there is anything they suggest?  Or patient might want to contact her insurance company and see if she can get a one time exemption for early refill.

## 2019-09-21 DIAGNOSIS — F33.1 MAJOR DEPRESSIVE DISORDER, RECURRENT EPISODE, MODERATE (H): ICD-10-CM

## 2019-09-23 RX ORDER — BUPROPION HYDROCHLORIDE 450 MG/1
TABLET, FILM COATED, EXTENDED RELEASE ORAL
Qty: 30 TABLET | Refills: 1 | Status: SHIPPED | OUTPATIENT
Start: 2019-09-23 | End: 2019-12-03

## 2019-09-23 RX ORDER — SERTRALINE HYDROCHLORIDE 100 MG/1
TABLET, FILM COATED ORAL
Qty: 30 TABLET | Refills: 1 | Status: SHIPPED | OUTPATIENT
Start: 2019-09-23 | End: 2019-12-03

## 2019-12-23 ENCOUNTER — ALLIED HEALTH/NURSE VISIT (OUTPATIENT)
Dept: FAMILY MEDICINE | Facility: OTHER | Age: 39
End: 2019-12-23
Attending: FAMILY MEDICINE
Payer: COMMERCIAL

## 2019-12-23 ENCOUNTER — MYC MEDICAL ADVICE (OUTPATIENT)
Dept: FAMILY MEDICINE | Facility: OTHER | Age: 39
End: 2019-12-23

## 2019-12-23 DIAGNOSIS — Z23 NEED FOR PROPHYLACTIC VACCINATION AND INOCULATION AGAINST INFLUENZA: Primary | ICD-10-CM

## 2019-12-23 PROCEDURE — 90686 IIV4 VACC NO PRSV 0.5 ML IM: CPT

## 2019-12-23 PROCEDURE — 90471 IMMUNIZATION ADMIN: CPT

## 2020-01-06 DIAGNOSIS — Z30.09 FAMILY PLANNING: ICD-10-CM

## 2020-01-06 DIAGNOSIS — F33.1 MAJOR DEPRESSIVE DISORDER, RECURRENT EPISODE, MODERATE (H): ICD-10-CM

## 2020-01-08 RX ORDER — NORETHINDRONE ACETATE AND ETHINYL ESTRADIOL AND FERROUS FUMARATE 1MG-20(21)
KIT ORAL
Qty: 84 TABLET | Refills: 3 | OUTPATIENT
Start: 2020-01-08

## 2020-01-08 RX ORDER — SERTRALINE HYDROCHLORIDE 100 MG/1
TABLET, FILM COATED ORAL
Qty: 30 TABLET | Refills: 1 | Status: SHIPPED | OUTPATIENT
Start: 2020-01-08 | End: 2020-03-19

## 2020-01-08 RX ORDER — BUPROPION HYDROCHLORIDE 450 MG/1
TABLET, FILM COATED, EXTENDED RELEASE ORAL
Qty: 30 TABLET | Refills: 1 | Status: SHIPPED | OUTPATIENT
Start: 2020-01-08 | End: 2020-03-19

## 2020-01-13 ENCOUNTER — MYC MEDICAL ADVICE (OUTPATIENT)
Dept: FAMILY MEDICINE | Facility: OTHER | Age: 40
End: 2020-01-13

## 2020-01-13 DIAGNOSIS — Z30.09 FAMILY PLANNING: ICD-10-CM

## 2020-01-13 RX ORDER — NORETHINDRONE ACETATE AND ETHINYL ESTRADIOL 1MG-20(21)
1 KIT ORAL DAILY
Qty: 84 TABLET | Refills: 3 | Status: SHIPPED | OUTPATIENT
Start: 2020-01-13 | End: 2020-03-16

## 2020-03-02 ENCOUNTER — HEALTH MAINTENANCE LETTER (OUTPATIENT)
Age: 40
End: 2020-03-02

## 2020-03-16 DIAGNOSIS — Z30.09 FAMILY PLANNING: ICD-10-CM

## 2020-03-16 RX ORDER — NORETHINDRONE ACETATE AND ETHINYL ESTRADIOL 1MG-20(21)
1 KIT ORAL DAILY
Qty: 84 TABLET | Refills: 2 | Status: SHIPPED | OUTPATIENT
Start: 2020-03-16 | End: 2020-09-10

## 2020-03-18 DIAGNOSIS — F33.1 MAJOR DEPRESSIVE DISORDER, RECURRENT EPISODE, MODERATE (H): ICD-10-CM

## 2020-03-19 RX ORDER — BUPROPION HYDROCHLORIDE 450 MG/1
TABLET, FILM COATED, EXTENDED RELEASE ORAL
Qty: 30 TABLET | Refills: 1 | Status: SHIPPED | OUTPATIENT
Start: 2020-03-19 | End: 2020-06-04

## 2020-03-19 RX ORDER — SERTRALINE HYDROCHLORIDE 100 MG/1
TABLET, FILM COATED ORAL
Qty: 30 TABLET | Refills: 1 | Status: SHIPPED | OUTPATIENT
Start: 2020-03-19 | End: 2020-06-04

## 2020-05-15 ENCOUNTER — MYC MEDICAL ADVICE (OUTPATIENT)
Dept: FAMILY MEDICINE | Facility: OTHER | Age: 40
End: 2020-05-15

## 2020-05-15 DIAGNOSIS — Z30.09 FAMILY PLANNING: ICD-10-CM

## 2020-05-15 RX ORDER — NORETHINDRONE ACETATE AND ETHINYL ESTRADIOL 1MG-20(21)
1 KIT ORAL DAILY
Qty: 84 TABLET | Refills: 2 | Status: CANCELLED | OUTPATIENT
Start: 2020-05-15

## 2020-05-15 NOTE — TELEPHONE ENCOUNTER
Norethindrone-ethinyl estradiol      Last Written Prescription Date:  03/16/2020  Last Fill Quantity: 84,   # refills: 2  Last Office Visit: 06/20/2019  Future Office visit:    Next 5 appointments (look out 90 days)    Jul 27, 2020 11:00 AM CDT  (Arrive by 10:45 AM)  PHYSICAL with Ashley Allen MD  [facility] ([facility] ) [address]  [place] [address]  [phone]

## 2020-06-02 DIAGNOSIS — F33.1 MAJOR DEPRESSIVE DISORDER, RECURRENT EPISODE, MODERATE (H): ICD-10-CM

## 2020-06-02 DIAGNOSIS — E03.9 HYPOTHYROIDISM, UNSPECIFIED TYPE: ICD-10-CM

## 2020-06-04 RX ORDER — SERTRALINE HYDROCHLORIDE 100 MG/1
TABLET, FILM COATED ORAL
Qty: 30 TABLET | Refills: 1 | Status: SHIPPED | OUTPATIENT
Start: 2020-06-04 | End: 2020-08-06

## 2020-06-04 RX ORDER — LEVOTHYROXINE SODIUM 100 MCG
TABLET ORAL
Qty: 30 TABLET | Refills: 1 | Status: SHIPPED | OUTPATIENT
Start: 2020-06-04 | End: 2021-01-12

## 2020-06-04 RX ORDER — BUPROPION HYDROCHLORIDE 450 MG/1
TABLET, FILM COATED, EXTENDED RELEASE ORAL
Qty: 30 TABLET | Refills: 1 | Status: SHIPPED | OUTPATIENT
Start: 2020-06-04 | End: 2020-08-06

## 2020-06-04 NOTE — TELEPHONE ENCOUNTER
Failed protocols due to     PHQ-9 score less than 5 in past 6 months Protocol Details    Recent (6 mo) or future (30 days) visit within the authorizing provider's specialty      PHQ-9 score:    PHQ 5/13/2019   PHQ-9 Total Score 7   Q9: Thoughts of better off dead/self-harm past 2 weeks Not at all

## 2020-06-04 NOTE — TELEPHONE ENCOUNTER
sertraline (ZOLOFT) 100 MG tablet       Last Written Prescription Date:  3/19/2020  Last Fill Quantity: 30,   # refills: 1  Last Office Visit: 5/13/2019  Future Office visit:    Next 5 appointments (look out 90 days)    Jul 27, 2020 11:00 AM CDT  (Arrive by 10:45 AM)  PHYSICAL with Ashley Allen MD  [facility] ([facility] ) [address]  [place] [address]  [phone]           SYNTHROID 100 MCG tablet       Last Written Prescription Date:  1/9/2020  Last Fill Quantity: 30,   # refills: 5  Last Office Visit: 5/13/2019  Future Office visit:    Next 5 appointments (look out 90 days)    Jul 27, 2020 11:00 AM CDT  (Arrive by 10:45 AM)  PHYSICAL with Ashley Allen MD  [facility] ([facility] ) [address]  [place] [address]  [phone]         buPROPion HCl ER, XL, 450 MG TB24       Last Written Prescription Date:  3/19/2020  Last Fill Quantity: 30,   # refills: 1  Last Office Visit: 5/13/2019  Future Office visit:    Next 5 appointments (look out 90 days)    Jul 27, 2020 11:00 AM CDT  (Arrive by 10:45 AM)  PHYSICAL with Ashley Allen MD  [facility] ([facility] ) [address]  [place] [address]  [phone]

## 2020-08-02 DIAGNOSIS — F33.1 MAJOR DEPRESSIVE DISORDER, RECURRENT EPISODE, MODERATE (H): ICD-10-CM

## 2020-08-05 NOTE — TELEPHONE ENCOUNTER
Bupropion       Last Written Prescription Date:  6/4/2020  Last Fill Quantity: 30,   # refills: 1    Zoloft       Last Written Prescription Date:  6/4/2020  Last Fill Quantity: 30,   # refills: 1  Last Office Visit: 6/20/2019  Future Office visit:    Next 5 appointments (look out 90 days)    Sep 01, 2020  9:00 AM CDT  (Arrive by 8:45 AM)  PHYSICAL with Ashley Allen MD  [facility] ([facility] [place] [facility] ) [address]  [place] [address]  [phone]

## 2020-08-06 RX ORDER — BUPROPION HYDROCHLORIDE 450 MG/1
TABLET, FILM COATED, EXTENDED RELEASE ORAL
Qty: 30 TABLET | Refills: 1 | Status: SHIPPED | OUTPATIENT
Start: 2020-08-06 | End: 2020-10-12

## 2020-08-06 RX ORDER — SERTRALINE HYDROCHLORIDE 100 MG/1
TABLET, FILM COATED ORAL
Qty: 30 TABLET | Refills: 1 | Status: SHIPPED | OUTPATIENT
Start: 2020-08-06 | End: 2020-10-12

## 2020-08-31 NOTE — PROGRESS NOTES
SUBJECTIVE:   CC: Etta Obrien is an 39 year old woman who presents for preventive health visit.     Healthy Habits:    Do you get at least three servings of calcium containing foods daily (dairy, green leafy vegetables, etc.)? yes    Amount of exercise or daily activities, outside of work: 4-5 day(s) per week    Problems taking medications regularly No    Medication side effects: No    Have you had an eye exam in the past two years? no    Do you see a dentist twice per year? yes    Do you have sleep apnea, excessive snoring or daytime drowsiness?no      Depression and Anxiety Follow-Up    How are you doing with your depression since your last visit? No change    How are you doing with your anxiety since your last visit?  No change    Are you having other symptoms that might be associated with depression or anxiety? No    Have you had a significant life event? No     Do you have any concerns with your use of alcohol or other drugs? No    Social History     Tobacco Use     Smoking status: Never Smoker     Smokeless tobacco: Never Used   Substance Use Topics     Alcohol use: No     Drug use: No     PHQ 9/24/2018 5/13/2019 9/1/2020   PHQ-9 Total Score 2 7 6   Q9: Thoughts of better off dead/self-harm past 2 weeks Not at all Not at all Not at all     ARNALDO-7 SCORE 9/24/2018 5/13/2019 9/1/2020   Total Score 0 5 5       Suicide Assessment Five-step Evaluation and Treatment (SAFE-T)      Hypothyroidism Follow-up      Since last visit, patient describes the following symptoms: weight gain and fatigue      Today's PHQ-2 Score:   PHQ-2 ( 1999 Pfizer) 8/31/2020 9/16/2013   Q1: Little interest or pleasure in doing things 1 0   Q2: Feeling down, depressed or hopeless 1 0   PHQ-2 Score 2 0   Q1: Little interest or pleasure in doing things Several days -   Q2: Feeling down, depressed or hopeless Several days -   PHQ-2 Score 2 -       Abuse: Current or Past(Physical, Sexual or Emotional)- No  Do you feel safe in your  environment? Yes        Social History     Tobacco Use     Smoking status: Never Smoker     Smokeless tobacco: Never Used   Substance Use Topics     Alcohol use: No     If you drink alcohol do you typically have >3 drinks per day or >7 drinks per week? No                     Reviewed orders with patient.  Reviewed health maintenance and updated orders accordingly - Yes  Patient Active Problem List   Diagnosis     PCOS (polycystic ovarian syndrome)     Hirsutism     Elevated testosterone level in female     Ovarian hyperthecosis     History of pre-term labor     Hypothyroidism     Infertility, anovulation     PVC's (premature ventricular contractions)     H/O premature delivery     Bilateral carpal tunnel syndrome      (spontaneous vaginal delivery)     ACP (advance care planning)     Major depressive disorder, recurrent episode, moderate (H)     Gynecomastia, female     High risk HPV infection     Papanicolaou smear of cervix with low grade squamous intraepithelial lesion (LGSIL)     Past Surgical History:   Procedure Laterality Date     dental surgical procedure       LAPAROSCOPIC TUBAL DYE STUDY N/A 9/3/2014    Procedure: LAPAROSCOPIC TUBAL DYE STUDY;  Surgeon: Amanuel Purcell MD;  Location: [facility]     MAMMOPLASTY REDUCTION  10/19       Social History     Tobacco Use     Smoking status: Never Smoker     Smokeless tobacco: Never Used   Substance Use Topics     Alcohol use: No     Family History   Problem Relation Age of Onset     Lipids Father         hyperlipidemia     Hypertension Father      Lipids Mother         hyperlipidemia     Lupus Mother         erythematosus     Diabetes Other      Thyroid Disease Other      Asthma No family hx of          Current Outpatient Medications   Medication Sig Dispense Refill     buPROPion HCl ER, XL, 450 MG TB24 TAKE 1 TABLET BY MOUTH DAILY 30 tablet 1     norethindrone-ethinyl estradiol (JUNEL FE 1/20) 1-20 MG-MCG tablet Take 1 tablet by mouth daily , patient skips placebo  pills 84 tablet 2     sertraline (ZOLOFT) 100 MG tablet TAKE 1 TABLET(100 MG) BY MOUTH DAILY 30 tablet 1     SYNTHROID 100 MCG tablet TAKE 1 TABLET BY MOUTH EVERY DAY 30 tablet 1     Allergies   Allergen Reactions     Macrolides Hives     Macrolide antibiotics     Penicillins Hives     Sulfonamide Derivatives Hives     Sulfa       Mammogram not appropriate for this patient based on age.    Pertinent mammograms are reviewed under the imaging tab.  History of abnormal Pap smear: YES - updated in Problem List and Health Maintenance accordingly  PAP / HPV Latest Ref Rng & Units 5/13/2019 5/9/2018 1/25/2017   PAP - LSIL(A) NIL NIL   HPV 16 DNA NEG:Negative Negative Negative Negative   HPV 18 DNA NEG:Negative Negative Negative Negative   OTHER HR HPV NEG:Negative Positive(A) Positive(A) Negative   HPV HIGH RISK DNA PROBE - - - -     Reviewed and updated as needed this visit by clinical staff  Tobacco  Allergies  Meds         Reviewed and updated as needed this visit by Provider            ROS:  CONSTITUTIONAL: NEGATIVE for fever, chills, change in weight  INTEGUMENTARU/SKIN: NEGATIVE for worrisome rashes, moles or lesions  EYES: NEGATIVE for vision changes or irritation  ENT: NEGATIVE for ear, mouth and throat problems  RESP: NEGATIVE for significant cough or SOB  BREAST: NEGATIVE for masses, tenderness or discharge  CV: NEGATIVE for chest pain, palpitations or peripheral edema  GI: NEGATIVE for nausea, abdominal pain, heartburn, or change in bowel habits  : NEGATIVE for unusual urinary or vaginal symptoms. Periods are regular.  MUSCULOSKELETAL: NEGATIVE for significant arthralgias or myalgia  NEURO: NEGATIVE for weakness, dizziness or paresthesias  PSYCHIATRIC: NEGATIVE for changes in mood or affect    OBJECTIVE:   /66 (BP Location: Right arm, Patient Position: Chair, Cuff Size: Adult Regular)   Pulse 93   Temp 97.9  F (36.6  C) (Tympanic)   SpO2 98%   EXAM:  GENERAL: healthy, alert and no distress  EYES:  "Eyes grossly normal to inspection, PERRL and conjunctivae and sclerae normal  HENT: ear canals and TM's normal, nose and mouth without ulcers or lesions  NECK: no adenopathy  RESP: lungs clear to auscultation - no rales, rhonchi or wheezes  BREAST: normal without masses, tenderness or nipple discharge and no palpable axillary masses or adenopathy  CV: regular rate and rhythm, normal S1 S2, no S3 or S4, no murmur, click or rub, no peripheral edema and peripheral pulses strong  ABDOMEN: soft, nontender, no hepatosplenomegaly, no masses and bowel sounds normal   (female): normal female external genitalia, normal urethral meatus, vaginal mucosa pink, moist, well rugated, and normal cervix/adnexa/uterus without masses or discharge  MS: no gross musculoskeletal defects noted, no edema  SKIN: no suspicious lesions or rashes; small fibroma on leg  NEURO: Normal strength and tone, mentation intact and speech normal  PSYCH: mentation appears normal, affect normal/bright    Diagnostic Test Results:  See orders    ASSESSMENT/PLAN:       ICD-10-CM    1. Routine general medical examination at a health care facility  Z00.00 Lipid Profile (Chol, Trig, HDL, LDL calc)     Glucose   2. Papanicolaou smear of cervix with low grade squamous intraepithelial lesion (LGSIL)  R87.612 A pap thin layer screen with  HPV - recommended age 30 - 65 years (select HPV order below)     HPV High Risk Types DNA Cervical   3. Major depressive disorder, recurrent episode, moderate (H)  F33.1    4. Hypothyroidism, unspecified type  E03.9 TSH with free T4 reflex   5. Fatigue, unspecified type  R53.83 CBC with platelets     Ferritin       COUNSELING:   Reviewed preventive health counseling, as reflected in patient instructions    Estimated body mass index is 33.66 kg/m  as calculated from the following:    Height as of 6/20/19: 1.6 m (5' 3\").    Weight as of 6/20/19: 86.2 kg (190 lb).        She reports that she has never smoked. She has never used " smokeless tobacco.      Counseling Resources:  ATP IV Guidelines  Pooled Cohorts Equation Calculator  Breast Cancer Risk Calculator  BRCA-Related Cancer Risk Assessment: FHS-7 Tool  FRAX Risk Assessment  ICSI Preventive Guidelines  Dietary Guidelines for Americans, 2010  USDA's MyPlate  ASA Prophylaxis  Lung CA Screening    Ashley Allen MD  [facility]

## 2020-09-01 ENCOUNTER — OFFICE VISIT (OUTPATIENT)
Dept: FAMILY MEDICINE | Facility: OTHER | Age: 40
End: 2020-09-01
Attending: FAMILY MEDICINE
Payer: COMMERCIAL

## 2020-09-01 VITALS
DIASTOLIC BLOOD PRESSURE: 66 MMHG | TEMPERATURE: 97.9 F | SYSTOLIC BLOOD PRESSURE: 104 MMHG | HEART RATE: 93 BPM | OXYGEN SATURATION: 98 %

## 2020-09-01 DIAGNOSIS — E03.9 HYPOTHYROIDISM, UNSPECIFIED TYPE: ICD-10-CM

## 2020-09-01 DIAGNOSIS — F33.1 MAJOR DEPRESSIVE DISORDER, RECURRENT EPISODE, MODERATE (H): ICD-10-CM

## 2020-09-01 DIAGNOSIS — Z00.00 ROUTINE GENERAL MEDICAL EXAMINATION AT A HEALTH CARE FACILITY: Primary | ICD-10-CM

## 2020-09-01 DIAGNOSIS — R53.83 FATIGUE, UNSPECIFIED TYPE: ICD-10-CM

## 2020-09-01 DIAGNOSIS — R87.612 PAPANICOLAOU SMEAR OF CERVIX WITH LOW GRADE SQUAMOUS INTRAEPITHELIAL LESION (LGSIL): ICD-10-CM

## 2020-09-01 LAB
CHOLEST SERPL-MCNC: 216 MG/DL
ERYTHROCYTE [DISTWIDTH] IN BLOOD BY AUTOMATED COUNT: 12.4 % (ref 10–15)
FERRITIN SERPL-MCNC: 93 NG/ML (ref 12–150)
GLUCOSE SERPL-MCNC: 83 MG/DL (ref 70–99)
HCT VFR BLD AUTO: 42.9 % (ref 35–47)
HDLC SERPL-MCNC: 45 MG/DL
HGB BLD-MCNC: 14.5 G/DL (ref 11.7–15.7)
LDLC SERPL CALC-MCNC: 129 MG/DL
MCH RBC QN AUTO: 30.6 PG (ref 26.5–33)
MCHC RBC AUTO-ENTMCNC: 33.8 G/DL (ref 31.5–36.5)
MCV RBC AUTO: 91 FL (ref 78–100)
NONHDLC SERPL-MCNC: 171 MG/DL
PLATELET # BLD AUTO: 254 10E9/L (ref 150–450)
RBC # BLD AUTO: 4.74 10E12/L (ref 3.8–5.2)
TRIGL SERPL-MCNC: 210 MG/DL
TSH SERPL DL<=0.005 MIU/L-ACNC: 1.46 MU/L (ref 0.4–4)
WBC # BLD AUTO: 8.5 10E9/L (ref 4–11)

## 2020-09-01 PROCEDURE — 36415 COLL VENOUS BLD VENIPUNCTURE: CPT | Performed by: FAMILY MEDICINE

## 2020-09-01 PROCEDURE — 84443 ASSAY THYROID STIM HORMONE: CPT | Performed by: FAMILY MEDICINE

## 2020-09-01 PROCEDURE — 99395 PREV VISIT EST AGE 18-39: CPT | Performed by: FAMILY MEDICINE

## 2020-09-01 PROCEDURE — 88142 CYTOPATH C/V THIN LAYER: CPT | Performed by: FAMILY MEDICINE

## 2020-09-01 PROCEDURE — 82947 ASSAY GLUCOSE BLOOD QUANT: CPT | Performed by: FAMILY MEDICINE

## 2020-09-01 PROCEDURE — 85027 COMPLETE CBC AUTOMATED: CPT | Performed by: FAMILY MEDICINE

## 2020-09-01 PROCEDURE — 80061 LIPID PANEL: CPT | Performed by: FAMILY MEDICINE

## 2020-09-01 PROCEDURE — 87624 HPV HI-RISK TYP POOLED RSLT: CPT | Performed by: FAMILY MEDICINE

## 2020-09-01 PROCEDURE — 82728 ASSAY OF FERRITIN: CPT | Performed by: FAMILY MEDICINE

## 2020-09-01 ASSESSMENT — PAIN SCALES - GENERAL: PAINLEVEL: NO PAIN (0)

## 2020-09-01 ASSESSMENT — ANXIETY QUESTIONNAIRES
5. BEING SO RESTLESS THAT IT IS HARD TO SIT STILL: SEVERAL DAYS
2. NOT BEING ABLE TO STOP OR CONTROL WORRYING: NOT AT ALL
6. BECOMING EASILY ANNOYED OR IRRITABLE: SEVERAL DAYS
1. FEELING NERVOUS, ANXIOUS, OR ON EDGE: SEVERAL DAYS
4. TROUBLE RELAXING: SEVERAL DAYS
GAD7 TOTAL SCORE: 5
3. WORRYING TOO MUCH ABOUT DIFFERENT THINGS: SEVERAL DAYS
IF YOU CHECKED OFF ANY PROBLEMS ON THIS QUESTIONNAIRE, HOW DIFFICULT HAVE THESE PROBLEMS MADE IT FOR YOU TO DO YOUR WORK, TAKE CARE OF THINGS AT HOME, OR GET ALONG WITH OTHER PEOPLE: NOT DIFFICULT AT ALL
7. FEELING AFRAID AS IF SOMETHING AWFUL MIGHT HAPPEN: NOT AT ALL

## 2020-09-01 ASSESSMENT — PATIENT HEALTH QUESTIONNAIRE - PHQ9: SUM OF ALL RESPONSES TO PHQ QUESTIONS 1-9: 6

## 2020-09-01 NOTE — NURSING NOTE
"Chief Complaint   Patient presents with     Physical       Initial There were no vitals taken for this visit. Estimated body mass index is 33.66 kg/m  as calculated from the following:    Height as of 6/20/19: 1.6 m (5' 3\").    Weight as of 6/20/19: 86.2 kg (190 lb).  Medication Reconciliation: complete  Aaliyah New LPN    "

## 2020-09-02 ASSESSMENT — ANXIETY QUESTIONNAIRES: GAD7 TOTAL SCORE: 5

## 2020-09-03 LAB
COPATH REPORT: NORMAL
PAP: NORMAL

## 2020-09-09 LAB
FINAL DIAGNOSIS: NORMAL
HPV HR 12 DNA CVX QL NAA+PROBE: NEGATIVE
HPV16 DNA SPEC QL NAA+PROBE: NEGATIVE
HPV18 DNA SPEC QL NAA+PROBE: NEGATIVE
SPECIMEN DESCRIPTION: NORMAL
SPECIMEN SOURCE CVX/VAG CYTO: NORMAL

## 2020-09-10 DIAGNOSIS — Z30.09 FAMILY PLANNING: ICD-10-CM

## 2020-09-10 RX ORDER — NORETHINDRONE ACETATE AND ETHINYL ESTRADIOL 1MG-20(21)
1 KIT ORAL DAILY
Qty: 84 TABLET | Refills: 3 | Status: SHIPPED | OUTPATIENT
Start: 2020-09-10 | End: 2020-09-18

## 2020-09-10 NOTE — TELEPHONE ENCOUNTER
Junel       Last Written Prescription Date:  3/16/2020  Last Fill Quantity: 84,   # refills: 2  Last Office Visit: 9/1/2020  Future Office visit:

## 2020-09-11 ENCOUNTER — TELEPHONE (OUTPATIENT)
Dept: FAMILY MEDICINE | Facility: OTHER | Age: 40
End: 2020-09-11

## 2020-09-18 ENCOUNTER — MYC MEDICAL ADVICE (OUTPATIENT)
Dept: FAMILY MEDICINE | Facility: OTHER | Age: 40
End: 2020-09-18

## 2020-09-18 DIAGNOSIS — Z30.09 FAMILY PLANNING: ICD-10-CM

## 2020-09-18 RX ORDER — NORETHINDRONE ACETATE AND ETHINYL ESTRADIOL 1MG-20(21)
1 KIT ORAL DAILY
Qty: 84 TABLET | Refills: 3 | Status: SHIPPED | OUTPATIENT
Start: 2020-09-18 | End: 2021-01-12

## 2020-09-18 NOTE — TELEPHONE ENCOUNTER
Pharmacy is saying that they have not heard from us but it has been filled.  Do you want to resend or do you want me to call.

## 2020-10-07 DIAGNOSIS — F33.1 MAJOR DEPRESSIVE DISORDER, RECURRENT EPISODE, MODERATE (H): ICD-10-CM

## 2020-10-12 RX ORDER — BUPROPION HYDROCHLORIDE 450 MG/1
TABLET, FILM COATED, EXTENDED RELEASE ORAL
Qty: 90 TABLET | Refills: 3 | Status: SHIPPED | OUTPATIENT
Start: 2020-10-12 | End: 2021-01-12

## 2020-10-12 RX ORDER — SERTRALINE HYDROCHLORIDE 100 MG/1
TABLET, FILM COATED ORAL
Qty: 90 TABLET | Refills: 3 | Status: SHIPPED | OUTPATIENT
Start: 2020-10-12 | End: 2021-09-28

## 2020-10-12 NOTE — TELEPHONE ENCOUNTER
Bupropion       Last Written Prescription Date:  8/6/2020  Last Fill Quantity: 30,   # refills: 1    Zoloft       Last Written Prescription Date:  8/6/2020  Last Fill Quantity: 30,   # refills: 1  Last Office Visit: 9/1/2020  Future Office visit:

## 2020-12-20 ENCOUNTER — HEALTH MAINTENANCE LETTER (OUTPATIENT)
Age: 40
End: 2020-12-20

## 2021-01-12 DIAGNOSIS — Z30.09 FAMILY PLANNING: ICD-10-CM

## 2021-01-12 DIAGNOSIS — E03.9 HYPOTHYROIDISM, UNSPECIFIED TYPE: ICD-10-CM

## 2021-01-12 RX ORDER — NORETHINDRONE ACETATE AND ETHINYL ESTRADIOL AND FERROUS FUMARATE 1MG-20(21)
KIT ORAL
Qty: 84 TABLET | Refills: 3 | Status: SHIPPED | OUTPATIENT
Start: 2021-01-12 | End: 2021-09-23

## 2021-01-12 RX ORDER — LEVOTHYROXINE SODIUM 100 MCG
TABLET ORAL
Qty: 30 TABLET | Refills: 5 | Status: SHIPPED | OUTPATIENT
Start: 2021-01-12 | End: 2021-07-07

## 2021-01-12 NOTE — TELEPHONE ENCOUNTER
Aurovela  Last Written Prescription Date: 9/18/20  Last Fill Quantity: 84 # of Refills: 3  Last Office Visit: 9/1/20    Synthroid  Last Written Prescription Date: 6/4/20  Last Fill Quantity: 30 # of Refills: 1  Last Office Visit: 9/1/20

## 2021-01-20 ENCOUNTER — TELEPHONE (OUTPATIENT)
Dept: FAMILY MEDICINE | Facility: OTHER | Age: 41
End: 2021-01-20

## 2021-01-20 NOTE — TELEPHONE ENCOUNTER
Received a PA from [facility] for Bupropion HCI ER (XL) 450mg tablets. Dr. Carrillo prescribed 150mg & 300mg tablets, but the pharmacy put it through as 450mg tablets. I submitted this on CMM. Waiting for a response.

## 2021-01-21 ENCOUNTER — ALLIED HEALTH/NURSE VISIT (OUTPATIENT)
Dept: FAMILY MEDICINE | Facility: OTHER | Age: 41
End: 2021-01-21
Attending: FAMILY MEDICINE
Payer: COMMERCIAL

## 2021-01-21 DIAGNOSIS — Z23 NEED FOR PROPHYLACTIC VACCINATION AND INOCULATION AGAINST INFLUENZA: Primary | ICD-10-CM

## 2021-01-21 PROCEDURE — 90471 IMMUNIZATION ADMIN: CPT

## 2021-01-21 PROCEDURE — 90686 IIV4 VACC NO PRSV 0.5 ML IM: CPT

## 2021-01-21 RX ORDER — BUPROPION HYDROCHLORIDE 300 MG/1
300 TABLET ORAL EVERY MORNING
Qty: 90 TABLET | Refills: 1 | Status: SHIPPED | OUTPATIENT
Start: 2021-01-21 | End: 2021-07-15

## 2021-01-21 RX ORDER — BUPROPION HYDROCHLORIDE 150 MG/1
150 TABLET ORAL EVERY MORNING
Qty: 90 TABLET | Refills: 1 | Status: SHIPPED | OUTPATIENT
Start: 2021-01-21 | End: 2021-07-15

## 2021-01-21 NOTE — TELEPHONE ENCOUNTER
Received a DENIAL from [facility] for Bupropion HCI ER (XL) 450mg tablets.    Forms scanned to Epic.

## 2021-04-21 ENCOUNTER — TELEPHONE (OUTPATIENT)
Dept: FAMILY MEDICINE | Facility: OTHER | Age: 41
End: 2021-04-21

## 2021-07-07 DIAGNOSIS — E03.9 HYPOTHYROIDISM, UNSPECIFIED TYPE: ICD-10-CM

## 2021-07-07 RX ORDER — LEVOTHYROXINE SODIUM 100 MCG
TABLET ORAL
Qty: 30 TABLET | Refills: 2 | Status: SHIPPED | OUTPATIENT
Start: 2021-07-07 | End: 2021-07-15

## 2021-07-07 NOTE — TELEPHONE ENCOUNTER
Synthroid 100mcg      Last Written Prescription Date:  1/12/21  Last Fill Quantity: 30,   # refills: 5  Last Office Visit: 9/1/20  Future Office visit:       Routing refill request to provider for review/approval because:

## 2021-07-13 DIAGNOSIS — E03.9 HYPOTHYROIDISM, UNSPECIFIED TYPE: ICD-10-CM

## 2021-07-14 NOTE — TELEPHONE ENCOUNTER
Wellbutrin 150 mg    300 mg  Last Written Prescription Date:  1/21/21  Last Fill Quantity: 90,   # refills: 1  Last Office Visit: 9/1/20  Future Office visit:       Routing refill request to provider for review/approval because:

## 2021-07-15 RX ORDER — BUPROPION HYDROCHLORIDE 150 MG/1
150 TABLET ORAL EVERY MORNING
Qty: 90 TABLET | Refills: 0 | Status: SHIPPED | OUTPATIENT
Start: 2021-07-15 | End: 2021-10-15

## 2021-07-15 RX ORDER — BUPROPION HYDROCHLORIDE 300 MG/1
300 TABLET ORAL EVERY MORNING
Qty: 90 TABLET | Refills: 0 | Status: SHIPPED | OUTPATIENT
Start: 2021-07-15 | End: 2021-10-15

## 2021-07-15 RX ORDER — BUPROPION HYDROCHLORIDE 150 MG/1
150 TABLET ORAL EVERY MORNING
OUTPATIENT
Start: 2021-07-15

## 2021-07-15 RX ORDER — LEVOTHYROXINE SODIUM 100 MCG
100 TABLET ORAL DAILY
Qty: 90 TABLET | Refills: 0 | Status: SHIPPED | OUTPATIENT
Start: 2021-07-15 | End: 2022-01-10

## 2021-07-15 RX ORDER — BUPROPION HYDROCHLORIDE 150 MG/1
TABLET ORAL
Qty: 90 TABLET | Refills: 0 | OUTPATIENT
Start: 2021-07-15

## 2021-07-15 NOTE — TELEPHONE ENCOUNTER
Pharmacy was an error spoke with domenic ardon in [place], also It looks like her levothyroxine that was filled on 7/7/21 was sent to [place] [facility] as well.      Aaliyah New LPN

## 2021-07-15 NOTE — TELEPHONE ENCOUNTER
All scripts sent to [facility].  Patient will be due for appointment in September

## 2021-08-17 DIAGNOSIS — Z30.09 FAMILY PLANNING: Primary | ICD-10-CM

## 2021-08-18 NOTE — TELEPHONE ENCOUNTER
Aurovela       Last Written Prescription Date:  1/12/2021  Last Fill Quantity: 84,   # refills: 3  Last Office Visit: 9/1/2020  Future Office visit:

## 2021-08-20 RX ORDER — NORETHINDRONE ACETATE AND ETHINYL ESTRADIOL .02; 1 MG/1; MG/1
TABLET ORAL
Qty: 84 TABLET | Refills: 0 | Status: SHIPPED | OUTPATIENT
Start: 2021-08-20 | End: 2021-11-09

## 2021-09-22 NOTE — PROGRESS NOTES
SUBJECTIVE:   CC: Etta Obrien is an 40 year old woman who presents for preventive health visit.     Patient has been advised of split billing requirements and indicates understanding: Yes  HPI    Depression and Anxiety Follow-Up    How are you doing with your depression since your last visit? No change    How are you doing with your anxiety since your last visit?  No change    Are you having other symptoms that might be associated with depression or anxiety? No    Have you had a significant life event? No     Do you have any concerns with your use of alcohol or other drugs? No    Social History     Tobacco Use     Smoking status: Never Smoker     Smokeless tobacco: Never Used   Substance Use Topics     Alcohol use: No     Drug use: No     PHQ 5/13/2019 9/1/2020 9/23/2021   PHQ-9 Total Score 7 6 9   Q9: Thoughts of better off dead/self-harm past 2 weeks Not at all Not at all Not at all     ARNALDO-7 SCORE 5/13/2019 9/1/2020 9/23/2021   Total Score 5 5 2       Suicide Assessment Five-step Evaluation and Treatment (SAFE-T)      Patient has concerns about numbness in her hands.  She does deal with significant plantar fasciitis.  She notes continued fatigue and trouble with her weight.  She does have a family history of hemochromatosis and has been recommended to monitor for that.  She does have frequent headaches and wonders if this has anything to do with her other symptoms.      Today's PHQ-2 Score:   PHQ-2 ( 1999 Pfizer) 8/31/2020   Q1: Little interest or pleasure in doing things 1   Q2: Feeling down, depressed or hopeless 1   PHQ-2 Score 2   Q1: Little interest or pleasure in doing things Several days   Q2: Feeling down, depressed or hopeless Several days   PHQ-2 Score 2       Abuse: Current or Past (Physical, Sexual or Emotional) - No  Do you feel safe in your environment? Yes        Social History     Tobacco Use     Smoking status: Never Smoker     Smokeless tobacco: Never Used   Substance Use Topics      Alcohol use: No     If you drink alcohol do you typically have >3 drinks per day or >7 drinks per week? No    Alcohol Use 2021   Prescreen: >3 drinks/day or >7 drinks/week? -   Prescreen: >3 drinks/day or >7 drinks/week? No       Reviewed orders with patient.  Reviewed health maintenance and updated orders accordingly - Yes  Patient Active Problem List   Diagnosis     PCOS (polycystic ovarian syndrome)     Hirsutism     Elevated testosterone level in female     Ovarian hyperthecosis     History of pre-term labor     Hypothyroidism     Infertility, anovulation     PVC's (premature ventricular contractions)     H/O premature delivery     Bilateral carpal tunnel syndrome      (spontaneous vaginal delivery)     ACP (advance care planning)     Major depressive disorder, recurrent episode, moderate (H)     Gynecomastia, female     High risk HPV infection     Papanicolaou smear of cervix with low grade squamous intraepithelial lesion (LGSIL)     Encounter for other general counseling or advice on contraception     Past Surgical History:   Procedure Laterality Date     dental surgical procedure       LAPAROSCOPIC TUBAL DYE STUDY N/A 9/3/2014    Procedure: LAPAROSCOPIC TUBAL DYE STUDY;  Surgeon: Amanuel Purcell MD;  Location: [facility]     MAMMOPLASTY REDUCTION  10/19       Social History     Tobacco Use     Smoking status: Never Smoker     Smokeless tobacco: Never Used   Substance Use Topics     Alcohol use: No     Family History   Problem Relation Age of Onset     Lipids Father         hyperlipidemia     Hypertension Father      Lipids Mother         hyperlipidemia     Lupus Mother         erythematosus     Diabetes Other      Thyroid Disease Other      Asthma No family hx of          Current Outpatient Medications   Medication Sig Dispense Refill     buPROPion (WELLBUTRIN XL) 150 MG 24 hr tablet Take 1 tablet (150 mg) by mouth every morning , take with 300 mg dose for a daily total of 450 mg 90 tablet 0     buPROPion  (WELLBUTRIN XL) 300 MG 24 hr tablet Take 1 tablet (300 mg) by mouth every morning , take with 150 mg dose for a daily total of 450 mg 90 tablet 0     norethindrone-ethinyl estradiol (MICROGESTIN 1/20) 1-20 MG-MCG tablet TAKE 1 TABLET BY MOUTH EVERY DAY- SKIPPING PLACEBO 84 tablet 0     sertraline (ZOLOFT) 100 MG tablet TAKE 1 TABLET(100 MG) BY MOUTH DAILY 90 tablet 3     SYNTHROID 100 MCG tablet Take 1 tablet (100 mcg) by mouth daily 90 tablet 0     Allergies   Allergen Reactions     Macrolides Hives     Macrolide antibiotics     Penicillins Hives     Sulfonamide Derivatives Hives     Sulfa       Breast Cancer Screening:  Any new diagnosis of family breast, ovarian, or bowel cancer? No    FHS-7: No flowsheet data found.    Patient under 40 years of age: Routine Mammogram Screening not recommended.   Pertinent mammograms are reviewed under the imaging tab.    History of abnormal Pap smear: YES - other categories - see link Cervical Cytology Screening Guidelines  PAP / HPV Latest Ref Rng & Units 9/1/2020 5/13/2019 5/9/2018   PAP (Historical) - NIL LSIL(A) NIL   HPV16 NEG:Negative Negative Negative Negative   HPV18 NEG:Negative Negative Negative Negative   HRHPV NEG:Negative Negative Positive(A) Positive(A)     Reviewed and updated as needed this visit by clinical staff  Tobacco  Allergies  Meds  Problems  Med Hx  Surg Hx  Fam Hx  Soc Hx          Reviewed and updated as needed this visit by Provider  Tobacco  Allergies  Meds  Problems  Med Hx  Surg Hx  Fam Hx             Review of Systems  CONSTITUTIONAL:trouble losing weight  INTEGUMENTARU/SKIN: NEGATIVE for worrisome rashes, moles or lesions  EYES: NEGATIVE for vision changes or irritation  ENT: NEGATIVE for ear, mouth and throat problems  RESP: NEGATIVE for significant cough or SOB  BREAST: NEGATIVE for masses, tenderness or discharge  CV: NEGATIVE for chest pain, palpitations or peripheral edema  GI: NEGATIVE for nausea, abdominal pain, heartburn, or  "change in bowel habits  : NEGATIVE for unusual urinary or vaginal symptoms. Periods are regular.  MUSCULOSKELETAL:see above  NEURO: NEGATIVE for weakness, dizziness or paresthesias  PSYCHIATRIC: NEGATIVE for changes in mood or affect     OBJECTIVE:   /76 (BP Location: Right arm, Patient Position: Sitting, Cuff Size: Adult Regular)   Pulse 105   Temp 98.5  F (36.9  C) (Tympanic)   Resp 16   Ht 1.6 m (5' 2.99\")   Wt 99.1 kg (218 lb 6.4 oz)   SpO2 98%   BMI 38.70 kg/m    Physical Exam  GENERAL: healthy, alert and no distress  EYES: Eyes grossly normal to inspection, PERRL and conjunctivae and sclerae normal  HENT: ear canals and TM's normal, nose and mouth without ulcers or lesions  NECK: no adenopathy  RESP: lungs clear to auscultation - no rales, rhonchi or wheezes  CV: regular rate and rhythm, normal S1 S2, no S3 or S4, no murmur, click or rub, no peripheral edema and peripheral pulses strong  ABDOMEN: soft, nontender, no hepatosplenomegaly, no masses and bowel sounds normal  SKIN: no suspicious lesions or rashes  NEURO: Normal strength and tone, mentation intact and speech normal  PSYCH: mentation appears normal, affect normal/bright    Diagnostic Test Results:  See orders    ASSESSMENT/PLAN:       ICD-10-CM    1. Routine general medical examination at a health care facility  Z00.00    2. Major depressive disorder, recurrent episode, moderate (H)  F33.1    3. Bilateral carpal tunnel syndrome  G56.03 Orthopedic  Referral   4. Plantar fasciitis  M72.2 Orthopedic  Referral   5. Hypothyroidism, unspecified type  E03.9 TSH with free T4 reflex     TSH with free T4 reflex   6. Family history of hemochromatosis  Z83.49 Ferritin     Ferritin   7. Frequent episodic tension-type headache  G44.219 CBC with platelets     Basic metabolic panel     CT Head w/o & w Contrast     CBC with platelets     Basic metabolic panel       Patient has been advised of split billing requirements and indicates " "understanding: Yes  COUNSELING:  Reviewed preventive health counseling, as reflected in patient instructions    Estimated body mass index is 38.7 kg/m  as calculated from the following:    Height as of this encounter: 1.6 m (5' 2.99\").    Weight as of this encounter: 99.1 kg (218 lb 6.4 oz).      She reports that she has never smoked. She has never used smokeless tobacco.      Counseling Resources:  ATP IV Guidelines  Pooled Cohorts Equation Calculator  Breast Cancer Risk Calculator  BRCA-Related Cancer Risk Assessment: FHS-7 Tool  FRAX Risk Assessment  ICSI Preventive Guidelines  Dietary Guidelines for Americans, 2010  USDA's MyPlate  ASA Prophylaxis  Lung CA Screening    Ashley Allen MD  [facility]  "

## 2021-09-23 ENCOUNTER — OFFICE VISIT (OUTPATIENT)
Dept: FAMILY MEDICINE | Facility: OTHER | Age: 41
End: 2021-09-23
Attending: FAMILY MEDICINE
Payer: COMMERCIAL

## 2021-09-23 VITALS
HEART RATE: 105 BPM | HEIGHT: 63 IN | TEMPERATURE: 98.5 F | DIASTOLIC BLOOD PRESSURE: 76 MMHG | OXYGEN SATURATION: 98 % | WEIGHT: 218.4 LBS | SYSTOLIC BLOOD PRESSURE: 134 MMHG | BODY MASS INDEX: 38.7 KG/M2 | RESPIRATION RATE: 16 BRPM

## 2021-09-23 DIAGNOSIS — Z83.49 FAMILY HISTORY OF HEMOCHROMATOSIS: ICD-10-CM

## 2021-09-23 DIAGNOSIS — E03.9 HYPOTHYROIDISM, UNSPECIFIED TYPE: ICD-10-CM

## 2021-09-23 DIAGNOSIS — M72.2 PLANTAR FASCIITIS: ICD-10-CM

## 2021-09-23 DIAGNOSIS — F33.1 MAJOR DEPRESSIVE DISORDER, RECURRENT EPISODE, MODERATE (H): ICD-10-CM

## 2021-09-23 DIAGNOSIS — G44.219 FREQUENT EPISODIC TENSION-TYPE HEADACHE: ICD-10-CM

## 2021-09-23 DIAGNOSIS — G56.03 BILATERAL CARPAL TUNNEL SYNDROME: ICD-10-CM

## 2021-09-23 DIAGNOSIS — Z00.00 ROUTINE GENERAL MEDICAL EXAMINATION AT A HEALTH CARE FACILITY: Primary | ICD-10-CM

## 2021-09-23 LAB
ANION GAP SERPL CALCULATED.3IONS-SCNC: 4 MMOL/L (ref 3–14)
BUN SERPL-MCNC: 14 MG/DL (ref 7–30)
CALCIUM SERPL-MCNC: 9.1 MG/DL (ref 8.5–10.1)
CHLORIDE BLD-SCNC: 109 MMOL/L (ref 94–109)
CO2 SERPL-SCNC: 27 MMOL/L (ref 20–32)
CREAT SERPL-MCNC: 0.88 MG/DL (ref 0.52–1.04)
ERYTHROCYTE [DISTWIDTH] IN BLOOD BY AUTOMATED COUNT: 12.1 % (ref 10–15)
FERRITIN SERPL-MCNC: 101 NG/ML (ref 12–150)
GFR SERPL CREATININE-BSD FRML MDRD: 84 ML/MIN/1.73M2
GLUCOSE BLD-MCNC: 93 MG/DL (ref 70–99)
HCT VFR BLD AUTO: 41.7 % (ref 35–47)
HGB BLD-MCNC: 14.7 G/DL (ref 11.7–15.7)
MCH RBC QN AUTO: 31.5 PG (ref 26.5–33)
MCHC RBC AUTO-ENTMCNC: 35.3 G/DL (ref 31.5–36.5)
MCV RBC AUTO: 89 FL (ref 78–100)
PLATELET # BLD AUTO: 276 10E3/UL (ref 150–450)
POTASSIUM BLD-SCNC: 4.1 MMOL/L (ref 3.4–5.3)
RBC # BLD AUTO: 4.67 10E6/UL (ref 3.8–5.2)
SODIUM SERPL-SCNC: 140 MMOL/L (ref 133–144)
TSH SERPL DL<=0.005 MIU/L-ACNC: 2.52 MU/L (ref 0.4–4)
WBC # BLD AUTO: 9.9 10E3/UL (ref 4–11)

## 2021-09-23 PROCEDURE — 80048 BASIC METABOLIC PNL TOTAL CA: CPT | Performed by: FAMILY MEDICINE

## 2021-09-23 PROCEDURE — 99213 OFFICE O/P EST LOW 20 MIN: CPT | Mod: 25 | Performed by: FAMILY MEDICINE

## 2021-09-23 PROCEDURE — 85027 COMPLETE CBC AUTOMATED: CPT | Performed by: FAMILY MEDICINE

## 2021-09-23 PROCEDURE — 36415 COLL VENOUS BLD VENIPUNCTURE: CPT | Performed by: FAMILY MEDICINE

## 2021-09-23 PROCEDURE — 99395 PREV VISIT EST AGE 18-39: CPT | Performed by: FAMILY MEDICINE

## 2021-09-23 PROCEDURE — 84443 ASSAY THYROID STIM HORMONE: CPT | Performed by: FAMILY MEDICINE

## 2021-09-23 PROCEDURE — 82728 ASSAY OF FERRITIN: CPT | Performed by: FAMILY MEDICINE

## 2021-09-23 ASSESSMENT — PATIENT HEALTH QUESTIONNAIRE - PHQ9: SUM OF ALL RESPONSES TO PHQ QUESTIONS 1-9: 9

## 2021-09-23 ASSESSMENT — ANXIETY QUESTIONNAIRES
6. BECOMING EASILY ANNOYED OR IRRITABLE: SEVERAL DAYS
5. BEING SO RESTLESS THAT IT IS HARD TO SIT STILL: NOT AT ALL
GAD7 TOTAL SCORE: 2
2. NOT BEING ABLE TO STOP OR CONTROL WORRYING: NOT AT ALL
IF YOU CHECKED OFF ANY PROBLEMS ON THIS QUESTIONNAIRE, HOW DIFFICULT HAVE THESE PROBLEMS MADE IT FOR YOU TO DO YOUR WORK, TAKE CARE OF THINGS AT HOME, OR GET ALONG WITH OTHER PEOPLE: NOT DIFFICULT AT ALL
7. FEELING AFRAID AS IF SOMETHING AWFUL MIGHT HAPPEN: NOT AT ALL
1. FEELING NERVOUS, ANXIOUS, OR ON EDGE: SEVERAL DAYS
4. TROUBLE RELAXING: NOT AT ALL
3. WORRYING TOO MUCH ABOUT DIFFERENT THINGS: NOT AT ALL

## 2021-09-23 ASSESSMENT — MIFFLIN-ST. JEOR: SCORE: 1639.66

## 2021-09-23 ASSESSMENT — PAIN SCALES - GENERAL: PAINLEVEL: NO PAIN (0)

## 2021-09-23 NOTE — NURSING NOTE
"Chief Complaint   Patient presents with     Physical       Initial /76 (BP Location: Right arm, Patient Position: Sitting, Cuff Size: Adult Regular)   Pulse 105   Temp 98.5  F (36.9  C) (Tympanic)   Resp 16   Ht 1.6 m (5' 2.99\")   Wt 99.1 kg (218 lb 6.4 oz)   SpO2 98%   BMI 38.70 kg/m   Estimated body mass index is 38.7 kg/m  as calculated from the following:    Height as of this encounter: 1.6 m (5' 2.99\").    Weight as of this encounter: 99.1 kg (218 lb 6.4 oz).  Medication Reconciliation: complete  Lulu Angeles MA  "

## 2021-09-24 ASSESSMENT — ANXIETY QUESTIONNAIRES: GAD7 TOTAL SCORE: 2

## 2021-09-27 DIAGNOSIS — F33.1 MAJOR DEPRESSIVE DISORDER, RECURRENT EPISODE, MODERATE (H): ICD-10-CM

## 2021-09-28 RX ORDER — SERTRALINE HYDROCHLORIDE 100 MG/1
TABLET, FILM COATED ORAL
Qty: 90 TABLET | Refills: 3 | Status: SHIPPED | OUTPATIENT
Start: 2021-09-28 | End: 2022-05-06

## 2021-09-28 NOTE — TELEPHONE ENCOUNTER
veronica      Last Written Prescription Date:  10/12/2020  Last Fill Quantity: 90,   # refills: 3  Last Office Visit: 9/23/21  Future Office visit:

## 2021-09-30 ENCOUNTER — HOSPITAL ENCOUNTER (OUTPATIENT)
Dept: CT IMAGING | Facility: HOSPITAL | Age: 41
End: 2021-09-30
Attending: FAMILY MEDICINE
Payer: COMMERCIAL

## 2021-09-30 ENCOUNTER — OFFICE VISIT (OUTPATIENT)
Dept: PODIATRY | Facility: OTHER | Age: 41
End: 2021-09-30
Attending: FAMILY MEDICINE
Payer: COMMERCIAL

## 2021-09-30 VITALS
OXYGEN SATURATION: 98 % | DIASTOLIC BLOOD PRESSURE: 78 MMHG | HEART RATE: 87 BPM | TEMPERATURE: 98.3 F | SYSTOLIC BLOOD PRESSURE: 128 MMHG

## 2021-09-30 DIAGNOSIS — M79.671 RIGHT FOOT PAIN: ICD-10-CM

## 2021-09-30 DIAGNOSIS — M21.41 PES PLANUS OF BOTH FEET: ICD-10-CM

## 2021-09-30 DIAGNOSIS — M24.271 LIGAMENTOUS LAXITY OF RIGHT ANKLE: ICD-10-CM

## 2021-09-30 DIAGNOSIS — M21.42 PES PLANUS OF BOTH FEET: ICD-10-CM

## 2021-09-30 DIAGNOSIS — G44.219 FREQUENT EPISODIC TENSION-TYPE HEADACHE: ICD-10-CM

## 2021-09-30 DIAGNOSIS — M72.2 PLANTAR FASCIITIS: Primary | ICD-10-CM

## 2021-09-30 PROCEDURE — 99203 OFFICE O/P NEW LOW 30 MIN: CPT | Mod: 25 | Performed by: PODIATRIST

## 2021-09-30 PROCEDURE — 70470 CT HEAD/BRAIN W/O & W/DYE: CPT

## 2021-09-30 PROCEDURE — 20550 NJX 1 TENDON SHEATH/LIGAMENT: CPT | Performed by: PODIATRIST

## 2021-09-30 PROCEDURE — 255N000002 HC RX 255 OP 636: Performed by: RADIOLOGY

## 2021-09-30 RX ORDER — DEXAMETHASONE SODIUM PHOSPHATE 4 MG/ML
4 INJECTION, SOLUTION INTRA-ARTICULAR; INTRALESIONAL; INTRAMUSCULAR; INTRAVENOUS; SOFT TISSUE ONCE
Status: COMPLETED | OUTPATIENT
Start: 2021-09-30 | End: 2021-09-30

## 2021-09-30 RX ORDER — TRIAMCINOLONE ACETONIDE 40 MG/ML
40 INJECTION, SUSPENSION INTRA-ARTICULAR; INTRAMUSCULAR ONCE
Status: COMPLETED | OUTPATIENT
Start: 2021-09-30 | End: 2021-09-30

## 2021-09-30 RX ORDER — IOPAMIDOL 612 MG/ML
100 INJECTION, SOLUTION INTRAVASCULAR ONCE
Status: COMPLETED | OUTPATIENT
Start: 2021-09-30 | End: 2021-09-30

## 2021-09-30 RX ADMIN — DEXAMETHASONE SODIUM PHOSPHATE 4 MG: 4 INJECTION, SOLUTION INTRA-ARTICULAR; INTRALESIONAL; INTRAMUSCULAR; INTRAVENOUS; SOFT TISSUE at 10:01

## 2021-09-30 RX ADMIN — TRIAMCINOLONE ACETONIDE 40 MG: 40 INJECTION, SUSPENSION INTRA-ARTICULAR; INTRAMUSCULAR at 10:02

## 2021-09-30 RX ADMIN — IOPAMIDOL 100 ML: 612 INJECTION, SOLUTION INTRAVENOUS at 08:34

## 2021-09-30 ASSESSMENT — PAIN SCALES - GENERAL: PAINLEVEL: NO PAIN (0)

## 2021-09-30 NOTE — PROGRESS NOTES
Chief complaint: Patient presents with:  Musculoskeletal Problem: Plantar fasciitis      History of Present Illness: This 41 year old female is seen at the request of [facility] for evaluation and suggestions of management of heel pain bilaterally. She says the RIGHT foot is more commonly painful than the LEFT. However, she has a h/o pain in the RIGHT foot. Pain started around a year ago when she was barefoot more throughout COVID (around the summer of ). She started wearing shoes around the house around the fall of . She wears tennis shoes around the house and sometimes she wears slippers. She wears Dr. Mccloud inserts but it doesn't seem to make a difference.    Since the pain started, it has been waxing and waning. The pain is pretty consistent and she has some sort of pain every day. She is doing some stretches and she has tried to avoid having to come to the doctor's office.    Secondly, patient complains of ankle rolling. She rolled her ankle at age 18 and she had a bad ankle sprain a couple years ago. She has had three bad ankle rolls and multiple ankle rolls in between. This has been occurring several times throughout the years.    No further pedal complaints today.     Patient does not use tobacco products.         /78 (BP Location: Left arm, Patient Position: Sitting, Cuff Size: Adult Regular)   Pulse 87   Temp 98.3  F (36.8  C) (Tympanic)   SpO2 98%     Patient Active Problem List   Diagnosis     PCOS (polycystic ovarian syndrome)     Hirsutism     Elevated testosterone level in female     Ovarian hyperthecosis     History of pre-term labor     Hypothyroidism     Infertility, anovulation     PVC's (premature ventricular contractions)     H/O premature delivery     Bilateral carpal tunnel syndrome      (spontaneous vaginal delivery)     ACP (advance care planning)     Major depressive disorder, recurrent episode, moderate (H)     Gynecomastia, female     High risk HPV infection      Papanicolaou smear of cervix with low grade squamous intraepithelial lesion (LGSIL)     Encounter for other general counseling or advice on contraception       Past Surgical History:   Procedure Laterality Date     dental surgical procedure       LAPAROSCOPIC TUBAL DYE STUDY N/A 9/3/2014    Procedure: LAPAROSCOPIC TUBAL DYE STUDY;  Surgeon: Amanuel Purcell MD;  Location: [facility]     MAMMOPLASTY REDUCTION  10/19       Current Outpatient Medications   Medication     buPROPion (WELLBUTRIN XL) 150 MG 24 hr tablet     buPROPion (WELLBUTRIN XL) 300 MG 24 hr tablet     norethindrone-ethinyl estradiol (MICROGESTIN 1/20) 1-20 MG-MCG tablet     sertraline (ZOLOFT) 100 MG tablet     SYNTHROID 100 MCG tablet     No current facility-administered medications for this visit.          Allergies   Allergen Reactions     Macrolides Hives     Macrolide antibiotics     Penicillins Hives     Sulfonamide Derivatives Hives     Sulfa       Family History   Problem Relation Age of Onset     Lipids Father         hyperlipidemia     Hypertension Father      Lipids Mother         hyperlipidemia     Lupus Mother         erythematosus     Diabetes Other      Thyroid Disease Other      Asthma No family hx of        Social History     Socioeconomic History     Marital status:      Spouse name: None     Number of children: None     Years of education: None     Highest education level: None   Occupational History     None   Tobacco Use     Smoking status: Never Smoker     Smokeless tobacco: Never Used   Substance and Sexual Activity     Alcohol use: No     Drug use: No     Sexual activity: Yes     Partners: Male   Other Topics Concern     Parent/sibling w/ CABG, MI or angioplasty before 65F 55M? No   Social History Narrative     None     Social Determinants of Health     Financial Resource Strain:      Difficulty of Paying Living Expenses:    Food Insecurity:      Worried About Running Out of Food in the Last Year:      Ran Out of Food in the  Last Year:    Transportation Needs:      Lack of Transportation (Medical):      Lack of Transportation (Non-Medical):    Physical Activity:      Days of Exercise per Week:      Minutes of Exercise per Session:    Stress:      Feeling of Stress :    Social Connections:      Frequency of Communication with Friends and Family:      Frequency of Social Gatherings with Friends and Family:      Attends [employer] Services:      Active Member of Clubs or Organizations:      Attends Club or Organization Meetings:      Marital Status:    Intimate Partner Violence:      Fear of Current or Ex-Partner:      Emotionally Abused:      Physically Abused:      Sexually Abused:        ROS: 10 point ROS neg other than the symptoms noted above in the HPI.  EXAM  Constitutional: healthy, alert and no distress    Psychiatric: mentation appears normal and affect normal/bright    VASCULAR:  -Dorsalis pedis pulse +2/4 b/l  -Posterior tibial pulse +2/4 b/l  -Capillary refill time < 3 seconds to b/l hallux  NEURO:  -Light touch sensation intact to b/l plantar forefoot  DERM:  -Skin temperature, texture and turgor WNL b/l  -Toenails elongated, thickened, dystrophic and discolored x 10  MSK:  -Pain on palpation to bilateral medial tubercle of the calcaneus (RIGHT > LEFT)  -Muscle strength of ankles +5/5 for dorsiflexion, plantarflexion, ABDUction and ADDuction b/l  -Ankle joint passive ROM within normal limits except for dorsiflexion:    Dorsiflexion, RIGHT Straight knee 0 degrees    Dorsiflexion, LEFT Straight knee 0 degrees  -Mild laxity to the RIGHT ankle with mildly increased inversion of RIGHT ankle compared to LEFT   -Mild decrease in arch height while patient is NWB  ============================================================    ASSESSMENT:  (M72.2) Plantar fasciitis  (primary encounter diagnosis)    (M79.671) Right foot pain    (M24.271) Ligamentous laxity of right ankle    (M21.41,  M21.42) Pes planus of both feet      PLAN:  -Patient  evaluated and examined. Treatment options discussed with no educational barriers noted.  -Discussed plantar fascia pain including potential etiologies and treatment options. Patient's pain was likely started due to a combination of factors that can include but are not limited to worn or improper shoe gear, sudden change in shoe gear, change in activity, imbalanced biomechanics (such as the patient's bilateral equinus deformity of the calf muscles).  ---Surgery can be considered if conservative treatment options are fully exhausted and are not decreasing the pain, but conservative measures usually resolve this pain with time.  ---Discussed conservative treatment options including compression socks, icing, elevating, resting, injections, PT, change in shoe gear (including proper shoe gear around the house), night splints. At this time, patient would like to proceed with the below treatment options.    -Stability Shoe Gear: This involves wearing a solid tennis shoe that bends at the toe, but has a solid midfoot portion of the shoe that does not bend or twist in half, and a rigid heel contour.   -----Betancourt, Asics, and New Balance are a few brands that have several types of stability tennis shoes. However, these brands also carry lightweight shoes that do not meet the above criteria, so look for a stability tennis shoe.  -----Betancourt tend to have a wider toe box if you have difficulty finding wide enough shoes for your feet.   -----Any brand of can be worn as long as it meets the above three criteria.  -Compression socks: Advised to wear compression socks all day (especially when working) to decrease LOWER EXTREMITY swelling in both feet and legs. Apply the socks first thing in the morning before getting out of bed and remove them at night when the feet are elevated in bed.  -Stretching: Stretch the calf muscles to increase flexibility of the calf muscles. If possible, aim to stretch the calf muscles for a combined total  of one hour per day.  -Icing: Ice the painful area of the foot minimally once a day for ten minutes per foot (can be a frozen water bottle if pain is on the bottom surface of the foot). Ice after any extended amount of time on your feet.  -Consider supportive sandals for around the house (such as Jimbo, Vionic, or Oofos sandals)    -Custom inserts will be ordered today -- you will be called to make an appointment  ---Jeffrey Randolph is an orthotist at [facility] that takes appointments on Thursdays. He could assist you in good shoes and over-the-counter orthotics.   ---There are several orthotists in the area that fit for custom orthotics. Call if you are considering this and a referral can be placed.    -Consider physical therapy if the RIGHT ankle continues to be weak / continue to roll and/or if the plantar fascia pain persists.    -Injection x 1 to the RIGHT medial heel: Obtained written and verbal consent from patient for a steroid injection into the medial heel of the Right  foot. Reviewed risks and benefits of the injection. Informed patient that the injection may decrease pain long-term, short-term, or not at all. Patient in agreement with an injection today in an effort to slow or reverse the chronic inflammatory process and pain in the foot.   ---Patient signed informed consent and a time-out was performed and there was verification of correct patient, procedure and laterality.  ---Prepped the injection site with an alcohol swab.  ---Injected a total of 3 mL of 1:1:1 of 4mg Dexamethasone, 40m g Kenalog, and 1mL of 2% Lidocaine plain. Patient tolerated the injection well.    -----------------------------------------------  -RIGHT ankle laxity: Patient has a h/o ankle sprains. This will stretch out the lateral ankle ligaments and place her at higher risk for ankle instability and future sprains.  ---Conservatively, patient may consider an ankle brace +/- physical therapy to strengthen the ankle. An orthotic may  provide more stability since she does have a mild decrease in arch height.  ---Surgically, she may consider a ligamentous ankle stabilization surgery if her ankle continues to feel unstable.  ---At this time, patient will consider orthotics first and she will call if the ankle laxity worsens.    -Patient in agreement with the above treatment plan and all of patient's questions were answered.      RTC 2 1/2 months in [facility] for RIGHT plantar fascia pain and RIGHT ankle instability        Nia Victoria DPM

## 2021-09-30 NOTE — PATIENT INSTRUCTIONS
-Stability Shoe Gear: This involves wearing a solid tennis shoe that bends at the toe, but has a solid midfoot portion of the shoe that does not bend or twist in half, and a rigid heel contour.   -----Betancourt, Asics, and New Balance are a few brands that have several types of stability tennis shoes. However, these brands also carry lightweight shoes that do not meet the above criteria, so look for a stability tennis shoe.  -----Betancourt tend to have a wider toe box if you have difficulty finding wide enough shoes for your feet.   -----Any brand of can be worn as long as it meets the above three criteria.  -Compression socks: Advised to wear compression socks all day (especially when working) to decrease LOWER EXTREMITY swelling in both feet and legs. Apply the socks first thing in the morning before getting out of bed and remove them at night when the feet are elevated in bed.  -Stretching: Stretch the calf muscles to increase flexibility of the calf muscles. If possible, aim to stretch the calf muscles for a combined total of one hour per day.  -Icing: Ice the painful area of the foot minimally once a day for ten minutes per foot (can be a frozen water bottle if pain is on the bottom surface of the foot). Ice after any extended amount of time on your feet.  -Consider supportive sandals for around the house (such as Jimbo, Vionic, or Oofos sandals)    -Custom inserts will be ordered today -- you will be called to make an appointment  ---Jeffrey Randolph is an orthotist at [facility] that takes appointments on Thursdays. He could assist you in good shoes and over-the-counter orthotics.   ---There are several orthotists in the area that fit for custom orthotics. Call if you are considering this and a referral can be placed.    -Consider physical therapy if the RIGHT ankle continues to be weak / continue to roll and/or if the plantar fascia pain persists.

## 2021-09-30 NOTE — LETTER
9/30/2021         RE: Etta Obrien  [address] [facility] [id number]        Dear Colleague,    Thank you for referring your patient, Etta Obrien, to the [facility]. Please see a copy of my visit note below.    Chief complaint: Patient presents with:  Musculoskeletal Problem: Plantar fasciitis      History of Present Illness: This 41 year old female is seen at the request of [facility] for evaluation and suggestions of management of heel pain bilaterally. She says the RIGHT foot is more commonly painful than the LEFT. However, she has a h/o pain in the RIGHT foot. Pain started around a year ago when she was barefoot more throughout COVID (around the summer of 2020). She started wearing shoes around the house around the fall of 2021. She wears tennis shoes around the house and sometimes she wears slippers. She wears Dr. Mccloud inserts but it doesn't seem to make a difference.    Since the pain started, it has been waxing and waning. The pain is pretty consistent and she has some sort of pain every day. She is doing some stretches and she has tried to avoid having to come to the doctor's office.    Secondly, patient complains of ankle rolling. She rolled her ankle at age 18 and she had a bad ankle sprain a couple years ago. She has had three bad ankle rolls and multiple ankle rolls in between. This has been occurring several times throughout the years.    No further pedal complaints today.     Patient does not use tobacco products.         /78 (BP Location: Left arm, Patient Position: Sitting, Cuff Size: Adult Regular)   Pulse 87   Temp 98.3  F (36.8  C) (Tympanic)   SpO2 98%     Patient Active Problem List   Diagnosis     PCOS (polycystic ovarian syndrome)     Hirsutism     Elevated testosterone level in female     Ovarian hyperthecosis     History of pre-term labor     Hypothyroidism     Infertility, anovulation     PVC's (premature ventricular contractions)     H/O premature  delivery     Bilateral carpal tunnel syndrome      (spontaneous vaginal delivery)     ACP (advance care planning)     Major depressive disorder, recurrent episode, moderate (H)     Gynecomastia, female     High risk HPV infection     Papanicolaou smear of cervix with low grade squamous intraepithelial lesion (LGSIL)     Encounter for other general counseling or advice on contraception       Past Surgical History:   Procedure Laterality Date     dental surgical procedure       LAPAROSCOPIC TUBAL DYE STUDY N/A 9/3/2014    Procedure: LAPAROSCOPIC TUBAL DYE STUDY;  Surgeon: Amanuel Purcell MD;  Location: [facility]     MAMMOPLASTY REDUCTION  10/19       Current Outpatient Medications   Medication     buPROPion (WELLBUTRIN XL) 150 MG 24 hr tablet     buPROPion (WELLBUTRIN XL) 300 MG 24 hr tablet     norethindrone-ethinyl estradiol (MICROGESTIN ) 1-20 MG-MCG tablet     sertraline (ZOLOFT) 100 MG tablet     SYNTHROID 100 MCG tablet     No current facility-administered medications for this visit.          Allergies   Allergen Reactions     Macrolides Hives     Macrolide antibiotics     Penicillins Hives     Sulfonamide Derivatives Hives     Sulfa       Family History   Problem Relation Age of Onset     Lipids Father         hyperlipidemia     Hypertension Father      Lipids Mother         hyperlipidemia     Lupus Mother         erythematosus     Diabetes Other      Thyroid Disease Other      Asthma No family hx of        Social History     Socioeconomic History     Marital status:      Spouse name: None     Number of children: None     Years of education: None     Highest education level: None   Occupational History     None   Tobacco Use     Smoking status: Never Smoker     Smokeless tobacco: Never Used   Substance and Sexual Activity     Alcohol use: No     Drug use: No     Sexual activity: Yes     Partners: Male   Other Topics Concern     Parent/sibling w/ CABG, MI or angioplasty before 65F 55M? No   Social  History Narrative     None     Social Determinants of Health     Financial Resource Strain:      Difficulty of Paying Living Expenses:    Food Insecurity:      Worried About Running Out of Food in the Last Year:      Ran Out of Food in the Last Year:    Transportation Needs:      Lack of Transportation (Medical):      Lack of Transportation (Non-Medical):    Physical Activity:      Days of Exercise per Week:      Minutes of Exercise per Session:    Stress:      Feeling of Stress :    Social Connections:      Frequency of Communication with Friends and Family:      Frequency of Social Gatherings with Friends and Family:      Attends [employer] Services:      Active Member of Clubs or Organizations:      Attends Club or Organization Meetings:      Marital Status:    Intimate Partner Violence:      Fear of Current or Ex-Partner:      Emotionally Abused:      Physically Abused:      Sexually Abused:        ROS: 10 point ROS neg other than the symptoms noted above in the HPI.  EXAM  Constitutional: healthy, alert and no distress    Psychiatric: mentation appears normal and affect normal/bright    VASCULAR:  -Dorsalis pedis pulse +2/4 b/l  -Posterior tibial pulse +2/4 b/l  -Capillary refill time < 3 seconds to b/l hallux  NEURO:  -Light touch sensation intact to b/l plantar forefoot  DERM:  -Skin temperature, texture and turgor WNL b/l  -Toenails elongated, thickened, dystrophic and discolored x 10  MSK:  -Pain on palpation to bilateral medial tubercle of the calcaneus (RIGHT > LEFT)  -Muscle strength of ankles +5/5 for dorsiflexion, plantarflexion, ABDUction and ADDuction b/l  -Ankle joint passive ROM within normal limits except for dorsiflexion:    Dorsiflexion, RIGHT Straight knee 0 degrees    Dorsiflexion, LEFT Straight knee 0 degrees  -Mild laxity to the RIGHT ankle with mildly increased inversion of RIGHT ankle compared to LEFT   -Mild decrease in arch height while patient is  NWB  ============================================================    ASSESSMENT:  (M72.2) Plantar fasciitis  (primary encounter diagnosis)    (M79.671) Right foot pain    (M24.271) Ligamentous laxity of right ankle    (M21.41,  M21.42) Pes planus of both feet      PLAN:  -Patient evaluated and examined. Treatment options discussed with no educational barriers noted.  -Discussed plantar fascia pain including potential etiologies and treatment options. Patient's pain was likely started due to a combination of factors that can include but are not limited to worn or improper shoe gear, sudden change in shoe gear, change in activity, imbalanced biomechanics (such as the patient's bilateral equinus deformity of the calf muscles).  ---Surgery can be considered if conservative treatment options are fully exhausted and are not decreasing the pain, but conservative measures usually resolve this pain with time.  ---Discussed conservative treatment options including compression socks, icing, elevating, resting, injections, PT, change in shoe gear (including proper shoe gear around the house), night splints. At this time, patient would like to proceed with the below treatment options.    -Stability Shoe Gear: This involves wearing a solid tennis shoe that bends at the toe, but has a solid midfoot portion of the shoe that does not bend or twist in half, and a rigid heel contour.   -----Betancourt, Asics, and New Balance are a few brands that have several types of stability tennis shoes. However, these brands also carry lightweight shoes that do not meet the above criteria, so look for a stability tennis shoe.  -----Betancourt tend to have a wider toe box if you have difficulty finding wide enough shoes for your feet.   -----Any brand of can be worn as long as it meets the above three criteria.  -Compression socks: Advised to wear compression socks all day (especially when working) to decrease LOWER EXTREMITY swelling in both feet and  legs. Apply the socks first thing in the morning before getting out of bed and remove them at night when the feet are elevated in bed.  -Stretching: Stretch the calf muscles to increase flexibility of the calf muscles. If possible, aim to stretch the calf muscles for a combined total of one hour per day.  -Icing: Ice the painful area of the foot minimally once a day for ten minutes per foot (can be a frozen water bottle if pain is on the bottom surface of the foot). Ice after any extended amount of time on your feet.  -Consider supportive sandals for around the house (such as Jimbo, Vionic, or Oofos sandals)    -Custom inserts will be ordered today -- you will be called to make an appointment  ---Jeffrey Randolph is an orthotist at [facility] that takes appointments on Thursdays. He could assist you in good shoes and over-the-counter orthotics.   ---There are several orthotists in the area that fit for custom orthotics. Call if you are considering this and a referral can be placed.    -Consider physical therapy if the RIGHT ankle continues to be weak / continue to roll and/or if the plantar fascia pain persists.    -Injection x 1 to the RIGHT medial heel: Obtained written and verbal consent from patient for a steroid injection into the medial heel of the Right  foot. Reviewed risks and benefits of the injection. Informed patient that the injection may decrease pain long-term, short-term, or not at all. Patient in agreement with an injection today in an effort to slow or reverse the chronic inflammatory process and pain in the foot.   ---Patient signed informed consent and a time-out was performed and there was verification of correct patient, procedure and laterality.  ---Prepped the injection site with an alcohol swab.  ---Injected a total of 3 mL of 1:1:1 of 4mg Dexamethasone, 40m g Kenalog, and 1mL of 2% Lidocaine plain. Patient tolerated the injection well.    -----------------------------------------------  -RIGHT  ankle laxity: Patient has a h/o ankle sprains. This will stretch out the lateral ankle ligaments and place her at higher risk for ankle instability and future sprains.  ---Conservatively, patient may consider an ankle brace +/- physical therapy to strengthen the ankle. An orthotic may provide more stability since she does have a mild decrease in arch height.  ---Surgically, she may consider a ligamentous ankle stabilization surgery if her ankle continues to feel unstable.  ---At this time, patient will consider orthotics first and she will call if the ankle laxity worsens.    -Patient in agreement with the above treatment plan and all of patient's questions were answered.      RTC 2 1/2 months in [facility] for RIGHT plantar fascia pain and RIGHT ankle instability        Nia Victoria DPM      Again, thank you for allowing me to participate in the care of your patient.        Sincerely,        Nia Victoria DPM

## 2021-09-30 NOTE — NURSING NOTE
"Chief Complaint   Patient presents with     Musculoskeletal Problem     Plantar fasciitis       Initial /78 (BP Location: Left arm, Patient Position: Sitting, Cuff Size: Adult Regular)   Pulse 87   Temp 98.3  F (36.8  C) (Tympanic)   SpO2 98%  Estimated body mass index is 38.7 kg/m  as calculated from the following:    Height as of 9/23/21: 1.6 m (5' 2.99\").    Weight as of 9/23/21: 99.1 kg (218 lb 6.4 oz).  Medication Reconciliation: luz Alonso  "

## 2021-10-03 ENCOUNTER — HEALTH MAINTENANCE LETTER (OUTPATIENT)
Age: 41
End: 2021-10-03

## 2021-10-14 ENCOUNTER — E-VISIT (OUTPATIENT)
Dept: FAMILY MEDICINE | Facility: OTHER | Age: 41
End: 2021-10-14
Attending: FAMILY MEDICINE
Payer: COMMERCIAL

## 2021-10-14 DIAGNOSIS — J06.9 VIRAL URI WITH COUGH: Primary | ICD-10-CM

## 2021-10-14 PROCEDURE — 99421 OL DIG E/M SVC 5-10 MIN: CPT | Performed by: FAMILY MEDICINE

## 2021-10-14 RX ORDER — ALBUTEROL SULFATE 90 UG/1
1-2 AEROSOL, METERED RESPIRATORY (INHALATION) EVERY 4 HOURS PRN
Qty: 18 G | Refills: 0 | Status: SHIPPED | OUTPATIENT
Start: 2021-10-14 | End: 2024-08-19

## 2021-10-14 NOTE — TELEPHONE ENCOUNTER
Provider E-Visit time total (minutes): 8    ELECTRONIC VISIT DOCUMENTATION:    SUBJECTIVE:  Note above accurately captures the substance of my conversation with the patient.    ASSESSMENT/ PLAN:  1. Viral URI with cough  Will send an inhaler to help with symptoms.  Will also set up COVID testing.  Follow-up if symptoms aren't improving.      Ashley Allen MD

## 2021-10-14 NOTE — PATIENT INSTRUCTIONS
"  Dear Etta Obrien    After reviewing your responses, I've been able to diagnose you with \"Bronchitis\" which is a common infection of your lungs that is nearly always caused by a virus. The virus causes swelling and irritation of the air passages of your lungs which leads to cough. The illness spreads from your nose and throat to your windpipe and airways. It is often called a \"chest cold\" and can last up to 2 weeks, but is not a serious illness. Exposure to cigarette smoke usually makes this significantly worse.      To treat bronchitis, the main thing to do is drink lots of fluids and rest. Cough medications over-the-counter such as mucinex, robitussin or \"cold and sinus\" medications can be helpful. Ibuprofen and Tylenol also help with fevers or aching feelings that you often have with this kind of illness. Do not take ibuprofen if you have kidney disease, stomach ulcers or allergy to aspirin.     Bronchitis is most often highly contagious as viruses are spread through the air or touch. Avoid contact with others who may become infected, particularly children, the elderly and those whose immune systems might be weak.     I will send in an inhaler to help with your cough as well.    That being said, I do understand that in the day and age of COVID, it gets difficult to know what to do.  I would recommend scheduling you for testing just to know.  It's not that all coughs are COVID, but it's nice to know that the cough isn't COVID.  I will have someone call to get you set up for testing.    If your symptoms worsen, you develop chest pain or shortness of breath, fevers over 101, or are not improving in 5 days, please contact your primary care provider for an appointment or visit any of our convenient Walk-in Care or Urgent Care Centers to be seen which can be found on our website here.    Thanks again for choosing us as your health care partner,    Ashley Allen MD  "

## 2021-10-15 RX ORDER — BUPROPION HYDROCHLORIDE 150 MG/1
TABLET ORAL
Qty: 90 TABLET | Refills: 3 | Status: SHIPPED | OUTPATIENT
Start: 2021-10-15 | End: 2022-10-07

## 2021-10-15 RX ORDER — BUPROPION HYDROCHLORIDE 300 MG/1
TABLET ORAL
Qty: 90 TABLET | Refills: 3 | Status: SHIPPED | OUTPATIENT
Start: 2021-10-15 | End: 2022-11-28

## 2021-10-15 NOTE — TELEPHONE ENCOUNTER
wellbutrin 150      Last Written Prescription Date:  7/15/21  Last Fill Quantity: 90,   # refills: 0  Last Office Visit: 9/23/21  Future Office visit:    Next 5 appointments (look out 90 days)    Dec 15, 2021  9:30 AM  (Arrive by 9:15 AM)  Return Visit with Nia Victoria DPM  [facility] ([facility] ) [address] [id number] [phone]         wellbutrin   300   Last Written Prescription Date:  7/15/21  Last Fill Quantity: 90,   # refills: 0  Last Office Visit: 9/23/21  Future Office visit:    Next 5 appointments (look out 90 days)    Dec 15, 2021  9:30 AM  (Arrive by 9:15 AM)  Return Visit with Nia iVctoria DPM  [facility] ([facility] ) [address] [id number] [phone]

## 2021-10-18 ENCOUNTER — E-VISIT (OUTPATIENT)
Dept: FAMILY MEDICINE | Facility: OTHER | Age: 41
End: 2021-10-18
Attending: FAMILY MEDICINE
Payer: COMMERCIAL

## 2021-10-18 DIAGNOSIS — J01.90 ACUTE SINUSITIS WITH SYMPTOMS > 10 DAYS: Primary | ICD-10-CM

## 2021-10-18 PROCEDURE — 99421 OL DIG E/M SVC 5-10 MIN: CPT | Performed by: FAMILY MEDICINE

## 2021-10-18 RX ORDER — CEPHALEXIN 500 MG/1
500 CAPSULE ORAL 3 TIMES DAILY
Qty: 30 CAPSULE | Refills: 0 | Status: SHIPPED | OUTPATIENT
Start: 2021-10-18 | End: 2022-01-18

## 2021-10-18 NOTE — TELEPHONE ENCOUNTER
Provider E-Visit time total (minutes): 7    ELECTRONIC VISIT DOCUMENTATION:    SUBJECTIVE:  Note above accurately captures the substance of my conversation with the patient.    ASSESSMENT/ PLAN:  1. Acute sinusitis with symptoms > 10 days  Keflex prescribed (overall symptoms have been present for more than 10 days).  Handout with symptomatic cares shared.  Follow-up if no improvement noted.  - cephALEXin (KEFLEX) 500 MG capsule; Take 1 capsule (500 mg) by mouth 3 times daily for 10 days  Dispense: 30 capsule; Refill: 0      Ashley Allen MD

## 2021-10-18 NOTE — PATIENT INSTRUCTIONS
Dear Etta Obrien    After reviewing your responses, I've been able to diagnose you with?a sinus infection caused by bacteria.?     Based on your responses and diagnosis, I have prescribed Keflex to treat your symptoms. I have sent this to your pharmacy.?     It is also important to stay well hydrated, get lots of rest and take over-the-counter decongestants,?tylenol?or ibuprofen if you?are able to?take those medications per your primary care provider to help relieve discomfort.?     It is important that you take?all of?your prescribed medication even if your symptoms are improving after a few doses.? Taking?all of?your medicine helps prevent the symptoms from returning.?     If your symptoms worsen, you develop severe headache, vomiting, high fever (>102), or are not improving in 7 days, please contact your primary care provider for an appointment or visit any of our convenient Walk-in Care or Urgent Care Centers to be seen which can be found on our website?here.?     Thanks again for choosing?us?as your health care partner,?   ?  Ashley Allen MD?     Sinusitis (Antibiotic Treatment)    The sinuses are air-filled spaces within the bones of the face. They connect to the inside of the nose. Sinusitis is an inflammation of the tissue that lines the sinuses. Sinusitis can occur during a cold. It can also happen due to allergies to pollens and other particles in the air. Sinusitis can cause symptoms of sinus congestion and a feeling of fullness. A sinus infection causes fever, headache, and facial pain. There is often green or yellow fluid draining from the nose or into the back of the throat (post-nasal drip). You have been given antibiotics to treat this condition.   Home care    Take the full course of antibiotics as instructed. Don't stop taking them, even when you feel better.    Drink plenty of water, hot tea, and other liquids as directed by the healthcare provider. This may help thin nasal mucus. It  also may help your sinuses drain fluids.    Heat may help soothe painful areas of your face. Use a towel soaked in hot water. Or,  the shower and direct the warm spray onto your face. Using a vaporizer along with a menthol rub at night may also help soothe symptoms.     An expectorant with guaifenesin may help thin nasal mucus and help your sinuses drain fluids. Talk with your provider or pharmacists before taking an over-the-counter (OTC) medicine if you have any questions about it or its side effects..    You can use an OTC decongestant, unless a similar medicine was prescribed to you. Nasal sprays work the fastest. Use one that contains phenylephrine or oxymetazoline. First blow your nose gently. Then use the spray. Don't use these medicines more often than directed on the label. If you do, your symptoms may get worse. You may also take pills that contain pseudoephedrine. Don t use products that combine multiple medicines. This is because side effects may be increased. Read labels. You can also ask the pharmacist for help. (People with high blood pressure should not use decongestants. They can raise blood pressure.) Talk with your provider or pharmacist if you have any questions about the medicine..    OTC antihistamines may help if allergies contributed to your sinusitis. Talk with your provider or pharmacist if you have any questions about the medicine..    Don't use nasal rinses or irrigation during an acute sinus infection, unless your healthcare provider tells you to. Rinsing may spread the infection to other areas in your sinuses.    Use acetaminophen or ibuprofen to control pain, unless another pain medicine was prescribed to you. If you have chronic liver or kidney disease or ever had a stomach ulcer, talk with your healthcare provider before using these medicines. Never give aspirin to anyone under age 18 who is ill with a fever. It may cause severe liver damage.    Don't smoke. This can make  symptoms worse.    Follow-up care  Follow up with your healthcare provider, or as advised.   When to seek medical advice  Call your healthcare provider if any of these occur:     Facial pain or headache that gets worse    Stiff neck    Unusual drowsiness or confusion    Swelling of your forehead or eyelids    Symptoms don't go away in 10 days    Vision problems, such as blurred or double vision    Fever of 100.4 F (38 C) or higher, or as directed by your healthcare provider  Call 911  Call 911 if any of these occur:     Seizure    Trouble breathing    Feeling dizzy or faint    Fingernails, skin or lips look blue, purple , or gray  Prevention  Here are steps you can take to help prevent an infection:     Keep good hand washing habits.    Don t have close contact with people who have sore throats, colds, or other upper respiratory infections.    Don t smoke, and stay away from secondhand smoke.    Stay up to date with of your vaccines.  [employer] last reviewed this educational content on 12/1/2019 2000-2021 [employer]. All rights reserved. This information is not intended as a substitute for professional medical care. Always follow your healthcare professional's instructions.

## 2021-10-27 ENCOUNTER — OFFICE VISIT (OUTPATIENT)
Dept: ORTHOPEDICS | Facility: OTHER | Age: 41
End: 2021-10-27
Attending: FAMILY MEDICINE
Payer: COMMERCIAL

## 2021-10-27 VITALS
RESPIRATION RATE: 16 BRPM | TEMPERATURE: 97.3 F | BODY MASS INDEX: 38.62 KG/M2 | OXYGEN SATURATION: 98 % | HEART RATE: 102 BPM | SYSTOLIC BLOOD PRESSURE: 110 MMHG | WEIGHT: 218 LBS | HEIGHT: 63 IN | DIASTOLIC BLOOD PRESSURE: 78 MMHG

## 2021-10-27 DIAGNOSIS — G56.03 BILATERAL CARPAL TUNNEL SYNDROME: ICD-10-CM

## 2021-10-27 PROCEDURE — 99203 OFFICE O/P NEW LOW 30 MIN: CPT | Performed by: SPECIALIST

## 2021-10-27 ASSESSMENT — MIFFLIN-ST. JEOR: SCORE: 1632.97

## 2021-10-27 ASSESSMENT — PAIN SCALES - GENERAL: PAINLEVEL: MODERATE PAIN (5)

## 2021-10-27 NOTE — PROGRESS NOTES
Visit Date: 10/27/2021    HISTORY OF PRESENT ILLNESS:  The patient is a 41-year-old left-hand dominant female seen today for evaluation of carpal tunnel syndrome on her left hand.  She has bilateral symptoms.  The left side is much worse.  She has had symptoms for over 10 years.  She describes numbness and tingling and sometimes a burning sensation in the thumb, index, long and radial half of the ring finger.  She has been told in the past that she has carpal tunnel syndrome.  She has treated this conservatively over the timeframe that she has been symptomatic.  Most recently, she has been wearing braces at night and it does help some, but the symptoms have become more consistent on her left hand to the point where now she feels like she is losing some strength compared to the right.  She comes in today with symptoms as described above.    PHYSICAL EXAMINATION:  On today's exam, the patient is awake, alert, oriented, no acute distress.  Overall, general mood and affect appearance are normal.  Vital signs are stable.  Height 63 inches, weight 218 pounds.  On evaluation of the left hand, patient has no obvious swelling, discoloration, deformity, or effusions.  There is no increased warmth or redness.  There is no atrophy.  She has good strength in her hand intrinsics and thenar musculature.  She does have decreased light touch sensation in the thumb, index, long and radial half of the ring fingers.  She has a positive Tinel's over the carpal tunnel and a positive carpal tunnel compression test.  She has full range of motion of her hand, wrist, fingers and elbow.  Negative Tinel's at the elbow.    ASSESSMENT AND PLAN:  The patient's symptoms, physical exam findings and history are very consistent with carpal tunnel syndrome.  I think the patient does have carpal tunnel syndrome.  I would recommend that she have surgery for carpal tunnel release.  We discussed this at length.  In this case, I do not think an EMG study  is necessary as her symptoms are very classic and her physical exam findings confirm carpal tunnel syndrome.  I recommended a carpal tunnel release.  I explained the surgery to the patient along with potential risks and complications, recovery time, etc.  She would like to proceed.  We will set this up as an outpatient to be done at the patient's convenience.  My  will contact the patient for scheduling.     About 20 minutes spent with the patient.    [place] Al MD        D: 10/27/2021   T: 10/27/2021   MT: TATO    Name:     JUDITH ROYIron  MRN:      [id number]        Account:    [id number]   :      1980           Visit Date: 10/27/2021     Document: [id number]

## 2021-10-27 NOTE — NURSING NOTE
"Chief Complaint   Patient presents with     Consult     Musculoskeletal Problem     CTS Bilateral       Initial /78   Pulse 102   Temp 97.3  F (36.3  C) (Tympanic)   Resp 16   Ht 1.6 m (5' 3\")   Wt 98.9 kg (218 lb)   SpO2 98%   BMI 38.62 kg/m   Estimated body mass index is 38.62 kg/m  as calculated from the following:    Height as of this encounter: 1.6 m (5' 3\").    Weight as of this encounter: 98.9 kg (218 lb).  Medication Reconciliation: complete  Winsome Lawrence LPN    "

## 2021-11-09 DIAGNOSIS — Z30.09 FAMILY PLANNING: ICD-10-CM

## 2021-11-09 RX ORDER — NORETHINDRONE ACETATE AND ETHINYL ESTRADIOL .02; 1 MG/1; MG/1
TABLET ORAL
Qty: 84 TABLET | Refills: 3 | Status: SHIPPED | OUTPATIENT
Start: 2021-11-09 | End: 2022-11-28

## 2021-11-09 NOTE — TELEPHONE ENCOUNTER
Microgestin      Last Written Prescription Date:  08/20/21  Last Fill Quantity: 84,   # refills: 0  Last Office Visit: 10/14/21  Future Office visit:    Next 5 appointments (look out 90 days)    Dec 15, 2021  9:45 AM  (Arrive by 9:30 AM)  Return Visit with Nia Victoria DPM  [facility] ([facility] ) [address] [id number] [phone]

## 2021-12-15 ENCOUNTER — OFFICE VISIT (OUTPATIENT)
Dept: PODIATRY | Facility: OTHER | Age: 41
End: 2021-12-15
Attending: PODIATRIST
Payer: COMMERCIAL

## 2021-12-15 VITALS
DIASTOLIC BLOOD PRESSURE: 85 MMHG | TEMPERATURE: 98.2 F | HEART RATE: 88 BPM | OXYGEN SATURATION: 96 % | SYSTOLIC BLOOD PRESSURE: 125 MMHG

## 2021-12-15 DIAGNOSIS — M79.671 RIGHT FOOT PAIN: ICD-10-CM

## 2021-12-15 DIAGNOSIS — M77.41 METATARSALGIA OF RIGHT FOOT: ICD-10-CM

## 2021-12-15 DIAGNOSIS — M72.2 PLANTAR FASCIITIS: Primary | ICD-10-CM

## 2021-12-15 DIAGNOSIS — M24.271 LIGAMENTOUS LAXITY OF RIGHT ANKLE: ICD-10-CM

## 2021-12-15 DIAGNOSIS — M21.41 PES PLANUS OF BOTH FEET: ICD-10-CM

## 2021-12-15 DIAGNOSIS — M21.42 PES PLANUS OF BOTH FEET: ICD-10-CM

## 2021-12-15 PROCEDURE — 20550 NJX 1 TENDON SHEATH/LIGAMENT: CPT | Performed by: PODIATRIST

## 2021-12-15 PROCEDURE — 99213 OFFICE O/P EST LOW 20 MIN: CPT | Mod: 25 | Performed by: PODIATRIST

## 2021-12-15 RX ORDER — DEXAMETHASONE SODIUM PHOSPHATE 4 MG/ML
4 INJECTION, SOLUTION INTRA-ARTICULAR; INTRALESIONAL; INTRAMUSCULAR; INTRAVENOUS; SOFT TISSUE ONCE
Status: COMPLETED | OUTPATIENT
Start: 2021-12-15 | End: 2021-12-15

## 2021-12-15 RX ORDER — TRIAMCINOLONE ACETONIDE 40 MG/ML
40 INJECTION, SUSPENSION INTRA-ARTICULAR; INTRAMUSCULAR ONCE
Status: COMPLETED | OUTPATIENT
Start: 2021-12-15 | End: 2021-12-15

## 2021-12-15 RX ADMIN — TRIAMCINOLONE ACETONIDE 40 MG: 40 INJECTION, SUSPENSION INTRA-ARTICULAR; INTRAMUSCULAR at 10:03

## 2021-12-15 RX ADMIN — DEXAMETHASONE SODIUM PHOSPHATE 4 MG: 4 INJECTION, SOLUTION INTRA-ARTICULAR; INTRALESIONAL; INTRAMUSCULAR; INTRAVENOUS; SOFT TISSUE at 10:04

## 2021-12-15 ASSESSMENT — PAIN SCALES - GENERAL: PAINLEVEL: NO PAIN (0)

## 2021-12-15 NOTE — PROGRESS NOTES
Chief complaint: Patient presents with:  Musculoskeletal Problem: Follow up plantar fasciitis      History of Present Illness: This 41 year old female is seen for follow-up management of heel pain bilaterally.     Patient had a RIGHT medial heel injection on 09/03/2021. She had a lot of pain the same day as the injection but the pain was resolved by the next morning.    The pain is back a little and while walking downstairs with pain shooting up to the top of her foot, but the pain is less painful int he heel. She still has enough heel pain again that she would like another injection today since it lasted so long last time.    She has not had issues with her ankle rolling.    She purchased new shoes including [place] and [employer]ofos and they are working very well for her. She has [employer]ics tennis shoes she already had. She is considering [employer]. She did not meet with anyone for specifics. She found some firm orthotics and she likes them when she is not wearing her supportive shoes.    ------------------------------    Additionally, she has a new foot pain in the RIGHT lateral dorsal foot. This has been increasing, but the pain has especially become the most prominent while walking down stairs. She points to her lateral dorsal midfoot as the worst area of pain.    No further pedal complaints today.         Patient does not use tobacco products.     ----------------------------------------------------    History of foot pain:  Patient says the RIGHT foot is more commonly painful than the LEFT. However, she has a h/o pain in the RIGHT foot. Pain started around the summer of 2020 after she was at home more throughout the increased time at home when COVID started. She started wearing shoes around the house around the fall of 2021. She sometimes wore tennis shoes and other times she worse slippers. She wore Dr. Mccloud inserts but it didn't seem to make a difference.    Since the pain started and had been waxing and waning. The pain  was pretty consistent and she has some sort of pain every day by 2021. She was doing some stretches and she tried to avoid having to come to the doctor's office.    Secondly, patient complained of ankle rolling. She rolled her ankle at age 18 and she had a bad ankle sprain around 2019. She also had three bad ankle rolls and multiple ankle rolls between  and . This had been occurring several times throughout the years.        /85 (BP Location: Left arm, Patient Position: Sitting, Cuff Size: Adult Regular)   Pulse 88   Temp 98.2  F (36.8  C) (Tympanic)   SpO2 96%      Patient Active Problem List   Diagnosis     PCOS (polycystic ovarian syndrome)     Hirsutism     Elevated testosterone level in female     Ovarian hyperthecosis     History of pre-term labor     Hypothyroidism     Infertility, anovulation     PVC's (premature ventricular contractions)     H/O premature delivery     Bilateral carpal tunnel syndrome      (spontaneous vaginal delivery)     ACP (advance care planning)     Major depressive disorder, recurrent episode, moderate (H)     Gynecomastia, female     High risk HPV infection     Papanicolaou smear of cervix with low grade squamous intraepithelial lesion (LGSIL)     Encounter for other general counseling or advice on contraception       Past Surgical History:   Procedure Laterality Date     dental surgical procedure       LAPAROSCOPIC TUBAL DYE STUDY N/A 9/3/2014    Procedure: LAPAROSCOPIC TUBAL DYE STUDY;  Surgeon: Amanuel Purcell MD;  Location: [facility] OR     MAMMOPLASTY REDUCTION  10/19       Current Outpatient Medications   Medication     albuterol (PROAIR HFA/PROVENTIL HFA/VENTOLIN HFA) 108 (90 Base) MCG/ACT inhaler     buPROPion (WELLBUTRIN XL) 150 MG 24 hr tablet     buPROPion (WELLBUTRIN XL) 300 MG 24 hr tablet     norethindrone-ethinyl estradiol (MICROGESTIN ) 1-20 MG-MCG tablet     sertraline (ZOLOFT) 100 MG tablet     SYNTHROID 100 MCG tablet     No current  facility-administered medications for this visit.          Allergies   Allergen Reactions     Macrolides Hives     Macrolide antibiotics     Penicillins Hives     Sulfonamide Derivatives Hives     Sulfa       Family History   Problem Relation Age of Onset     Lipids Father         hyperlipidemia     Hypertension Father      Lipids Mother         hyperlipidemia     Lupus Mother         erythematosus     Diabetes Other      Thyroid Disease Other      Asthma No family hx of        Social History     Socioeconomic History     Marital status:      Spouse name: None     Number of children: None     Years of education: None     Highest education level: None   Occupational History     None   Tobacco Use     Smoking status: Never Smoker     Smokeless tobacco: Never Used   Substance and Sexual Activity     Alcohol use: No     Drug use: No     Sexual activity: Yes     Partners: Male   Other Topics Concern     Parent/sibling w/ CABG, MI or angioplasty before 65F 55M? No   Social History Narrative     None     Social Determinants of Health     Financial Resource Strain:      Difficulty of Paying Living Expenses:    Food Insecurity:      Worried About Running Out of Food in the Last Year:      Ran Out of Food in the Last Year:    Transportation Needs:      Lack of Transportation (Medical):      Lack of Transportation (Non-Medical):    Physical Activity:      Days of Exercise per Week:      Minutes of Exercise per Session:    Stress:      Feeling of Stress :    Social Connections:      Frequency of Communication with Friends and Family:      Frequency of Social Gatherings with Friends and Family:      Attends Zoroastrianism Services:      Active Member of Clubs or Organizations:      Attends Club or Organization Meetings:      Marital Status:    Intimate Partner Violence:      Fear of Current or Ex-Partner:      Emotionally Abused:      Physically Abused:      Sexually Abused:        ROS: 10 point ROS neg other than the  symptoms noted above in the HPI.  EXAM  Constitutional: healthy, alert and no distress    Psychiatric: mentation appears normal and affect normal/bright    VASCULAR:  -Dorsalis pedis pulse +2/4 b/l  -Posterior tibial pulse +2/4 b/l  -Capillary refill time < 3 seconds to b/l hallux  NEURO:  -Light touch sensation intact to b/l plantar forefoot  DERM:  -Skin temperature, texture and turgor WNL b/l  -Toenails elongated, thickened, dystrophic and discolored x 10  MSK:  -Pain on palpation to bilateral medial tubercle of the calcaneus, RIGHT foot  -Pain on palpation to the RIGHT dorsal 3rd and 4th TMTJ  -Mild tenderness on palpation to the mid shaft of the RIGHT fourth metatarsal     -Muscle strength of ankles +5/5 for dorsiflexion, plantarflexion, ABDUction and ADDuction b/l  -Ankle joint passive ROM within normal limits except for dorsiflexion:    Dorsiflexion, RIGHT Straight knee 0 degrees    Dorsiflexion, LEFT Straight knee 0 degrees    -Mild laxity to the RIGHT ankle with mildly increased inversion of RIGHT ankle compared to LEFT   -Mild decrease in arch height while patient is NWB  ============================================================    ASSESSMENT:  (M72.2) Plantar fasciitis  (primary encounter diagnosis)    (M79.671) Right foot pain    (M24.271) Ligamentous laxity of right ankle    (M21.41,  M21.42) Pes planus of both feet      PLAN:  -Patient evaluated and examined. Treatment options discussed with no educational barriers noted.  -Again reviewed plantar fascia pain  treatment options. Patient's pain was likely started due to a combination of factors that can include but are not limited to worn or improper shoe gear, sudden change in shoe gear, change in activity, imbalanced biomechanics (such as the patient's bilateral equinus deformity of the calf muscles).  ---Patient has been wearing supportive shoe gear including supportive shoes around the house. Her pain has improved but the heel pain is back and she  would like another injection today. The last injected was very beneficial and fully relieved her pain for a few months.  ---Patient to consider orthotics (OTC or CMOs) if pain does not continue to improve.  ---Consider physical therapy if pain does not improve.  ---Today, patient would like to try the injection only.  ---Continue calf stretches and icing with a frozen water bottle at home. Patient has been doing this and she is in agreement with this plan.    -Injection x 1 to the RIGHT medial heel: Obtained written and verbal consent from patient for a steroid injection into the medial heel of the Right  foot. Reviewed risks and benefits of the injection. Informed patient that the injection may decrease pain long-term, short-term, or not at all. Patient in agreement with an injection today in an effort to slow or reverse the chronic inflammatory process and pain in the foot.   ---Patient signed informed consent and a time-out was performed and there was verification of correct patient, procedure and laterality.  ---Prepped the injection site with an alcohol swab.  ---Injected a total of 3 mL of 1:1:1 of 4mg Dexamethasone, 40m g Kenalog, and 1mL of 2% Lidocaine plain. Patient tolerated the injection well.     -----------------------------------------------  -RIGHT ankle laxity: Patient has not had any recent ankle sprains  -----------------------------------------------  -RIGHT foot metatarsalgia:  ---Patient has had an increase in RIGHT dorsal lateral 3rd/4th metatarsal and TMTJ pain. This may be secondary to compensation from the heel pain and/or secondary to her ankle laxity that is causing her to invert her foot more when walking. Her pain is currently only prominent when descending stairs. She has OTC orthotics she purchased. She should wear these more consistently with tennis shoes in the house until the pain lessens. If the pain does not improve, she is advised to call for a referral for orthotics or to  consider seeing the orthotist at [facility] for support OTC orthotics that will prevent her from tilting her mid and forefoot in a varus position.  ---Physical therapy should be considered as well if pain is not improving to strengthen the foot and ankle muscles and tendons. She is declining physical therapy at this time.  ---If pain worsens and/or becomes more consistent with all walking (beyond just descending stairs), she should call the clinic right away since a stress fracture can develop in the metatarsals with consistent pressure and pain. She is in agreement with this plan.    -Patient in agreement with the above treatment plan and all of patient's questions were answered.      RTC 2 1/2 months in [facility] for RIGHT plantar fascia pain and RIGHT ankle instability        Nia Victoria DPM

## 2021-12-15 NOTE — NURSING NOTE
"Chief Complaint   Patient presents with     Musculoskeletal Problem     Follow up plantar fasciitis       Initial /85 (BP Location: Left arm, Patient Position: Sitting, Cuff Size: Adult Regular)   Pulse 88   Temp 98.2  F (36.8  C) (Tympanic)   SpO2 96%  Estimated body mass index is 38.62 kg/m  as calculated from the following:    Height as of 10/27/21: 1.6 m (5' 3\").    Weight as of 10/27/21: 98.9 kg (218 lb).  Medication Reconciliation: complete  Carolee Alonso  "

## 2021-12-15 NOTE — PROGRESS NOTES
Clinic Administered Medication Documentation    Administrations This Visit     dexamethasone (DECADRON) injection 4 mg     Admin Date  12/15/2021 Action  Given Dose  4 mg Route  INTRA-ARTICULAR Site   Administered By  Grcae Domingo LPN    Ordering Provider: Nia Victoria DPM    Patient Supplied?: No          triamcinolone (KENALOG-40) injection 40 mg     Admin Date  12/15/2021 Action  Given Dose  40 mg Route  INTRA-ARTICULAR Site   Administered By  Grace Domingo LPN    Ordering Provider: Nia Victoria DPM    Patient Supplied?: No                Steroid injection of the right heel

## 2022-01-09 DIAGNOSIS — E03.9 HYPOTHYROIDISM, UNSPECIFIED TYPE: ICD-10-CM

## 2022-01-10 NOTE — TELEPHONE ENCOUNTER
Synthroid       Last Written Prescription Date:  7/15/2021  Last Fill Quantity: 90,   # refills: 0  Last Office Visit: 9/23/2021  Future Office visit:    Next 5 appointments (look out 90 days)    Mar 16, 2022  9:30 AM  (Arrive by 9:15 AM)  Return Visit with Nia Victoria DPM  [facility] ([facility] ) [address] [address] [phone]

## 2022-01-11 RX ORDER — LEVOTHYROXINE SODIUM 100 MCG
TABLET ORAL
Qty: 90 TABLET | Refills: 0 | Status: SHIPPED | OUTPATIENT
Start: 2022-01-11 | End: 2022-04-12

## 2022-01-15 ENCOUNTER — E-VISIT (OUTPATIENT)
Dept: URGENT CARE | Facility: CLINIC | Age: 42
End: 2022-01-15

## 2022-01-15 DIAGNOSIS — R05.9 COUGH: Primary | ICD-10-CM

## 2022-01-15 PROCEDURE — 99207 PR NON-BILLABLE SERV PER CHARTING: CPT | Performed by: NURSE PRACTITIONER

## 2022-01-16 NOTE — PATIENT INSTRUCTIONS
Dear Etta Obrien,    We are sorry you are not feeling well. Based on the responses you provided, it is recommended that you be seen in-person in urgent care so we can better evaluate your symptoms. Please click here to find the nearest urgent care location to you.   You will not be charged for this Visit. Thank you for trusting us with your care.    Ivania Mcdonald, CNP

## 2022-01-18 ENCOUNTER — E-VISIT (OUTPATIENT)
Dept: FAMILY MEDICINE | Facility: OTHER | Age: 42
End: 2022-01-18
Attending: FAMILY MEDICINE
Payer: COMMERCIAL

## 2022-01-18 DIAGNOSIS — J01.90 ACUTE SINUSITIS WITH SYMPTOMS > 10 DAYS: ICD-10-CM

## 2022-01-18 PROCEDURE — 99421 OL DIG E/M SVC 5-10 MIN: CPT | Performed by: FAMILY MEDICINE

## 2022-01-18 RX ORDER — CEPHALEXIN 500 MG/1
500 CAPSULE ORAL 3 TIMES DAILY
Qty: 30 CAPSULE | Refills: 0 | Status: SHIPPED | OUTPATIENT
Start: 2022-01-18 | End: 2022-01-28

## 2022-01-18 NOTE — PATIENT INSTRUCTIONS
Dear Etta Obrien    After reviewing your responses, I've been able to diagnose you with?a sinus infection caused by bacteria.?     Based on your responses and diagnosis, I have prescribed cephalexin to treat your symptoms. I have sent this to your pharmacy.?     It is also important to stay well hydrated, get lots of rest and take over-the-counter decongestants,?tylenol?or ibuprofen if you?are able to?take those medications per your primary care provider to help relieve discomfort.?     It is important that you take?all of?your prescribed medication even if your symptoms are improving after a few doses.? Taking?all of?your medicine helps prevent the symptoms from returning.?     If your symptoms worsen, you develop severe headache, vomiting, high fever (>102), or are not improving in 7 days, please contact your primary care provider for an appointment or visit any of our convenient Walk-in Care or Urgent Care Centers to be seen which can be found on our website?here.?     Thanks again for choosing?us?as your health care partner,?   ?  Ashley Allen MD?

## 2022-01-18 NOTE — TELEPHONE ENCOUNTER
Provider E-Visit time total (minutes): 8    ELECTRONIC VISIT DOCUMENTATION:    SUBJECTIVE:  Note above accurately captures the substance of my conversation with the patient.    ASSESSMENT/ PLAN:  1. Acute sinusitis with symptoms > 10 days  Overall, symptoms have been present for over 10 days.  With upcoming surgery, antibiotics will be sent.  Follow-up if no improvement noted.  - cephALEXin (KEFLEX) 500 MG capsule; Take 1 capsule (500 mg) by mouth 3 times daily for 10 days  Dispense: 30 capsule; Refill: 0    Ashley Allen MD

## 2022-01-21 ENCOUNTER — OFFICE VISIT (OUTPATIENT)
Dept: PODIATRY | Facility: OTHER | Age: 42
End: 2022-01-21
Attending: PODIATRIST
Payer: COMMERCIAL

## 2022-01-21 VITALS
TEMPERATURE: 97.2 F | OXYGEN SATURATION: 98 % | SYSTOLIC BLOOD PRESSURE: 125 MMHG | DIASTOLIC BLOOD PRESSURE: 82 MMHG | HEART RATE: 89 BPM

## 2022-01-21 DIAGNOSIS — M25.571 SINUS TARSI SYNDROME OF RIGHT FOOT: ICD-10-CM

## 2022-01-21 DIAGNOSIS — M24.271 LIGAMENTOUS LAXITY OF RIGHT ANKLE: ICD-10-CM

## 2022-01-21 DIAGNOSIS — M21.42 PES PLANUS OF BOTH FEET: ICD-10-CM

## 2022-01-21 DIAGNOSIS — M76.71 PERONEAL TENDINITIS OF RIGHT LOWER EXTREMITY: Primary | ICD-10-CM

## 2022-01-21 DIAGNOSIS — M79.671 RIGHT FOOT PAIN: ICD-10-CM

## 2022-01-21 DIAGNOSIS — M21.41 PES PLANUS OF BOTH FEET: ICD-10-CM

## 2022-01-21 PROCEDURE — 99213 OFFICE O/P EST LOW 20 MIN: CPT | Performed by: PODIATRIST

## 2022-01-21 ASSESSMENT — PAIN SCALES - GENERAL: PAINLEVEL: NO PAIN (0)

## 2022-01-21 NOTE — PROGRESS NOTES
Chief complaint: Patient presents with:  Musculoskeletal Problem: rolled ankle, pain      History of Present Illness: This 41 year old female is seen for follow-up management of heel pain bilaterally.     Her heel pain is greatly improved, especially after her RIGHT heel injection on 12/15/2022. She is wearing an OTC orthotic from [employer] and they are very comfortable.    She has a new injury today. She rolled her ankle while taking Matthew presents down stairs. She did not fall, but her ankle rolled. She has had persistent pain since she rolled it. She is able to walk, but it is still an aching pain. She feels a shooting pain that zings in her toes. She is wondering how to decrease this pain.      She wears Sewell and Oofos sandals and Asics tennis shoes.        No further pedal complaints today.         Patient does not use tobacco products.     ----------------------------------------------------    History of foot pain:  Patient says the RIGHT foot is more commonly painful than the LEFT. However, she has a h/o pain in the RIGHT foot. Pain started around the summer of 2020 after she was at home more throughout the increased time at home when COVID started. She started wearing shoes around the house around the fall of 2021. She sometimes wore tennis shoes and other times she worse slippers. She wore Dr. Mccloud inserts but it didn't seem to make a difference.    Since the pain started and had been waxing and waning. The pain was pretty consistent and she has some sort of pain every day by September of 2021. She was doing some stretches and she tried to avoid having to come to the doctor's office.    Secondly, patient complained of ankle rolling. She rolled her ankle at age 18 and she had a bad ankle sprain around 2019. She also had three bad ankle rolls and multiple ankle rolls between 2019 and 2021. This had been occurring several times throughout the years.        /82 (BP Location: Left arm, Patient  Position: Left side, Cuff Size: Adult Regular)   Pulse 89   Temp 97.2  F (36.2  C) (Tympanic)   SpO2 98%      Patient Active Problem List   Diagnosis     PCOS (polycystic ovarian syndrome)     Hirsutism     Elevated testosterone level in female     Ovarian hyperthecosis     History of pre-term labor     Hypothyroidism     Infertility, anovulation     PVC's (premature ventricular contractions)     H/O premature delivery     Bilateral carpal tunnel syndrome      (spontaneous vaginal delivery)     ACP (advance care planning)     Major depressive disorder, recurrent episode, moderate (H)     Gynecomastia, female     High risk HPV infection     Papanicolaou smear of cervix with low grade squamous intraepithelial lesion (LGSIL)     Encounter for other general counseling or advice on contraception       Past Surgical History:   Procedure Laterality Date     dental surgical procedure       LAPAROSCOPIC TUBAL DYE STUDY N/A 9/3/2014    Procedure: LAPAROSCOPIC TUBAL DYE STUDY;  Surgeon: Amanuel Purcell MD;  Location: [facility] OR     MAMMOPLASTY REDUCTION  10/19       Current Outpatient Medications   Medication     albuterol (PROAIR HFA/PROVENTIL HFA/VENTOLIN HFA) 108 (90 Base) MCG/ACT inhaler     buPROPion (WELLBUTRIN XL) 150 MG 24 hr tablet     buPROPion (WELLBUTRIN XL) 300 MG 24 hr tablet     cephALEXin (KEFLEX) 500 MG capsule     norethindrone-ethinyl estradiol (MICROGESTIN /) 1-20 MG-MCG tablet     sertraline (ZOLOFT) 100 MG tablet     SYNTHROID 100 MCG tablet     No current facility-administered medications for this visit.          Allergies   Allergen Reactions     Macrolides Hives     Macrolide antibiotics     Penicillins Hives     Sulfonamide Derivatives Hives     Sulfa       Family History   Problem Relation Age of Onset     Lipids Father         hyperlipidemia     Hypertension Father      Lipids Mother         hyperlipidemia     Lupus Mother         erythematosus     Diabetes Other      Thyroid Disease Other       Asthma No family hx of        Social History     Socioeconomic History     Marital status:      Spouse name: None     Number of children: None     Years of education: None     Highest education level: None   Occupational History     None   Tobacco Use     Smoking status: Never Smoker     Smokeless tobacco: Never Used   Substance and Sexual Activity     Alcohol use: No     Drug use: No     Sexual activity: Yes     Partners: Male   Other Topics Concern     Parent/sibling w/ CABG, MI or angioplasty before 65F 55M? No   Social History Narrative     None     Social Determinants of Health     Financial Resource Strain:      Difficulty of Paying Living Expenses:    Food Insecurity:      Worried About Running Out of Food in the Last Year:      Ran Out of Food in the Last Year:    Transportation Needs:      Lack of Transportation (Medical):      Lack of Transportation (Non-Medical):    Physical Activity:      Days of Exercise per Week:      Minutes of Exercise per Session:    Stress:      Feeling of Stress :    Social Connections:      Frequency of Communication with Friends and Family:      Frequency of Social Gatherings with Friends and Family:      Attends Jainism Services:      Active Member of Clubs or Organizations:      Attends Club or Organization Meetings:      Marital Status:    Intimate Partner Violence:      Fear of Current or Ex-Partner:      Emotionally Abused:      Physically Abused:      Sexually Abused:        ROS: 10 point ROS neg other than the symptoms noted above in the HPI.  EXAM  Constitutional: healthy, alert and no distress    Psychiatric: mentation appears normal and affect normal/bright    VASCULAR:  -Dorsalis pedis pulse +2/4 b/l  -Posterior tibial pulse +2/4 b/l  -Capillary refill time < 3 seconds to b/l hallux  NEURO:  -Light touch sensation intact to b/l plantar forefoot  DERM:  -Skin temperature, texture and turgor WNL b/l  -Toenails elongated, thickened, dystrophic and discolored x  10  MSK:  -Pain on palpation to the RIGHT sinus tarsi  -Pain on palpation to the RIGHT peroneal brevis tendon from just just posterior to the lateral malleolus and extending to the insertion on the styloid process  -No further pain on the bilateral medial tubercle of the calcaneus, RIGHT foot    -Muscle strength of ankles +5/5 for dorsiflexion, plantarflexion, ABDUction and ADDuction b/l  -Ankle joint passive ROM within normal limits except for dorsiflexion:    Dorsiflexion, RIGHT Straight knee 0 degrees    Dorsiflexion, LEFT Straight knee 0 degrees    -Mild laxity to the RIGHT ankle with mildly increased inversion of RIGHT ankle compared to LEFT   -Mild decrease in arch height while patient is NWB  ============================================================    ASSESSMENT:  (M76.71) Peroneal tendinitis of right lower extremity  (primary encounter diagnosis)    (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 Treatment options discussed with no educational barriers noted.  -The RIGHT foot metatarsalgia and RIGHT heel pain has improved.    -Patient has a new injury causing pain to the RIGHT sinus tarsi and RIGHT peroneal tendons.   Peroneal tendon pain / sinus tarsi pain / ankle weakness:  -Discussed peroneal tendon pain and sinus tarsi pain including potential etiologies and treatment options. Patient's pain was likely started due to a combination of factors that can include but are not limited to worn or improper shoe gear, sudden change in shoe gear, change in activity, imbalanced biomechanics (such as the patient's bilateral equinus deformity of the calf muscles). She rolled her ankle on the stairs which was the cause of the current pain.  ---Patient has a h/o ankle rolls. This was not recently an issue until she recently fell going down the stairs in  late December, 2022.     ---Discussed conservative treatment options including compression socks, icing, elevating, resting, orthotics, physical therapy, change in shoe gear (including proper shoe gear around the house). At this time, patient would like to proceed with the below treatment options. Her pain has been improving, so physical therapy may be sufficient. If her pain worsens, may add a mild tilt to her orthotic and/or offload her in a CAM boot.  ---If the sinus tarsi pain persist, will consider a steroid injection in 6-8 weeks. It is not advised today with the acute injury and pain.   ---Patient would like to try physical therapy. She is also in agreement to compression socks, and icing.    Physical therapy referral through [facility] per patient request to work on strengthening of the ankle structures and gait training and evaluation.    -Patient in agreement with the above treatment plan and all of patient's questions were answered.      RTC in March, 2022 in [facility] for RIGHT plantar fascia pain and RIGHT ankle instability (recent sinus tarsi and peroneal tendon pain on 01/21/2022)        Nia Victoria DPM

## 2022-01-21 NOTE — NURSING NOTE
"Chief Complaint   Patient presents with     Musculoskeletal Problem     rolled ankle, pain       Initial /82 (BP Location: Left arm, Patient Position: Left side, Cuff Size: Adult Regular)   Pulse 89   Temp 97.2  F (36.2  C) (Tympanic)   SpO2 98%  Estimated body mass index is 38.62 kg/m  as calculated from the following:    Height as of 10/27/21: 1.6 m (5' 3\").    Weight as of 10/27/21: 98.9 kg (218 lb).  Medication Reconciliation: complete  Carolee Alonso  "

## 2022-02-09 ASSESSMENT — PAIN SCALES - GENERAL: PAINLEVEL: NO PAIN (0)

## 2022-02-09 NOTE — NURSING NOTE
6 stitches removed form left carpal tunnel surgery 1-, well healed incision, no redness or swelling, steri strips applied, patient tolerated procedure  Roxann Shen LPN on 2/9/2022 at 9:20 AM

## 2022-02-09 NOTE — NURSING NOTE
"Chief Complaint   Patient presents with     Surgical Followup       Initial /72   Pulse 92   Temp (!) 96.5  F (35.8  C) (Tympanic)   Resp 16   SpO2 95%  Estimated body mass index is 38.62 kg/m  as calculated from the following:    Height as of 10/27/21: 1.6 m (5' 3\").    Weight as of 10/27/21: 98.9 kg (218 lb).  Medication Reconciliation: complete  Winsome Lawrence LPN    "

## 2022-02-11 ENCOUNTER — HOSPITAL ENCOUNTER (OUTPATIENT)
Dept: PHYSICAL THERAPY | Facility: OTHER | Age: 42
End: 2022-02-11
Attending: FAMILY MEDICINE
Payer: COMMERCIAL

## 2022-02-11 ENCOUNTER — OFFICE VISIT (OUTPATIENT)
Dept: ORTHOPEDICS | Facility: OTHER | Age: 42
End: 2022-02-11
Attending: SPECIALIST
Payer: COMMERCIAL

## 2022-02-11 VITALS
HEART RATE: 100 BPM | RESPIRATION RATE: 16 BRPM | SYSTOLIC BLOOD PRESSURE: 110 MMHG | DIASTOLIC BLOOD PRESSURE: 58 MMHG | OXYGEN SATURATION: 98 % | TEMPERATURE: 97.6 F

## 2022-02-11 DIAGNOSIS — G56.02 CARPAL TUNNEL SYNDROME OF LEFT WRIST: Primary | ICD-10-CM

## 2022-02-11 PROCEDURE — 97161 PT EVAL LOW COMPLEX 20 MIN: CPT | Mod: GP

## 2022-02-11 PROCEDURE — 99024 POSTOP FOLLOW-UP VISIT: CPT | Performed by: ORTHOPAEDIC SURGERY

## 2022-02-11 PROCEDURE — 97110 THERAPEUTIC EXERCISES: CPT | Mod: GP

## 2022-02-11 PROCEDURE — 97010 HOT OR COLD PACKS THERAPY: CPT | Mod: GP

## 2022-02-11 ASSESSMENT — PAIN SCALES - GENERAL: PAINLEVEL: NO PAIN (0)

## 2022-02-11 NOTE — NURSING NOTE
"Chief Complaint   Patient presents with     Surgical Followup       Initial /58   Pulse 100   Temp 97.6  F (36.4  C) (Tympanic)   Resp 16   SpO2 98%  Estimated body mass index is 38.62 kg/m  as calculated from the following:    Height as of 10/27/21: 1.6 m (5' 3\").    Weight as of 10/27/21: 98.9 kg (218 lb).  Medication Reconciliation: complete  Winsome Lawrence LPN    "

## 2022-02-11 NOTE — PROGRESS NOTES
"   02/11/22 0753   General Information   Type of Visit Initial OP Ortho PT Evaluation   Start of Care Date 02/11/22   Referring Physician Dr. Victoria   Patient/Family Goals Statement Lessen pain and improve stability   Orders Evaluate and Treat   Orders Comment Strengthening, proprioception, gait training   Date of Order 01/21/22   Certification Required? No   Medical Diagnosis Peroneal tendinitis of right lower extremity, sinus Tarsi syndrome of right foot, ligamentous laxity right ankle, pes planus of both feet, right foot pain   Surgical/Medical history reviewed Yes   Precautions/Limitations no known precautions/limitations   General Information Comments Past medical history-see chart   Body Part(s)   Body Part(s) Ankle/Foot   Presentation and Etiology   Pertinent history of current problem (include personal factors and/or comorbidities that impact the POC) Patient reports she has a significant history of right ankle sprains stemming back to when she had her first sprain when she was 18 years old.  She recalls in July 2014 having a significant ankle sprain requiring her to use crutches.  More recently she reports she \"rolled my ankle \"in December 2021 when going downstairs.  Since that time she has had intermittent ankle soreness/aching.  She also reports having a previous history of plantar fasciitis which she did purchase some over-the-counter inserts for and that this has improved significantly.  She has recently seen by podiatry and reports she was referred to physical therapy.  She was also recommended to use compression socks ice elevate and make sure she is wearing shoes around the home.   Impairments A. Pain;E. Decreased flexibility;D. Decreased ROM;F. Decreased strength and endurance   Functional Limitations perform activities of daily living;perform required work activities;perform desired leisure / sports activities   Symptom Location Chief complaint of pain along the right lateral ankle distal to the " lateral malleolus also along the dorsal lateral foot   Chronicity Chronic   Pain rating (0-10 point scale) Best (/10);Worst (/10)   Best (/10) 2   Worst (/10) 7   Pain quality A. Sharp;C. Aching;F. Stabbing   Frequency of pain/symptoms B. Intermittent   Pain/symptoms are: The same all the time   Pain/symptoms exacerbated by B. Walking;K. Home tasks;M. Other   Pain exacerbation comment Stairs, standing, running, hopping   Pain/symptoms eased by C. Rest;H. Cold   Progression of symptoms since onset: Improved   Current / Previous Interventions   Diagnostic Tests: X-ray   X-ray Results Results   X-ray results X-ray from 2014 noted calcaneal spur   Current Level of Function   Patient role/employment history A. Employed   Employment Comments [employer]    Fall Risk Screen   Fall screen completed by PT   Have you fallen 2 or more times in the past year? No   Have you fallen and had an injury in the past year? No   Is patient a fall risk? No   Abuse Screen (yes response referral indicated)   Feels Unsafe at Home or Work/School no   Feels Threatened by Someone no   Does Anyone Try to Keep You From Having Contact with Others or Doing Things Outside Your Home? no   Physical Signs of Abuse Present no   System Outcome Measures   Outcome Measures   (Lower extremity functional scale)   Ankle/Foot Objective Findings   Side (if bilateral, select both right and left) Right   Observation No acute distress   Balance/ Proprioception (Single Leg Stance) Decreased with significant ankle strategy-15 seconds   Foot Position In Standing Bilateral genu recurvatum, pes planus and calcaneal pronation   Ankle/Foot ROM Comment Hypomobility in the hamstring and quadricep muscles   Anterior Drawer Test Negative   Posterior Drawer Test Negative   Palpation Tenderness to palpation generally along the right lateral ankle especially distal and anterior to the lateral malleolus.  She also had tenderness along the dorsal fourth metatarsal   Accessory  Motion/Joint Mobility Decreased dorsal glide of the talocrural joint, decreased traction of the subtalar joint   Right DF (Knee Ext) AROM 01 degrees with knee extended, 0 to 13 degrees with knee flexed   Right PF AROM Within normal limits   Right Calcanceal Inversion AROM 0-30   Right Calcaneal Eversion AROM Within normal limits   Right Great Toe Flexion AROM Within normal limits   Right DF/Inversion Strength 5/5   Right DF/Eversion Strength 5/5   Right PF/Inversion Strength 5/5   Right PF/Eversion Strength 4 -/5   Right PF Strength 5/5   Right Gastroc (in WB) Flexibility Hypomobile   Right Soleus (in WB) Flexibility Hypomobile   Planned Therapy Interventions   Planned Therapy Interventions balance training;gait training;joint mobilization;manual therapy;ROM;strengthening;stretching   Planned Modality Interventions   Planned Modality Interventions Comments Modalities as indicated   Clinical Impression   Criteria for Skilled Therapeutic Interventions Met yes, treatment indicated   PT Diagnosis Right ankle/foot pain   Influenced by the following impairments Pain, decreased mobility, decreased strength, decreased balance   Functional limitations due to impairments Walking, standing, stairs, running, hopping, household tasks   Clinical Presentation Stable/Uncomplicated   Clinical Presentation Rationale Clinical judgment-no comorbidities that may negatively influence anticipated PT outcome   Clinical Decision Making (Complexity) Low complexity   Therapy Frequency other (see comments)  (1-2 times per week)   Predicted Duration of Therapy Intervention (days/wks) Up to 6 weeks   Risk & Benefits of therapy have been explained Yes   Patient, Family & other staff in agreement with plan of care Yes   Clinical Impression Comments Patient presents with history of ankle sprains with objective findings noting decreased ankle mobility, decreased ankle strength and proprioception   Education Assessment   Barriers to Learning No  barriers   ORTHO GOALS   PT Ortho Eval Goals 1;2;3   Ortho Goal 1   Goal Identifier STG 1   Goal Description Increased ankle range of motion by 5 degrees   Target Date 02/25/22   Ortho Goal 2   Goal Identifier LTG 1   Goal Description Patient will be able to walk up to 1 mile with no difficulty 75% of the time   Target Date 03/25/22   Ortho Goal 3   Goal Identifier LTG 2   Goal Description Patient will demonstrate independence with home exercise program   Target Date 03/25/22   Total Evaluation Time   PT Eval, Low Complexity Minutes (14898) 25

## 2022-02-11 NOTE — PROGRESS NOTES
Service Date: 2022    Etta has seen Dr. Al on 2022 at the [facility] for left open carpal tunnel release.  Her sutures have been removed.  Wound is healing nicely and were Steri-Stripped, and her paresthesias are completely resolved.  She is doing great, very happy with her care she has received by Dr. Al.  She is already back to work.  She will be seen on a p.r.n. basis.  She is really, really pleased with her outcome and I am very happy that she has had such good care and good surgery.    Cole Hong MD        D: 2022   T: 2022   MT: RAFAEL/DCQA    Name:     ETTA ROY  MRN:      [id number]        Account:      [id number]   :      1980           Service Date: 2022       Document: [id number]

## 2022-02-16 ENCOUNTER — APPOINTMENT (OUTPATIENT)
Dept: URGENT CARE | Facility: CLINIC | Age: 42
End: 2022-02-16

## 2022-02-18 ENCOUNTER — E-VISIT (OUTPATIENT)
Dept: FAMILY MEDICINE | Facility: OTHER | Age: 42
End: 2022-02-18
Attending: FAMILY MEDICINE
Payer: COMMERCIAL

## 2022-02-18 DIAGNOSIS — J01.90 ACUTE SINUSITIS, RECURRENCE NOT SPECIFIED, UNSPECIFIED LOCATION: Primary | ICD-10-CM

## 2022-02-18 PROCEDURE — 99421 OL DIG E/M SVC 5-10 MIN: CPT | Performed by: FAMILY MEDICINE

## 2022-02-18 NOTE — PATIENT INSTRUCTIONS
Thank you for submitting your symptoms via eVisit.  Often times, symptoms like the ones you are experiencing are caused by a virus.  A little more time will tell us if you need antibiotics or not.  Viral infection are usually at their worst this many days into symptoms, and start to improve shortly.  If your symptoms persist through the weekend, then antibiotics would be indicated.  Please send a [employer] message (you do not need to submit another eVisit) if you are not noticing any improvement in symptoms by early next week, and I would then send in antibiotics.      Ashley Allen MD      Dear Etta Obrien    After reviewing your responses, I've been able to diagnose you with?a sinus infection caused by a virus.?     Based on your responses and diagnosis, I would recommend over the counter antihistamines and cough medication to help with your symptoms.?     It is also important to stay well hydrated, get lots of rest and take over-the-counter decongestants,?tylenol?or ibuprofen if you?are able to?take those medications per your primary care provider to help relieve discomfort.?     Please send your provider a message as requested if your symptoms are not improving over the weekend.    Thanks again for choosing?us?as your health care partner,?   ?  Ashley Allen MD?

## 2022-02-18 NOTE — TELEPHONE ENCOUNTER
Provider E-Visit time total (minutes): 6    ELECTRONIC VISIT DOCUMENTATION:    SUBJECTIVE:  Note above accurately captures the substance of my conversation with the patient.    ASSESSMENT/ PLAN:  1. Acute sinusitis, recurrence not specified, unspecified location  Symptomatic cares recommended.  Would consider antibiotics if symptoms not improving over the weekend.      Ashley Allen MD

## 2022-02-23 ENCOUNTER — HOSPITAL ENCOUNTER (OUTPATIENT)
Dept: PHYSICAL THERAPY | Facility: OTHER | Age: 42
End: 2022-02-23
Attending: PODIATRIST
Payer: COMMERCIAL

## 2022-02-23 PROCEDURE — 97140 MANUAL THERAPY 1/> REGIONS: CPT | Mod: GP

## 2022-02-25 ENCOUNTER — OFFICE VISIT (OUTPATIENT)
Dept: OTOLARYNGOLOGY | Facility: OTHER | Age: 42
End: 2022-02-25
Attending: FAMILY MEDICINE
Payer: COMMERCIAL

## 2022-02-25 VITALS
DIASTOLIC BLOOD PRESSURE: 74 MMHG | HEIGHT: 63 IN | OXYGEN SATURATION: 98 % | WEIGHT: 218 LBS | SYSTOLIC BLOOD PRESSURE: 116 MMHG | BODY MASS INDEX: 38.62 KG/M2 | HEART RATE: 90 BPM | TEMPERATURE: 97.1 F

## 2022-02-25 DIAGNOSIS — R05.9 COUGH: Primary | ICD-10-CM

## 2022-02-25 DIAGNOSIS — J34.3 NASAL TURBINATE HYPERTROPHY: ICD-10-CM

## 2022-02-25 DIAGNOSIS — J01.91 ACUTE RECURRENT SINUSITIS, UNSPECIFIED LOCATION: ICD-10-CM

## 2022-02-25 DIAGNOSIS — R09.82 POST-NASAL DRAINAGE: ICD-10-CM

## 2022-02-25 PROCEDURE — 99213 OFFICE O/P EST LOW 20 MIN: CPT | Mod: 25 | Performed by: NURSE PRACTITIONER

## 2022-02-25 PROCEDURE — 31231 NASAL ENDOSCOPY DX: CPT | Performed by: NURSE PRACTITIONER

## 2022-02-25 RX ORDER — BUDESONIDE 0.5 MG/2ML
INHALANT ORAL
Qty: 60 ML | Refills: 4 | Status: SHIPPED | OUTPATIENT
Start: 2022-02-25

## 2022-02-25 RX ORDER — MONTELUKAST SODIUM 10 MG/1
10 TABLET ORAL AT BEDTIME
Qty: 30 TABLET | Refills: 3 | Status: SHIPPED | OUTPATIENT
Start: 2022-02-25 | End: 2022-08-26

## 2022-02-25 ASSESSMENT — PAIN SCALES - GENERAL: PAINLEVEL: NO PAIN (0)

## 2022-02-25 NOTE — PATIENT INSTRUCTIONS
Thank you for allowing Winsome Gross NP and our ENT team to participate in your care.  If your medications are too expensive, please give the nurse a call.  We can possibly change this medication.  If you have a scheduling or an appointment question please contact our Health Unit Coordinator at their direct line [phone].  ALL nursing questions or concerns can be directed to your ENT Nurse: [phone]--Eleni    Budesonide nasal saline irrigation per instructions:  -Obtain Dallin Med Sinus rinse over the counter.    -Use warm distilled water and 2 packets of the salt solution that comes with the bottle, dissolve in bottle up to the 240 mL prasanna.  -Add 1 vial of budesonide.  -Irrigate each side of your nose leaning over the sink, using 1/3 to 1/2 the volume of the bottle in each nostril every irrigation.  Irrigate 2 times daily.  -If additional rinses are needed/recommended, you may use the plan Dallin Med Sinus irrigation without the use of added budesonide.     Ordered montelukast (SINGULAIR) 10 MG tablet, Take 1 tablet (10 mg) by mouth At Bedtime    Ordered Allergy testing, an allergy nurse will call you to schedule.     If there is no improvement, may change medications  
Universal Safety Interventions

## 2022-02-25 NOTE — LETTER
2/25/2022         RE: Etta Obrien  [address] [facility] [id number]        Dear Colleague,    Thank you for referring your patient, Etta Obrien, to the [facility]. Please see a copy of my visit note below.    Otolaryngology Note         Chief Complaint:     Patient presents with:  Consult: Acute Sinusitis; Referred by Dr. Allen           History of Present Illness:     Etta Obrien is a 41 year old female seen today for recurrent sinusitis.      She has recently had an increase in sinus symptoms.  She was treated with keflex in October, Jan, and currently on cefdinir and has had improvement since starting.  Symptoms always starts with a cough then sinus symptoms start.  Symptoms include upper jaw and teeth pain. She was treated with keflex in October and January.    She is allergic to macrolides, pcn, sulfa, so abx selection has been limited.     She reports the cough had been in her chest previously, since abx treatment now the cough feels like a tickle in the throat.      She has associated nasal congestion with illness.  When she is not ill she has morning congestion that tends to clear as the day goes on.      She is able to smell and taste.  She has taken multiple at home Covid tests over the past several months and has been negative.      She denies current PND.  But it seems to start with PND that moves into the chest with symptoms  She has associated wheezing with cough.    No fevers.    She has coughing fits that she has difficulty getting to stop.    Appetite is good.     She reports she is always tired.  She is a single mom going to school and working full time.    No shortness of breath    She uses albuterol with coughing fits.   No real history of asthma     No history of sinus injury/trauma/surgery  She has intermittent plugging sensation in her ears, no history of COM, otological surgery or flux hearing  No history of rash  She has concerns history of  seasonal allergies  No history of previous allergy testing  No family history of asthma, allergies, sinus issues     Resides in house with a basement. There is no water or mold.  There is carpet in the bedroom and throughout most of the house.    Heat source in home: forced air  Pets: 1 dog         Medications:     Current Outpatient Rx   Medication Sig Dispense Refill     albuterol (PROAIR HFA/PROVENTIL HFA/VENTOLIN HFA) 108 (90 Base) MCG/ACT inhaler Inhale 1-2 puffs into the lungs every 4 hours as needed for shortness of breath / dyspnea or wheezing 18 g 0     budesonide (PULMICORT) 0.5 MG/2ML neb solution Mix 240 ml Dallin Med Sinus rinse, add 0.5 mg budesonide vial, rinse 1/2 bottle in each nostril twice daily 60 mL 4     buPROPion (WELLBUTRIN XL) 150 MG 24 hr tablet TAKE 1 TABLET BY MOUTH EVERY MORNING WITH 300 MG TABLET= 450 MG EVERY MORNING 90 tablet 3     buPROPion (WELLBUTRIN XL) 300 MG 24 hr tablet TAKE 1 TABLET BY MOUTH EVERY MORNING WITH 150 MG TABLET= 450 MG EVERY MORNING 90 tablet 3     cefdinir (OMNICEF) 300 MG capsule Take 1 capsule (300 mg) by mouth 2 times daily for 10 days 20 capsule 0     montelukast (SINGULAIR) 10 MG tablet Take 1 tablet (10 mg) by mouth At Bedtime 30 tablet 3     norethindrone-ethinyl estradiol (MICROGESTIN 1/20) 1-20 MG-MCG tablet TAKE 1 TABLET BY MOUTH EVERY DAY- SKIPPING PLACEBO 84 tablet 3     sertraline (ZOLOFT) 100 MG tablet TAKE 1 TABLET(100 MG) BY MOUTH DAILY 90 tablet 3     SYNTHROID 100 MCG tablet TAKE 1 TABLET BY MOUTH EVERY DAY 90 tablet 0            Allergies:     Allergies: Macrolides, Penicillins, and Sulfonamide derivatives          Past Medical History:     Past Medical History:   Diagnosis Date     Constipation, chronic      Gestational diabetes      Hirsutism      PCOS (polycystic ovarian syndrome)      Spina bifida occulta      Unspecified hypothyroidism             Past Surgical History:     Past Surgical History:   Procedure Laterality Date     CARPAL TUNNEL  "RELEASE RT/LT Left 01/25/2022    [facility]     dental surgical procedure       LAPAROSCOPIC TUBAL DYE STUDY N/A 09/03/2014    Procedure: LAPAROSCOPIC TUBAL DYE STUDY;  Surgeon: Amanuel Purcell MD;  Location: [facility]     MAMMOPLASTY REDUCTION  10/19       ENT family history reviewed         Social History:     Social History     Tobacco Use     Smoking status: Never Smoker     Smokeless tobacco: Never Used   Substance Use Topics     Alcohol use: No     Drug use: No            Review of Systems:     ROS: See HPI         Physical Exam:     /74 (Cuff Size: Adult Large)   Pulse 90   Temp 97.1  F (36.2  C) (Tympanic)   Ht 1.6 m (5' 3\")   Wt 98.9 kg (218 lb)   SpO2 98%   BMI 38.62 kg/m      General - The patient is well nourished and well developed, and appears to have good nutritional status.  Alert and oriented to person and place, answers questions and cooperates with examination appropriately.   Head and Face - Normocephalic and atraumatic, with no gross asymmetry noted.  The facial nerve is intact, with strong symmetric movements.  Voice and Breathing - The patient was breathing comfortably without the use of accessory muscles. There was no wheezing, stridor, or stertor.  The patients voice was clear and strong, and had appropriate pitch and quality.  Ears -The external auditory canals are patent, the tympanic membranes are intact without effusion, retraction or mass.  Bony landmarks are intact.  Eyes - Extraocular movements intact, sclera were not icteric or injected, conjunctiva were pink and moist.  Mouth - Examination of the oral cavity showed pink, healthy oral mucosa. No lesions or ulcerations noted.  The tongue was mobile and midline, and the dentition were in good condition.    Throat - The walls of the oropharynx were smooth, pink, moist, symmetric, and had no lesions or ulcerations.  The tonsillar pillars and soft palate were symmetric.  The uvula was midline on elevation. "   Neck - No worrisome lymphadenopathy.   Palpation of the thyroid was soft and smooth, with no nodules or goiter appreciated.  The trachea was mobile and midline.  Nose - External contour is symmetric, no gross deflection or scars.  The nasal passages are examined with nasal speculum.   Nasal mucosa is pink and moist with no abnormal mucus.  The septum was intact and the turbinates are enlarged.   No polyps, masses, or purulence noted on examination.  Occasional harsh sound cough throughout exam.  Lungs are CTA, heart S1, S2, regular rate and rhythm    To evaluate the nose and sinuses, I performed rigid nasal endoscopy.  I sprayed both nares with 2 sprays lidocaine and neosynephrine.     I began with the RIGHT side using a 0 degree rigid nasal endoscope, and then similarly examined the LEFT side     Findings:  Inferior turbinates:  moderately enlarged  Middle turbinate and middle meatus:  normal with clear drainage  Superior meatus is examined and unremarkable  No Sphenoethmoidal purulence  Mucosa is overall healthy throughout,  no polyps nor polypoid degeneration  Nasopharynx has copious clear drainage, no masses,  ET patent, no edema  The patient tolerated the procedure well            Assessment and Plan:       ICD-10-CM    1. Cough  R05.9 montelukast (SINGULAIR) 10 MG tablet   2. Acute recurrent sinusitis, unspecified location  J01.91 montelukast (SINGULAIR) 10 MG tablet     budesonide (PULMICORT) 0.5 MG/2ML neb solution   3. Post-nasal drainage  R09.82    4. Nasal turbinate hypertrophy  J34.3        Budesonide nasal saline irrigation per instructions:  -Obtain Dallin Med Sinus rinse over the counter.    -Use warm distilled water and 2 packets of the salt solution that comes with the bottle, dissolve in bottle up to the 240 mL prasanna.  -Add 1 vial of budesonide.  -Irrigate each side of your nose leaning over the sink, using 1/3 to 1/2 the volume of the bottle in each nostril every irrigation.  Irrigate 2 times  daily.  -If additional rinses are needed/recommended, you may use the plan Dallin Med Sinus irrigation without the use of added budesonide.     Start montelukast (SINGULAIR) 10 MG tablet, Take 1 tablet (10 mg) by mouth At Bedtime.  We discussed black box warning for concerns for agitation, changes in mental health or sleep patterns.  I encouraged her to read the medications instructions and stop if she is having such side effects.      Complete allergy testing    Indications for allergy testing include:    1) Confirm suspicion of allergic rhinitis due to inhalant allergies  2) Identify the offending allergen to determine specific mode of treatment  3) In the case of chronic rhinosinusitis: when symptoms are not controlled by avoidance and pharmacotherapy  4) In the Asthma patient when exacerbations may be due to perennial allergen exposure  5) Suspect food allergy  6) Otitis Media, chronic rhinitis, atopic dermatitis, Meniere disease, headache, pharyngitis or eye symptoms      Modified quantitative testing (MQT) will be performed.  Signed consent was obtained, and the risks of immunotherapy were discussed, including the potential for anaphylaxis.     If immunotherapy (IT) is recommended, there is continued risk of anaphylaxis.   Anaphylaxis can cause death. The patient will need to be monitored for 30 minutes post injection.  They must present their epinephrine pen prior to injection.  Subcutaneous as well as sublingual immunotherapy (SLIT) were discussed as potential treatment options.  The patient was told SLIT is not approved by the FDA and is cash pay.  The general time frame of immunotherapy was discussed (generally 3-5 years, sometimes longer), and the basic immunology behind IT was discussed.      Winsome CATALAN  [facility]          Again, thank you for allowing me to participate in the care of your patient.        Sincerely,        Winsome Gross NP

## 2022-02-25 NOTE — PROGRESS NOTES
Otolaryngology Note         Chief Complaint:     Patient presents with:  Consult: Acute Sinusitis; Referred by Dr. Allen           History of Present Illness:     Etta Obrien is a 41 year old female seen today for recurrent sinusitis.      She has recently had an increase in sinus symptoms.  She was treated with keflex in October, Jan, and currently on cefdinir and has had improvement since starting.  Symptoms always starts with a cough then sinus symptoms start.  Symptoms include upper jaw and teeth pain. She was treated with keflex in October and January.    She is allergic to macrolides, pcn, sulfa, so abx selection has been limited.     She reports the cough had been in her chest previously, since abx treatment now the cough feels like a tickle in the throat.      She has associated nasal congestion with illness.  When she is not ill she has morning congestion that tends to clear as the day goes on.      She is able to smell and taste.  She has taken multiple at home Covid tests over the past several months and has been negative.      She denies current PND.  But it seems to start with PND that moves into the chest with symptoms  She has associated wheezing with cough.    No fevers.    She has coughing fits that she has difficulty getting to stop.    Appetite is good.     She reports she is always tired.  She is a single mom going to school and working full time.    No shortness of breath    She uses albuterol with coughing fits.   No real history of asthma     No history of sinus injury/trauma/surgery  She has intermittent plugging sensation in her ears, no history of COM, otological surgery or flux hearing  No history of rash  She has concerns history of seasonal allergies  No history of previous allergy testing  No family history of asthma, allergies, sinus issues     Resides in house with a basement. There is no water or mold.  There is carpet in the bedroom and throughout most of the house.    Heat  source in home: forced air  Pets: 1 dog         Medications:     Current Outpatient Rx   Medication Sig Dispense Refill     albuterol (PROAIR HFA/PROVENTIL HFA/VENTOLIN HFA) 108 (90 Base) MCG/ACT inhaler Inhale 1-2 puffs into the lungs every 4 hours as needed for shortness of breath / dyspnea or wheezing 18 g 0     budesonide (PULMICORT) 0.5 MG/2ML neb solution Mix 240 ml Dallin Med Sinus rinse, add 0.5 mg budesonide vial, rinse 1/2 bottle in each nostril twice daily 60 mL 4     buPROPion (WELLBUTRIN XL) 150 MG 24 hr tablet TAKE 1 TABLET BY MOUTH EVERY MORNING WITH 300 MG TABLET= 450 MG EVERY MORNING 90 tablet 3     buPROPion (WELLBUTRIN XL) 300 MG 24 hr tablet TAKE 1 TABLET BY MOUTH EVERY MORNING WITH 150 MG TABLET= 450 MG EVERY MORNING 90 tablet 3     cefdinir (OMNICEF) 300 MG capsule Take 1 capsule (300 mg) by mouth 2 times daily for 10 days 20 capsule 0     montelukast (SINGULAIR) 10 MG tablet Take 1 tablet (10 mg) by mouth At Bedtime 30 tablet 3     norethindrone-ethinyl estradiol (MICROGESTIN 1/20) 1-20 MG-MCG tablet TAKE 1 TABLET BY MOUTH EVERY DAY- SKIPPING PLACEBO 84 tablet 3     sertraline (ZOLOFT) 100 MG tablet TAKE 1 TABLET(100 MG) BY MOUTH DAILY 90 tablet 3     SYNTHROID 100 MCG tablet TAKE 1 TABLET BY MOUTH EVERY DAY 90 tablet 0            Allergies:     Allergies: Macrolides, Penicillins, and Sulfonamide derivatives          Past Medical History:     Past Medical History:   Diagnosis Date     Constipation, chronic      Gestational diabetes      Hirsutism      PCOS (polycystic ovarian syndrome)      Spina bifida occulta      Unspecified hypothyroidism             Past Surgical History:     Past Surgical History:   Procedure Laterality Date     CARPAL TUNNEL RELEASE RT/LT Left 01/25/2022    [facility]     dental surgical procedure       LAPAROSCOPIC TUBAL DYE STUDY N/A 09/03/2014    Procedure: LAPAROSCOPIC TUBAL DYE STUDY;  Surgeon: Amanuel Purcell MD;  Location: [facility]     MAMMOPLASTY  "REDUCTION  10/19       ENT family history reviewed         Social History:     Social History     Tobacco Use     Smoking status: Never Smoker     Smokeless tobacco: Never Used   Substance Use Topics     Alcohol use: No     Drug use: No            Review of Systems:     ROS: See HPI         Physical Exam:     /74 (Cuff Size: Adult Large)   Pulse 90   Temp 97.1  F (36.2  C) (Tympanic)   Ht 1.6 m (5' 3\")   Wt 98.9 kg (218 lb)   SpO2 98%   BMI 38.62 kg/m      General - The patient is well nourished and well developed, and appears to have good nutritional status.  Alert and oriented to person and place, answers questions and cooperates with examination appropriately.   Head and Face - Normocephalic and atraumatic, with no gross asymmetry noted.  The facial nerve is intact, with strong symmetric movements.  Voice and Breathing - The patient was breathing comfortably without the use of accessory muscles. There was no wheezing, stridor, or stertor.  The patients voice was clear and strong, and had appropriate pitch and quality.  Ears -The external auditory canals are patent, the tympanic membranes are intact without effusion, retraction or mass.  Bony landmarks are intact.  Eyes - Extraocular movements intact, sclera were not icteric or injected, conjunctiva were pink and moist.  Mouth - Examination of the oral cavity showed pink, healthy oral mucosa. No lesions or ulcerations noted.  The tongue was mobile and midline, and the dentition were in good condition.    Throat - The walls of the oropharynx were smooth, pink, moist, symmetric, and had no lesions or ulcerations.  The tonsillar pillars and soft palate were symmetric.  The uvula was midline on elevation.   Neck - No worrisome lymphadenopathy.   Palpation of the thyroid was soft and smooth, with no nodules or goiter appreciated.  The trachea was mobile and midline.  Nose - External contour is symmetric, no gross deflection or scars.  The nasal passages are " examined with nasal speculum.   Nasal mucosa is pink and moist with no abnormal mucus.  The septum was intact and the turbinates are enlarged.   No polyps, masses, or purulence noted on examination.  Occasional harsh sound cough throughout exam.  Lungs are CTA, heart S1, S2, regular rate and rhythm    To evaluate the nose and sinuses, I performed rigid nasal endoscopy.  I sprayed both nares with 2 sprays lidocaine and neosynephrine.     I began with the RIGHT side using a 0 degree rigid nasal endoscope, and then similarly examined the LEFT side     Findings:  Inferior turbinates:  moderately enlarged  Middle turbinate and middle meatus:  normal with clear drainage  Superior meatus is examined and unremarkable  No Sphenoethmoidal purulence  Mucosa is overall healthy throughout,  no polyps nor polypoid degeneration  Nasopharynx has copious clear drainage, no masses,  ET patent, no edema  The patient tolerated the procedure well            Assessment and Plan:       ICD-10-CM    1. Cough  R05.9 montelukast (SINGULAIR) 10 MG tablet   2. Acute recurrent sinusitis, unspecified location  J01.91 montelukast (SINGULAIR) 10 MG tablet     budesonide (PULMICORT) 0.5 MG/2ML neb solution   3. Post-nasal drainage  R09.82    4. Nasal turbinate hypertrophy  J34.3        Budesonide nasal saline irrigation per instructions:  -Obtain Dallin Med Sinus rinse over the counter.    -Use warm distilled water and 2 packets of the salt solution that comes with the bottle, dissolve in bottle up to the 240 mL prasanna.  -Add 1 vial of budesonide.  -Irrigate each side of your nose leaning over the sink, using 1/3 to 1/2 the volume of the bottle in each nostril every irrigation.  Irrigate 2 times daily.  -If additional rinses are needed/recommended, you may use the plan Dallin Med Sinus irrigation without the use of added budesonide.     Start montelukast (SINGULAIR) 10 MG tablet, Take 1 tablet (10 mg) by mouth At Bedtime.  We discussed black box warning  for concerns for agitation, changes in mental health or sleep patterns.  I encouraged her to read the medications instructions and stop if she is having such side effects.      Complete allergy testing    Indications for allergy testing include:    1) Confirm suspicion of allergic rhinitis due to inhalant allergies  2) Identify the offending allergen to determine specific mode of treatment  3) In the case of chronic rhinosinusitis: when symptoms are not controlled by avoidance and pharmacotherapy  4) In the Asthma patient when exacerbations may be due to perennial allergen exposure  5) Suspect food allergy  6) Otitis Media, chronic rhinitis, atopic dermatitis, Meniere disease, headache, pharyngitis or eye symptoms      Modified quantitative testing (MQT) will be performed.  Signed consent was obtained, and the risks of immunotherapy were discussed, including the potential for anaphylaxis.     If immunotherapy (IT) is recommended, there is continued risk of anaphylaxis.   Anaphylaxis can cause death. The patient will need to be monitored for 30 minutes post injection.  They must present their epinephrine pen prior to injection.  Subcutaneous as well as sublingual immunotherapy (SLIT) were discussed as potential treatment options.  The patient was told SLIT is not approved by the FDA and is cash pay.  The general time frame of immunotherapy was discussed (generally 3-5 years, sometimes longer), and the basic immunology behind IT was discussed.      Winsome CATALAN  [facility]

## 2022-02-25 NOTE — NURSING NOTE
"Chief Complaint   Patient presents with     Consult     Acute Sinusitis; Referred by Dr. Allen       Initial /74 (Cuff Size: Adult Large)   Pulse 90   Temp 97.1  F (36.2  C) (Tympanic)   Ht 1.6 m (5' 3\")   Wt 98.9 kg (218 lb)   SpO2 98%   BMI 38.62 kg/m   Estimated body mass index is 38.62 kg/m  as calculated from the following:    Height as of this encounter: 1.6 m (5' 3\").    Weight as of this encounter: 98.9 kg (218 lb).  Medication Reconciliation: complete  Loan Sims LPN    "

## 2022-03-09 ENCOUNTER — HOSPITAL ENCOUNTER (OUTPATIENT)
Dept: PHYSICAL THERAPY | Facility: OTHER | Age: 42
End: 2022-03-09
Attending: PODIATRIST
Payer: COMMERCIAL

## 2022-03-09 PROCEDURE — 97140 MANUAL THERAPY 1/> REGIONS: CPT | Mod: GP

## 2022-03-15 ENCOUNTER — TELEPHONE (OUTPATIENT)
Dept: ALLERGY | Facility: OTHER | Age: 42
End: 2022-03-15
Payer: COMMERCIAL

## 2022-03-15 NOTE — TELEPHONE ENCOUNTER
Attempted to reach patient to discuss allergy testing instructions.  No answer, left a message requesting a return call.    Atul Mayberry RN on 3/15/2022 at 11:35 AM

## 2022-03-16 ENCOUNTER — HOSPITAL ENCOUNTER (OUTPATIENT)
Dept: PHYSICAL THERAPY | Facility: OTHER | Age: 42
Discharge: HOME | End: 2022-03-16
Payer: COMMERCIAL

## 2022-03-16 ENCOUNTER — OFFICE VISIT (OUTPATIENT)
Dept: PODIATRY | Facility: OTHER | Age: 42
End: 2022-03-16
Attending: PODIATRIST
Payer: COMMERCIAL

## 2022-03-16 VITALS
BODY MASS INDEX: 38.62 KG/M2 | HEIGHT: 63 IN | WEIGHT: 218 LBS | DIASTOLIC BLOOD PRESSURE: 74 MMHG | SYSTOLIC BLOOD PRESSURE: 118 MMHG | HEART RATE: 100 BPM | OXYGEN SATURATION: 98 %

## 2022-03-16 DIAGNOSIS — M62.462 GASTROCNEMIUS EQUINUS OF LEFT LOWER EXTREMITY: ICD-10-CM

## 2022-03-16 DIAGNOSIS — M25.571 SINUS TARSI SYNDROME OF RIGHT FOOT: ICD-10-CM

## 2022-03-16 DIAGNOSIS — M21.42 PES PLANUS OF BOTH FEET: ICD-10-CM

## 2022-03-16 DIAGNOSIS — M24.271 LIGAMENTOUS LAXITY OF RIGHT ANKLE: ICD-10-CM

## 2022-03-16 DIAGNOSIS — M21.41 PES PLANUS OF BOTH FEET: ICD-10-CM

## 2022-03-16 DIAGNOSIS — M79.671 RIGHT FOOT PAIN: ICD-10-CM

## 2022-03-16 DIAGNOSIS — M76.71 PERONEAL TENDINITIS OF RIGHT LOWER EXTREMITY: Primary | ICD-10-CM

## 2022-03-16 DIAGNOSIS — M67.472 GANGLION CYST OF LEFT FOOT: ICD-10-CM

## 2022-03-16 DIAGNOSIS — M62.461 GASTROCNEMIUS EQUINUS OF RIGHT LOWER EXTREMITY: ICD-10-CM

## 2022-03-16 PROCEDURE — 97140 MANUAL THERAPY 1/> REGIONS: CPT | Mod: GP

## 2022-03-16 PROCEDURE — 99213 OFFICE O/P EST LOW 20 MIN: CPT | Performed by: PODIATRIST

## 2022-03-16 ASSESSMENT — PAIN SCALES - GENERAL: PAINLEVEL: NO PAIN (0)

## 2022-03-16 NOTE — NURSING NOTE
"Chief Complaint   Patient presents with     Follow Up     plantar fascitis       Initial /74 (BP Location: Left arm, Cuff Size: Adult Large)   Pulse 100   Ht 1.6 m (5' 3\")   Wt 98.9 kg (218 lb)   SpO2 98%   BMI 38.62 kg/m   Estimated body mass index is 38.62 kg/m  as calculated from the following:    Height as of this encounter: 1.6 m (5' 3\").    Weight as of this encounter: 98.9 kg (218 lb).  Medication Reconciliation: complete  Roxann Shen LPN  "

## 2022-03-16 NOTE — PROGRESS NOTES
Chief complaint: Patient presents with:  Follow Up: plantar fascitis      History of Present Illness: This 41 year old female is seen for follow-up management of heel pain bilaterally and RIGHT ankle weakness.     She went through physical therapy at [facility] with Adali. She will see her one more time but she is feeling much better with no further pain from the plantar fascia or the sinus tarsi.     She is still wearing an OTC from [employer] and they are still comfortable. She had a RIGHT heel injection on 12/15/2022.     Lastly, she has a new bump that she noticed on her LEFT lateral dorsal foot. She noticed the bump after she tripped on her daughter's boot a month ago. She had some pain in that part of her foot after she fell, but the pain has since subsided. She does still notice a bump on the foot and she is wondering what it is and if she needs to treat it.    She wears Montague and Oofos sandals and Asics tennis shoes.      No further pedal complaints today.         Patient does not use tobacco products.     ----------------------------------------------------    History of foot pain:  Patient says the RIGHT foot is more commonly painful than the LEFT. However, she has a h/o pain in the RIGHT foot. Pain started around the summer of 2020 after she was at home more throughout the increased time at home when COVID started. She started wearing shoes around the house around the fall of 2021. She sometimes wore tennis shoes and other times she worse slippers. She wore Dr. Mccloud inserts but it didn't seem to make a difference.    Since the pain started and had been waxing and waning. The pain was pretty consistent and she has some sort of pain every day by September of 2021. She was doing some stretches and she tried to avoid having to come to the doctor's office.    Secondly, patient complained of ankle rolling. She rolled her ankle at age 18 and she had a bad ankle sprain around 2019. She also had three bad ankle  "rolls and multiple ankle rolls between  and . This had been occurring several times throughout the years.        /74 (BP Location: Left arm, Cuff Size: Adult Large)   Pulse 100   Ht 1.6 m (5' 3\")   Wt 98.9 kg (218 lb)   SpO2 98%   BMI 38.62 kg/m       Patient Active Problem List   Diagnosis     PCOS (polycystic ovarian syndrome)     Hirsutism     Elevated testosterone level in female     Ovarian hyperthecosis     History of pre-term labor     Hypothyroidism     Infertility, anovulation     PVC's (premature ventricular contractions)     H/O premature delivery     Bilateral carpal tunnel syndrome      (spontaneous vaginal delivery)     ACP (advance care planning)     Major depressive disorder, recurrent episode, moderate (H)     Gynecomastia, female     High risk HPV infection     Papanicolaou smear of cervix with low grade squamous intraepithelial lesion (LGSIL)     Encounter for other general counseling or advice on contraception     Hyperandrogenemia syndrome, female, post pubertal     Constipation     History of gestational diabetes       Past Surgical History:   Procedure Laterality Date     CARPAL TUNNEL RELEASE RT/LT Left 2022    [facility]     dental surgical procedure       LAPAROSCOPIC TUBAL DYE STUDY N/A 2014    Procedure: LAPAROSCOPIC TUBAL DYE STUDY;  Surgeon: Amanuel Purcell MD;  Location: [facility]     MAMMOPLASTY REDUCTION  10/19       Current Outpatient Medications   Medication     albuterol (PROAIR HFA/PROVENTIL HFA/VENTOLIN HFA) 108 (90 Base) MCG/ACT inhaler     budesonide (PULMICORT) 0.5 MG/2ML neb solution     buPROPion (WELLBUTRIN XL) 150 MG 24 hr tablet     buPROPion (WELLBUTRIN XL) 300 MG 24 hr tablet     montelukast (SINGULAIR) 10 MG tablet     norethindrone-ethinyl estradiol (MICROGESTIN ) 1-20 MG-MCG tablet     sertraline (ZOLOFT) 100 MG tablet     SYNTHROID 100 MCG tablet     No current facility-administered medications for this visit. "          Allergies   Allergen Reactions     Macrolides Hives     Macrolide antibiotics     Penicillins Hives     Sulfonamide Derivatives Hives     Sulfa       Family History   Problem Relation Age of Onset     Lipids Father         hyperlipidemia     Hypertension Father      Lipids Mother         hyperlipidemia     Lupus Mother         erythematosus     Diabetes Other      Thyroid Disease Other      Asthma No family hx of        Social History     Socioeconomic History     Marital status:      Spouse name: None     Number of children: None     Years of education: None     Highest education level: None   Occupational History     None   Tobacco Use     Smoking status: Never Smoker     Smokeless tobacco: Never Used   Substance and Sexual Activity     Alcohol use: No     Drug use: No     Sexual activity: Yes     Partners: Male   Other Topics Concern     Parent/sibling w/ CABG, MI or angioplasty before 65F 55M? No   Social History Narrative     None     Social Determinants of Health     Financial Resource Strain:      Difficulty of Paying Living Expenses:    Food Insecurity:      Worried About Running Out of Food in the Last Year:      Ran Out of Food in the Last Year:    Transportation Needs:      Lack of Transportation (Medical):      Lack of Transportation (Non-Medical):    Physical Activity:      Days of Exercise per Week:      Minutes of Exercise per Session:    Stress:      Feeling of Stress :    Social Connections:      Frequency of Communication with Friends and Family:      Frequency of Social Gatherings with Friends and Family:      Attends Hindu Services:      Active Member of Clubs or Organizations:      Attends Club or Organization Meetings:      Marital Status:    Intimate Partner Violence:      Fear of Current or Ex-Partner:      Emotionally Abused:      Physically Abused:      Sexually Abused:        ROS: 10 point ROS neg other than the symptoms noted above in the HPI.  EXAM  Constitutional:  healthy, alert and no distress    Psychiatric: mentation appears normal and affect normal/bright    VASCULAR:  -Dorsalis pedis pulse +2/4 b/l  -Posterior tibial pulse +2/4 b/l  -Capillary refill time < 3 seconds to b/l hallux  NEURO:  -Light touch sensation intact to b/l plantar forefoot  DERM:  -Skin temperature, texture and turgor WNL b/l  MSK:  -Small, semi-firm firm prominence with small area of softer, fluid-filled consistency palpation to the LEFT dorsal 5th TMTJ  -No further pain on palpation to the RIGHT sinus tarsi  -No further pain to the RIGHT peroneal brevis tendon from just just posterior to the lateral malleolus and extending to the insertion on the styloid process  -Mild pain on the bilateral medial tubercle of the calcaneus, RIGHT foot    -Muscle strength of ankles +5/5 for dorsiflexion, plantarflexion, ABDUction and ADDuction b/l  -Ankle joint passive ROM within normal limits except for dorsiflexion:    Dorsiflexion, RIGHT Straight knee 0 degrees    Dorsiflexion, LEFT Straight knee 0 degrees    -Mild laxity to the RIGHT ankle with mildly increased inversion of RIGHT ankle compared to LEFT   -Mild decrease in arch height while patient is NWB  ============================================================    ASSESSMENT:  (M76.71) Peroneal tendinitis of right lower extremity  (primary encounter diagnosis)    (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 Treatment options discussed with no educational barriers noted.  -The RIGHT foot metatarsalgia, RIGHT heel pain, and RIGHT sinus tarsi and ankle weakness have greatly improved. She has minimal pain remaining to the foot.  ---Continue PT exercises at home and aggressively stretch the bilateral calves.  ---Continue with Oofos shoes at home and tennis shoes during the day with OTC orthotics. Wear  supportive shoes more consistently if pain worsens and wear as often as possible otherwise to prevent future foot pain.    Ganglion cyst:  -Patient had a new complaint of a bump on the LEFT dorsal lateral foot:  -Discussed causes and treatments of ganglion cysts with patient. The cyst may grow, shrink, or remain the same size. The cyst may be aspirated in the office and a small steroid may be injected post aspiration in attempt to prevent recurrence, but the recurrence rate with this procedure is very high. The ganglion cyst may be surgically excised if it is causing pain, but this also has a higher recurrence rate. Since the patient's cyst is not not causing any pain, the patient has decided to leave the cyst alone.   -The cyst may have been there before the injury or it may have developed after she fell a month ago. She declined an x-ray to evaluate for the extent of a bone spur in this area of her foot versus a ganglion cyst.     -Patient in agreement with the above treatment plan and all of patient's questions were answered.      Return to clinic as needed        Nia Victoria DPM

## 2022-03-17 NOTE — TELEPHONE ENCOUNTER
Went over instructions with patient for allergy skin testing.  Reviewed patients current medications and patient will avoid all contraindicated medications prior to MQT testing.  Patient verbalizes understanding.  Copy of allergy testing packet will be mailed to the patient.  Patient is aware that she will be called to schedule her testing.  She is advised to call if he has any questions.    Atul Mayberry RN on 3/17/2022 at 2:51 PM

## 2022-03-23 ENCOUNTER — HOSPITAL ENCOUNTER (OUTPATIENT)
Dept: PHYSICAL THERAPY | Facility: OTHER | Age: 42
Discharge: HOME | End: 2022-03-23
Attending: PODIATRIST
Payer: COMMERCIAL

## 2022-03-23 PROCEDURE — 97140 MANUAL THERAPY 1/> REGIONS: CPT | Mod: GP

## 2022-04-11 DIAGNOSIS — E03.9 HYPOTHYROIDISM, UNSPECIFIED TYPE: ICD-10-CM

## 2022-04-12 RX ORDER — LEVOTHYROXINE SODIUM 100 MCG
TABLET ORAL
Qty: 90 TABLET | Refills: 0 | Status: SHIPPED | OUTPATIENT
Start: 2022-04-12 | End: 2022-07-18

## 2022-05-03 ENCOUNTER — OFFICE VISIT (OUTPATIENT)
Dept: OTOLARYNGOLOGY | Facility: OTHER | Age: 42
End: 2022-05-03
Attending: NURSE PRACTITIONER
Payer: COMMERCIAL

## 2022-05-03 ENCOUNTER — OFFICE VISIT (OUTPATIENT)
Dept: ALLERGY | Facility: OTHER | Age: 42
End: 2022-05-03
Attending: NURSE PRACTITIONER
Payer: COMMERCIAL

## 2022-05-03 VITALS
WEIGHT: 210 LBS | HEIGHT: 63 IN | TEMPERATURE: 98.5 F | BODY MASS INDEX: 37.21 KG/M2 | DIASTOLIC BLOOD PRESSURE: 81 MMHG | HEART RATE: 86 BPM | SYSTOLIC BLOOD PRESSURE: 121 MMHG | OXYGEN SATURATION: 98 %

## 2022-05-03 DIAGNOSIS — R05.9 COUGH: ICD-10-CM

## 2022-05-03 DIAGNOSIS — J30.89 PERENNIAL ALLERGIC RHINITIS: Primary | ICD-10-CM

## 2022-05-03 DIAGNOSIS — J32.9 CHRONIC SINUSITIS, UNSPECIFIED LOCATION: Primary | ICD-10-CM

## 2022-05-03 DIAGNOSIS — J45.20 MILD INTERMITTENT REACTIVE AIRWAY DISEASE WITHOUT COMPLICATION: ICD-10-CM

## 2022-05-03 PROCEDURE — 95004 PERQ TESTS W/ALRGNC XTRCS: CPT

## 2022-05-03 PROCEDURE — 99213 OFFICE O/P EST LOW 20 MIN: CPT | Mod: 25 | Performed by: NURSE PRACTITIONER

## 2022-05-03 PROCEDURE — 95024 IQ TESTS W/ALLERGENIC XTRCS: CPT

## 2022-05-03 ASSESSMENT — PAIN SCALES - GENERAL: PAINLEVEL: NO PAIN (0)

## 2022-05-03 NOTE — PROGRESS NOTES
Otolaryngology Note         Chief Complaint:     Patient presents with:  Sinusitis: Acute, recurrent            History of Present Illness:     Etta Obrien is a 41 year old female seen today for follow up MQT.      She did well with allergy testing.  No oral swelling, excessive itching, or wheezing.      MQT completed 5/3/2022:  Dilution 6 (high) none  Dilution 5 (moderate) grass, Alternaria, cat  Dilution 2 (low) ragweed, pigweed, elm, cary, walnuts, Hormodendrum, penicillium, Epicoccum, dust    I last saw Etta on 2/25/2022 for cough and a tickle in her throat.  Nasal endoscopy showed moderately enlarged turbinates with copious amounts of clear drainage in the nasopharynx, otherwise normal.  He was started on Singulair 10 mg daily.  She reports she has had significant improvement in her symptoms since starting on Singulair.  She is very happy with her outcome at this time.  Like to continue on Singulair.  She is not interested in immunotherapy at this point.         Medications:     Current Outpatient Rx   Medication Sig Dispense Refill     albuterol (PROAIR HFA/PROVENTIL HFA/VENTOLIN HFA) 108 (90 Base) MCG/ACT inhaler Inhale 1-2 puffs into the lungs every 4 hours as needed for shortness of breath / dyspnea or wheezing 18 g 0     budesonide (PULMICORT) 0.5 MG/2ML neb solution Mix 240 ml Dallin Med Sinus rinse, add 0.5 mg budesonide vial, rinse 1/2 bottle in each nostril twice daily 60 mL 4     buPROPion (WELLBUTRIN XL) 150 MG 24 hr tablet TAKE 1 TABLET BY MOUTH EVERY MORNING WITH 300 MG TABLET= 450 MG EVERY MORNING 90 tablet 3     buPROPion (WELLBUTRIN XL) 300 MG 24 hr tablet TAKE 1 TABLET BY MOUTH EVERY MORNING WITH 150 MG TABLET= 450 MG EVERY MORNING 90 tablet 3     montelukast (SINGULAIR) 10 MG tablet Take 1 tablet (10 mg) by mouth At Bedtime 30 tablet 3     norethindrone-ethinyl estradiol (MICROGESTIN 1/20) 1-20 MG-MCG tablet TAKE 1 TABLET BY MOUTH EVERY DAY- SKIPPING PLACEBO 84 tablet 3     sertraline (ZOLOFT)  "100 MG tablet TAKE 1 TABLET(100 MG) BY MOUTH DAILY 90 tablet 3     SYNTHROID 100 MCG tablet TAKE 1 TABLET BY MOUTH EVERY DAY 90 tablet 0            Allergies:     Allergies: Macrolides, Penicillins, and Sulfonamide derivatives          Past Medical History:     Past Medical History:   Diagnosis Date     Constipation, chronic      Gestational diabetes      Hirsutism      PCOS (polycystic ovarian syndrome)      Spina bifida occulta      Unspecified hypothyroidism             Past Surgical History:     Past Surgical History:   Procedure Laterality Date     CARPAL TUNNEL RELEASE RT/LT Left 01/25/2022    [facility]     dental surgical procedure       LAPAROSCOPIC TUBAL DYE STUDY N/A 09/03/2014    Procedure: LAPAROSCOPIC TUBAL DYE STUDY;  Surgeon: Amanuel Purcell MD;  Location: [facility]     MAMMOPLASTY REDUCTION  10/19       ENT family history reviewed         Social History:     Social History     Tobacco Use     Smoking status: Never Smoker     Smokeless tobacco: Never Used   Substance Use Topics     Alcohol use: No     Drug use: No            Review of Systems:     ROS: See HPI         Physical Exam:     /81 (BP Location: Right arm, Patient Position: Sitting, Cuff Size: Adult Regular)   Pulse 86   Temp 98.5  F (36.9  C) (Oral)   Ht 1.6 m (5' 3\")   Wt 95.3 kg (210 lb)   SpO2 98%   BMI 37.20 kg/m      General - The patient is well nourished and well developed, and appears to have good nutritional status.  Alert and oriented to person and place, answers questions and cooperates with examination appropriately.   Head and Face - Normocephalic and atraumatic, with no gross asymmetry noted.  The facial nerve is intact, with strong symmetric movements.  Voice and Breathing - The patient was breathing comfortably without the use of accessory muscles. There was no wheezing, stridor. The patients voice was clear and strong, and had appropriate pitch and quality.  Ears - External ear normal. Canals are " patent. Right tympanic membrane is intact without effusion, retraction or mass. Left tympanic membrane is intact without effusion, retraction or mass.  Eyes - Extraocular movements intact, sclera were not icteric or injected, conjunctiva were pink and moist.  Mouth - Examination of the oral cavity showed pink, healthy oral mucosa. Dentition in good condition. No lesions or ulcerations noted. The tongue was mobile and midline.   Throat - The walls of the oropharynx were smooth, pink, moist, symmetric, and had no lesions or ulcerations.  The tonsillar pillars and soft palate were symmetric. The uvula was midline on elevation.    Neck - no palpable lymphadenopathy palpation of the thyroid was soft and smooth, with no nodules or goiter appreciated.  The trachea was mobile and midline.  Nose - External contour is symmetric, no gross deflection or scars.  Nasal mucosa is pink and moist with no abnormal mucus.  The septum and turbinates were evaluated with nasal speculum, no polyps, masses, or purulence noted on examination.         Assessment and Plan:       ICD-10-CM    1. Perennial allergic rhinitis  J30.89    2. Cough  R05.9    3. Mild intermittent reactive airway disease without complication  J45.20      Follow up as needed     Allergy avoidance as able    Follow-up if symptoms worsen or concerns develop.    Winsome CATALAN  [facility]NT

## 2022-05-03 NOTE — PATIENT INSTRUCTIONS
Follow up as needed     Allergy avoidance as able      Thank you for allowing Winsome CATALAN and our ENT team to participate in your care.  If your medications are too expensive, please call my nurse at the number listed below.  We can possibly change this medication.    If you have a scheduling or an appointment question please contact our Health Unit Coordinator at their direct line [phone] ext [phone]  ALL nursing questions or concerns can be directed to my Nurse Eleni [phone].

## 2022-05-03 NOTE — PROGRESS NOTES
Etta was seen for allergy skin testing. Patient was seen by this nurse in conjunction with ENT provider. All encounter details are documented in ENT Provider's appointment from this same date. Please see referenced encounter for this visits documentation.     Atul Mayberry RN on 5/3/2022 at 10:14 AM

## 2022-05-03 NOTE — LETTER
5/3/2022         RE: Etta Obrien  [address] [facility] [id number]        Dear Colleague,    Thank you for referring your patient, Etta Obrien, to the [facility]. Please see a copy of my visit note below.    Otolaryngology Note         Chief Complaint:     Patient presents with:  Sinusitis: Acute, recurrent            History of Present Illness:     Etta Obrien is a 41 year old female seen today for follow up MQT.      She did well with allergy testing.  No oral swelling, excessive itching, or wheezing.      MQT completed 5/3/2022:  Dilution 6 (high) none  Dilution 5 (moderate) grass, Alternaria, cat  Dilution 2 (low) ragweed, pigweed, elm, cary, walnuts, Hormodendrum, penicillium, Epicoccum, dust    I last saw Etta on 2/25/2022 for cough and a tickle in her throat.  Nasal endoscopy showed moderately enlarged turbinates with copious amounts of clear drainage in the nasopharynx, otherwise normal.  He was started on Singulair 10 mg daily.  She reports she has had significant improvement in her symptoms since starting on Singulair.  She is very happy with her outcome at this time.  Like to continue on Singulair.  She is not interested in immunotherapy at this point.         Medications:     Current Outpatient Rx   Medication Sig Dispense Refill     albuterol (PROAIR HFA/PROVENTIL HFA/VENTOLIN HFA) 108 (90 Base) MCG/ACT inhaler Inhale 1-2 puffs into the lungs every 4 hours as needed for shortness of breath / dyspnea or wheezing 18 g 0     budesonide (PULMICORT) 0.5 MG/2ML neb solution Mix 240 ml Dallin Med Sinus rinse, add 0.5 mg budesonide vial, rinse 1/2 bottle in each nostril twice daily 60 mL 4     buPROPion (WELLBUTRIN XL) 150 MG 24 hr tablet TAKE 1 TABLET BY MOUTH EVERY MORNING WITH 300 MG TABLET= 450 MG EVERY MORNING 90 tablet 3     buPROPion (WELLBUTRIN XL) 300 MG 24 hr tablet TAKE 1 TABLET BY MOUTH EVERY MORNING WITH 150 MG TABLET= 450 MG EVERY MORNING 90 tablet 3      "montelukast (SINGULAIR) 10 MG tablet Take 1 tablet (10 mg) by mouth At Bedtime 30 tablet 3     norethindrone-ethinyl estradiol (MICROGESTIN 1/20) 1-20 MG-MCG tablet TAKE 1 TABLET BY MOUTH EVERY DAY- SKIPPING PLACEBO 84 tablet 3     sertraline (ZOLOFT) 100 MG tablet TAKE 1 TABLET(100 MG) BY MOUTH DAILY 90 tablet 3     SYNTHROID 100 MCG tablet TAKE 1 TABLET BY MOUTH EVERY DAY 90 tablet 0            Allergies:     Allergies: Macrolides, Penicillins, and Sulfonamide derivatives          Past Medical History:     Past Medical History:   Diagnosis Date     Constipation, chronic      Gestational diabetes      Hirsutism      PCOS (polycystic ovarian syndrome)      Spina bifida occulta      Unspecified hypothyroidism             Past Surgical History:     Past Surgical History:   Procedure Laterality Date     CARPAL TUNNEL RELEASE RT/LT Left 01/25/2022    [facility]     dental surgical procedure       LAPAROSCOPIC TUBAL DYE STUDY N/A 09/03/2014    Procedure: LAPAROSCOPIC TUBAL DYE STUDY;  Surgeon: Amanuel Purcell MD;  Location: [facility]     MAMMOPLASTY REDUCTION  10/19       ENT family history reviewed         Social History:     Social History     Tobacco Use     Smoking status: Never Smoker     Smokeless tobacco: Never Used   Substance Use Topics     Alcohol use: No     Drug use: No            Review of Systems:     ROS: See HPI         Physical Exam:     /81 (BP Location: Right arm, Patient Position: Sitting, Cuff Size: Adult Regular)   Pulse 86   Temp 98.5  F (36.9  C) (Oral)   Ht 1.6 m (5' 3\")   Wt 95.3 kg (210 lb)   SpO2 98%   BMI 37.20 kg/m      General - The patient is well nourished and well developed, and appears to have good nutritional status.  Alert and oriented to person and place, answers questions and cooperates with examination appropriately.   Head and Face - Normocephalic and atraumatic, with no gross asymmetry noted.  The facial nerve is intact, with strong symmetric " movements.  Voice and Breathing - The patient was breathing comfortably without the use of accessory muscles. There was no wheezing, stridor. The patients voice was clear and strong, and had appropriate pitch and quality.  Ears - External ear normal. Canals are patent. Right tympanic membrane is intact without effusion, retraction or mass. Left tympanic membrane is intact without effusion, retraction or mass.  Eyes - Extraocular movements intact, sclera were not icteric or injected, conjunctiva were pink and moist.  Mouth - Examination of the oral cavity showed pink, healthy oral mucosa. Dentition in good condition. No lesions or ulcerations noted. The tongue was mobile and midline.   Throat - The walls of the oropharynx were smooth, pink, moist, symmetric, and had no lesions or ulcerations.  The tonsillar pillars and soft palate were symmetric. The uvula was midline on elevation.    Neck - no palpable lymphadenopathy palpation of the thyroid was soft and smooth, with no nodules or goiter appreciated.  The trachea was mobile and midline.  Nose - External contour is symmetric, no gross deflection or scars.  Nasal mucosa is pink and moist with no abnormal mucus.  The septum and turbinates were evaluated with nasal speculum, no polyps, masses, or purulence noted on examination.         Assessment and Plan:       ICD-10-CM    1. Perennial allergic rhinitis  J30.89    2. Cough  R05.9    3. Mild intermittent reactive airway disease without complication  J45.20      Follow up as needed     Allergy avoidance as able    Follow-up if symptoms worsen or concerns develop.    Winsome CATALAN  [facility]NT            Again, thank you for allowing me to participate in the care of your patient.        Sincerely,        Winsome Gross NP

## 2022-05-03 NOTE — NURSING NOTE
"Chief Complaint   Patient presents with     Sinusitis     Acute, recurrent        Initial /81 (BP Location: Right arm, Patient Position: Sitting, Cuff Size: Adult Regular)   Pulse 86   Temp 98.5  F (36.9  C) (Oral)   Ht 1.6 m (5' 3\")   Wt 95.3 kg (210 lb)   SpO2 98%   BMI 37.20 kg/m   Estimated body mass index is 37.2 kg/m  as calculated from the following:    Height as of this encounter: 1.6 m (5' 3\").    Weight as of this encounter: 95.3 kg (210 lb).  Medication Reconciliation:     Atul Mayberry RN    This patient presents today for allergy skin testing.      Symptoms have included chronic sinusitis, she had two episodes last Nov and January and needed antibiotics.  Symptoms are pretty much constant and year round, she said it never feels like it goes away.  She has upper jaw and teeth pain, a tickle in her throat, morning congestion that improves as the day progresses, and PND that leads to coughing fits.    No asthma, she has albuterol but only used it when she had a sinus infection.  No h/o COM or surgeries.  No skin symptoms. No past sinus surgeries.  She had strep throat as a child, but no surgeries.  She said the Singulair has improved her symptoms.  She uses the Budesonide rinses, but not daily.    This patient lives in a single family home, with a basement.  No mold, water or moisture issues in the home.  There is carpet in the home, and also in the bedroom.  Home has forced air heat and central air conditioning.        This patient has 1 dog that occasionally sleeps with her.    No previous had allergy testing.    This patient's medications have been reviewed prior to testing and all appropriate medications have been stopped.    Consent is signed by patient and signature is verified.     MQT/ID test is performed per protocol.  The patient tolerated testing well.  Benadryl gel used for post test itch per protocol.  Chewable Claritin 10 mg given for post test itch per protocol.  All findings " are recorded on the paper flow sheet. Results are reviewed with this patient.  They are given written information regarding allergy.       The patient will follow-up with Winsome Gross CNP for treatment plan.      Atul Mayberry RN on 5/3/2022 at 10:15 AM

## 2022-05-04 NOTE — PROGRESS NOTES
[facility] Rehabilitation Service    Outpatient Physical Therapy Discharge Note  Patient: Etta Obrien  : 1980    Beginning/End Dates of Reporting Period:  22 to 3/23/22    Referring Provider: Dr. Victoria    Therapy Diagnosis: Peroneal tendinitis, sinus Tarsi syndrome right foot, ligamentous laxity right ankle, pes planus bilateral feet, right foot pain     Client Self Report: Pt notes minimal pain now w going down stairs. Overall , she reports she doesnt remember the last time she felt this good.    Objective Measurements:  Objective Measure: palpation  Details: No further tenderness along the R lateral ankle. minimal  tenderness R dorsal lateral foot along the 4th and 5 th MT's. Mild remaining soft tissue tension and tenderness along the R plantar fascia  Objective Measure: Ankle ROM  Details: DF w knee ext = 0-14, DF w knee flex = WNL                              Outcome Measures (most recent score):      Goals:  Goal Identifier STG 1   Goal Description Increased ankle range of motion by 5 degrees   Target Date 22   Date Met  22   Progress (detail required for progress note):       Goal Identifier LTG 1   Goal Description Patient will be able to walk up to 1 mile with no difficulty 75% of the time   Target Date 22   Date Met      Progress (detail required for progress note):       Goal Identifier LTG 2   Goal Description Patient will demonstrate independence with home exercise program   Target Date 22   Date Met      Progress (detail required for progress note):       Goal Identifier     Goal Description     Target Date     Date Met      Progress (detail required for progress note):       Goal Identifier     Goal Description     Target Date     Date Met      Progress (detail required for progress note):       Goal Identifier     Goal Description     Target Date     Date Met      Progress  (detail required for progress note):       Goal Identifier     Goal Description     Target Date     Date Met      Progress (detail required for progress note):       Goal Identifier     Goal Description     Target Date     Date Met      Progress (detail required for progress note):             Plan:  Discharge from therapy.    Discharge:    Reason for Discharge: Patient has met all goals.    Equipment Issued: none    Discharge Plan: Patient to continue home program.

## 2022-05-06 ENCOUNTER — OFFICE VISIT (OUTPATIENT)
Dept: FAMILY MEDICINE | Facility: OTHER | Age: 42
End: 2022-05-06
Attending: FAMILY MEDICINE
Payer: COMMERCIAL

## 2022-05-06 VITALS
WEIGHT: 219.4 LBS | BODY MASS INDEX: 38.88 KG/M2 | HEART RATE: 88 BPM | DIASTOLIC BLOOD PRESSURE: 70 MMHG | SYSTOLIC BLOOD PRESSURE: 126 MMHG | RESPIRATION RATE: 16 BRPM | HEIGHT: 63 IN | TEMPERATURE: 97.6 F | OXYGEN SATURATION: 98 %

## 2022-05-06 DIAGNOSIS — F33.1 MAJOR DEPRESSIVE DISORDER, RECURRENT EPISODE, MODERATE (H): Primary | ICD-10-CM

## 2022-05-06 DIAGNOSIS — E03.9 HYPOTHYROIDISM, UNSPECIFIED TYPE: ICD-10-CM

## 2022-05-06 DIAGNOSIS — E66.01 MORBID OBESITY (H): ICD-10-CM

## 2022-05-06 LAB — TSH SERPL DL<=0.005 MIU/L-ACNC: 0.57 MU/L (ref 0.4–4)

## 2022-05-06 PROCEDURE — 99214 OFFICE O/P EST MOD 30 MIN: CPT | Performed by: FAMILY MEDICINE

## 2022-05-06 PROCEDURE — 36415 COLL VENOUS BLD VENIPUNCTURE: CPT | Performed by: FAMILY MEDICINE

## 2022-05-06 PROCEDURE — 84443 ASSAY THYROID STIM HORMONE: CPT | Performed by: FAMILY MEDICINE

## 2022-05-06 RX ORDER — SERTRALINE HYDROCHLORIDE 100 MG/1
150 TABLET, FILM COATED ORAL DAILY
Qty: 135 TABLET | Refills: 3 | Status: SHIPPED | OUTPATIENT
Start: 2022-05-06 | End: 2022-10-07

## 2022-05-06 ASSESSMENT — ANXIETY QUESTIONNAIRES
5. BEING SO RESTLESS THAT IT IS HARD TO SIT STILL: NOT AT ALL
4. TROUBLE RELAXING: NOT AT ALL
3. WORRYING TOO MUCH ABOUT DIFFERENT THINGS: NOT AT ALL
6. BECOMING EASILY ANNOYED OR IRRITABLE: SEVERAL DAYS
1. FEELING NERVOUS, ANXIOUS, OR ON EDGE: SEVERAL DAYS
7. FEELING AFRAID AS IF SOMETHING AWFUL MIGHT HAPPEN: NOT AT ALL
IF YOU CHECKED OFF ANY PROBLEMS ON THIS QUESTIONNAIRE, HOW DIFFICULT HAVE THESE PROBLEMS MADE IT FOR YOU TO DO YOUR WORK, TAKE CARE OF THINGS AT HOME, OR GET ALONG WITH OTHER PEOPLE: NOT DIFFICULT AT ALL
GAD7 TOTAL SCORE: 2
2. NOT BEING ABLE TO STOP OR CONTROL WORRYING: NOT AT ALL

## 2022-05-06 ASSESSMENT — ASTHMA QUESTIONNAIRES
QUESTION_2 LAST FOUR WEEKS HOW OFTEN HAVE YOU HAD SHORTNESS OF BREATH: NOT AT ALL
QUESTION_4 LAST FOUR WEEKS HOW OFTEN HAVE YOU USED YOUR RESCUE INHALER OR NEBULIZER MEDICATION (SUCH AS ALBUTEROL): NOT AT ALL
QUESTION_3 LAST FOUR WEEKS HOW OFTEN DID YOUR ASTHMA SYMPTOMS (WHEEZING, COUGHING, SHORTNESS OF BREATH, CHEST TIGHTNESS OR PAIN) WAKE YOU UP AT NIGHT OR EARLIER THAN USUAL IN THE MORNING: ONCE A WEEK
QUESTION_5 LAST FOUR WEEKS HOW WOULD YOU RATE YOUR ASTHMA CONTROL: WELL CONTROLLED
ACT_TOTALSCORE: 22
ACT_TOTALSCORE: 22
QUESTION_1 LAST FOUR WEEKS HOW MUCH OF THE TIME DID YOUR ASTHMA KEEP YOU FROM GETTING AS MUCH DONE AT WORK, SCHOOL OR AT HOME: NONE OF THE TIME

## 2022-05-06 ASSESSMENT — PATIENT HEALTH QUESTIONNAIRE - PHQ9: SUM OF ALL RESPONSES TO PHQ QUESTIONS 1-9: 12

## 2022-05-06 ASSESSMENT — PAIN SCALES - GENERAL: PAINLEVEL: NO PAIN (0)

## 2022-05-06 NOTE — NURSING NOTE
"Chief Complaint   Patient presents with     Asthma     Depression       Initial /70 (BP Location: Right arm, Patient Position: Sitting, Cuff Size: Adult Large)   Pulse 88   Temp 97.6  F (36.4  C) (Tympanic)   Resp 16   Ht 1.6 m (5' 3\")   Wt 99.5 kg (219 lb 6.4 oz)   SpO2 98%   BMI 38.86 kg/m   Estimated body mass index is 38.86 kg/m  as calculated from the following:    Height as of this encounter: 1.6 m (5' 3\").    Weight as of this encounter: 99.5 kg (219 lb 6.4 oz).  Medication Reconciliation: complete  Lulu Angeles MA  "

## 2022-05-06 NOTE — PROGRESS NOTES
"  Assessment & Plan     1. Major depressive disorder, recurrent episode, moderate (H)  Will increase sertraline to 150 mg daily, follow-up in 4-6 weeks for medication review.  - sertraline (ZOLOFT) 100 MG tablet; Take 1.5 tablets (150 mg) by mouth daily  Dispense: 135 tablet; Refill: 3    2. Hypothyroidism, unspecified type  Will recheck levels just to be sure this isn't playing a part of symptoms.  - TSH with free T4 reflex; Future  - TSH with free T4 reflex    3. Morbid obesity (H)  Patient would like to start working with weight management, will refer over to Kiersten Oden.  - Comprehensive Weight Management Referral       BMI:   Estimated body mass index is 38.86 kg/m  as calculated from the following:    Height as of this encounter: 1.6 m (5' 3\").    Weight as of this encounter: 99.5 kg (219 lb 6.4 oz).     Return in about 4 weeks (around 6/3/2022) for Medication review.    Ashley Allen MD  [facility] [place] KAMLESH Quiles is a 41 year old who presents for the following health issues     HPI     Depression and Anxiety Follow-Up    How are you doing with your depression since your last visit? Worsened feeling down    How are you doing with your anxiety since your last visit?  No change    Are you having other symptoms that might be associated with depression or anxiety? No    Have you had a significant life event? No     Do you have any concerns with your use of alcohol or other drugs? No     Patient notes significant anhedonia and decreased motivation.  She is also frustrated by her weight and admits to the tumbling cycle of being less active, gaining weight, worsening depression, less activity, etc.    Social History     Tobacco Use     Smoking status: Never Smoker     Smokeless tobacco: Never Used   Substance Use Topics     Alcohol use: No     Drug use: No     PHQ 9/1/2020 9/23/2021 5/6/2022   PHQ-9 Total Score 6 9 12   Q9: Thoughts of better off dead/self-harm past 2 weeks Not at " all Not at all Not at all     ARNALDO-7 SCORE 9/1/2020 9/23/2021 5/6/2022   Total Score 5 2 2     Last PHQ-9 5/6/2022   1.  Little interest or pleasure in doing things 2   2.  Feeling down, depressed, or hopeless 1   3.  Trouble falling or staying asleep, or sleeping too much 3   4.  Feeling tired or having little energy 3   5.  Poor appetite or overeating 2   6.  Feeling bad about yourself 0   7.  Trouble concentrating 1   8.  Moving slowly or restless 0   Q9: Thoughts of better off dead/self-harm past 2 weeks 0   PHQ-9 Total Score 12   Difficulty at work, home, or with people Somewhat difficult     ARNALDO-7  5/6/2022   1. Feeling nervous, anxious, or on edge 1   2. Not being able to stop or control worrying 0   3. Worrying too much about different things 0   4. Trouble relaxing 0   5. Being so restless that it is hard to sit still 0   6. Becoming easily annoyed or irritable 1   7. Feeling afraid, as if something awful might happen 0   ARNALDO-7 Total Score 2   If you checked any problems, how difficult have they made it for you to do your work, take care of things at home, or get along with other people? Not difficult at all       Suicide Assessment Five-step Evaluation and Treatment (SAFE-T)      Asthma (ACTUALLY ALLERGIES) Follow-Up    Was ACT completed today?    Yes    ACT Total Scores 5/6/2022   ACT TOTAL SCORE (Goal Greater than or Equal to 20) 22   In the past 12 months, how many times did you visit the emergency room for your asthma without being admitted to the hospital? 0   In the past 12 months, how many times were you hospitalized overnight because of your asthma? 0          How many days per week do you miss taking your asthma controller medication?  0    Please describe any recent triggers for your asthma: dust mites, pollens and animal dander    Have you had any Emergency Room Visits, Urgent Care Visits, or Hospital Admissions since your last office visit?  No          Review of Systems   Constitutional, HEENT,  "cardiovascular, pulmonary, gi and gu systems are negative, except as otherwise noted.      Objective    /70 (BP Location: Right arm, Patient Position: Sitting, Cuff Size: Adult Large)   Pulse 88   Temp 97.6  F (36.4  C) (Tympanic)   Resp 16   Ht 1.6 m (5' 3\")   Wt 99.5 kg (219 lb 6.4 oz)   SpO2 98%   BMI 38.86 kg/m    Body mass index is 38.86 kg/m .  Physical Exam   GENERAL: healthy, alert and no distress  PSYCH: mentation appears normal and affect tearful            "

## 2022-05-07 ASSESSMENT — ANXIETY QUESTIONNAIRES: GAD7 TOTAL SCORE: 2

## 2022-06-13 NOTE — PROGRESS NOTES
Etta Obrien is presenting for evaluation of medical weight management. She has had weight problems for for her entire life. Was overweight as a child. Maximum weight is 222 pounds when she was pregnant. She would like to lose 40 pounds. Goal weight is around 170 pounds. Other methods the patient has tried to lose weight include protein shakes, calorie restriction, and low carb.     Processed Foods: often  Alcohol: none  Soda/pop: diet coke once daily  Meals Prepared by: self  Psychologic issues: worse, feels this is due to her weight gain, does not feel suicidal, taking sertraline with Wellbutrin   Family Support: fair-boyfriend in [place]  Activity/Exercise: poor  Insight/Motivation: good, wanting to feel better, feels her depression will improve  Sleep habits: always tired, feels it is related to her weight, does not snore  Cravings: carbs  Appetite: good  Medications contributing to weight issues: sertraline may cause some weight gain. Wellbutrin should help with weight loss.     Co-morbidities   Patient Active Problem List   Diagnosis     PCOS (polycystic ovarian syndrome)     Hirsutism     Elevated testosterone level in female     Ovarian hyperthecosis     History of pre-term labor     Hypothyroidism     Infertility, anovulation     PVC's (premature ventricular contractions)     H/O premature delivery     Bilateral carpal tunnel syndrome      (spontaneous vaginal delivery)     ACP (advance care planning)     Major depressive disorder, recurrent episode, moderate (H)     Gynecomastia, female     High risk HPV infection     Papanicolaou smear of cervix with low grade squamous intraepithelial lesion (LGSIL)     Encounter for other general counseling or advice on contraception     Hyperandrogenemia syndrome, female, post pubertal     Constipation     History of gestational diabetes        Meds:     Current Outpatient Medications   Medication     albuterol (PROAIR HFA/PROVENTIL HFA/VENTOLIN HFA) 108 (90 Base)  "MCG/ACT inhaler     budesonide (PULMICORT) 0.5 MG/2ML neb solution     buPROPion (WELLBUTRIN XL) 150 MG 24 hr tablet     buPROPion (WELLBUTRIN XL) 300 MG 24 hr tablet     montelukast (SINGULAIR) 10 MG tablet     norethindrone-ethinyl estradiol (MICROGESTIN 1/20) 1-20 MG-MCG tablet     sertraline (ZOLOFT) 100 MG tablet     SYNTHROID 100 MCG tablet     No current facility-administered medications for this visit.       No current facility-administered medications for this visit.     Current Eating Patterns:   Generally 3 meals per day.   Breakfast: cereal, pancakes and fruit  Midmorning snack: greek yogurt with fruit or granola bar or popcorn  Lunch: left-overs or sandwich  Mid afternoon snack: veggies and Ranch  Supper: meat and potatoes and veggies  Grazing: none    OBJECTIVE:   /80   Pulse 89   Temp 98.1  F (36.7  C)   Ht 1.6 m (5' 3\")   Wt 97.1 kg (214 lb)   SpO2 98%   BMI 37.91 kg/m      Physical Exam   Exam:  Constitutional: obese, alert and no distress  Psychiatric: mentation appears normal and affect normal/bright      ASSESSMENT:   -Obesity Stage 2. Body mass index is 37.91.  -Hypothyroidism-TSH was recently normal on 5/6/22.   -Fasting glucose was 93 on 9/23/21.   -No recent Vit D. Will check today.   -She does have PCOS. Weight loss may help with symptoms associated with PCOS.   -She does have depression; controlled with sertraline and Wellbutrin. She feels this will improve with weight loss. Currently does not like the way she looks and does not want to leave her home.     NECK 14.5\"   RIGHT ARM 12.125\"    LEFT ARM 12.125\"  WAIST 43.125\"  HIPS 48.5\"  RIGHT THIGH 23\"  LEFT THIGH 23\"      Protein Goal: 70-90 grams  Carb Limit: less than 50 grams   Fat Goal: 60-70% of calories  Calorie Goal: 1200    PLAN: Initiated a low carb diet. Will avoid higher carb foods. Went over protein/carbohydrate/fat recommendations. Reminded patient to focus on getting appropriate amounts of protein. Discussed the " rational for eating higher fat. This equates to around 60- 70% fat. Limit or avoid sugar, starches, bread products, rice and cereal, most grains and simple carbohydrates. Goal set for 6 lb weight loss over the next 4 weeks. F/U in 4 weeks with myself.

## 2022-06-14 ENCOUNTER — OFFICE VISIT (OUTPATIENT)
Dept: FAMILY MEDICINE | Facility: OTHER | Age: 42
End: 2022-06-14
Attending: FAMILY MEDICINE
Payer: COMMERCIAL

## 2022-06-14 VITALS
TEMPERATURE: 98.1 F | HEIGHT: 63 IN | HEART RATE: 89 BPM | WEIGHT: 214 LBS | SYSTOLIC BLOOD PRESSURE: 112 MMHG | OXYGEN SATURATION: 98 % | BODY MASS INDEX: 37.92 KG/M2 | DIASTOLIC BLOOD PRESSURE: 80 MMHG

## 2022-06-14 DIAGNOSIS — E66.01 MORBID OBESITY (H): Primary | ICD-10-CM

## 2022-06-14 DIAGNOSIS — Z71.3 DIETARY COUNSELING AND SURVEILLANCE: ICD-10-CM

## 2022-06-14 DIAGNOSIS — F33.1 MAJOR DEPRESSIVE DISORDER, RECURRENT EPISODE, MODERATE (H): ICD-10-CM

## 2022-06-14 DIAGNOSIS — E28.2 PCOS (POLYCYSTIC OVARIAN SYNDROME): ICD-10-CM

## 2022-06-14 DIAGNOSIS — E03.9 HYPOTHYROIDISM, UNSPECIFIED TYPE: ICD-10-CM

## 2022-06-14 PROCEDURE — 82306 VITAMIN D 25 HYDROXY: CPT | Performed by: NURSE PRACTITIONER

## 2022-06-14 PROCEDURE — 99213 OFFICE O/P EST LOW 20 MIN: CPT | Performed by: NURSE PRACTITIONER

## 2022-06-14 PROCEDURE — 36415 COLL VENOUS BLD VENIPUNCTURE: CPT | Performed by: NURSE PRACTITIONER

## 2022-06-14 ASSESSMENT — PAIN SCALES - GENERAL: PAINLEVEL: NO PAIN (0)

## 2022-06-14 NOTE — NURSING NOTE
"Chief Complaint   Patient presents with     Weight Check       Initial /80   Pulse 89   Temp 98.1  F (36.7  C)   Ht 1.6 m (5' 3\")   Wt 97.1 kg (214 lb)   SpO2 98%   BMI 37.91 kg/m   Estimated body mass index is 37.91 kg/m  as calculated from the following:    Height as of this encounter: 1.6 m (5' 3\").    Weight as of this encounter: 97.1 kg (214 lb).  Medication Reconciliation: complete  Dick Weir LPN  "

## 2022-06-15 LAB — DEPRECATED CALCIDIOL+CALCIFEROL SERPL-MC: 26 UG/L (ref 20–75)

## 2022-06-15 RX ORDER — CHOLECALCIFEROL (VITAMIN D3) 50 MCG
2 TABLET ORAL DAILY
COMMUNITY
Start: 2022-06-15

## 2022-07-14 DIAGNOSIS — E03.9 HYPOTHYROIDISM, UNSPECIFIED TYPE: ICD-10-CM

## 2022-07-18 RX ORDER — LEVOTHYROXINE SODIUM 100 MCG
TABLET ORAL
Qty: 90 TABLET | Refills: 2 | Status: SHIPPED | OUTPATIENT
Start: 2022-07-18 | End: 2023-04-07

## 2022-07-20 NOTE — PROGRESS NOTES
Etta Obrien is presenting for follow up of medical weight management. She was initially seen on 22. Weight was 214 pounds. Weight today is 209 pounds. Down 5 pounds in the past 4 weeks. Overall, she feels better.     Energy levels are slowly improving.  Perceived compliance with lifestyle changes has been good.   Appetite/Hunger issues: controlled  Goal: 170 pounds.   New issues: none.   ?  Current Eating Patterns: good. Generally 3 meals per day.  Breakfast: 1-2 Premier Protein Drink   Lunch: salad with Ranch or Poppy Seed   Supper: meat and veggies  Snacks: string cheese, Light and Fit Yogurt, veggie and dip, egg omelets  Grazing: none  Processed Foods: rare   Alcohol: none   Wasted Calories: rare   Supplements: Vit D, will start a multivitamin    Meals Prepared by: self  Nutrition: lower carb, higher protein  ?  Diet diary reviewed and estimated daily caloric intake is 1484-0438.  ?  Activity/Exercise: Compliance has been poor, plans to begin walking more.     Co-morbidities:   Patient Active Problem List   Diagnosis     PCOS (polycystic ovarian syndrome)     Hirsutism     Elevated testosterone level in female     Ovarian hyperthecosis     History of pre-term labor     Hypothyroidism     Infertility, anovulation     PVC's (premature ventricular contractions)     H/O premature delivery     Bilateral carpal tunnel syndrome      (spontaneous vaginal delivery)     ACP (advance care planning)     Major depressive disorder, recurrent episode, moderate (H)     Gynecomastia, female     High risk HPV infection     Papanicolaou smear of cervix with low grade squamous intraepithelial lesion (LGSIL)     Encounter for other general counseling or advice on contraception     Hyperandrogenemia syndrome, female, post pubertal     Constipation     History of gestational diabetes     Meds:   Current Outpatient Medications   Medication     albuterol (PROAIR HFA/PROVENTIL HFA/VENTOLIN HFA) 108 (90 Base) MCG/ACT inhaler      budesonide (PULMICORT) 0.5 MG/2ML neb solution     buPROPion (WELLBUTRIN XL) 150 MG 24 hr tablet     buPROPion (WELLBUTRIN XL) 300 MG 24 hr tablet     montelukast (SINGULAIR) 10 MG tablet     norethindrone-ethinyl estradiol (MICROGESTIN 1/20) 1-20 MG-MCG tablet     sertraline (ZOLOFT) 100 MG tablet     SYNTHROID 100 MCG tablet     vitamin D3 (CHOLECALCIFEROL) 50 mcg (2000 units) tablet     No current facility-administered medications for this visit.     Psychologic issues/Triggers: better with weight loss  ?  Family Support: good   ?  Insight/Motivation: good, wanting to feel better   ?  OBJECTIVE:   /72   Pulse 80   Temp 98.4  F (36.9  C)   Wt 94.8 kg (209 lb)   SpO2 98%   BMI 37.02 kg/m       Obesity Stage 2  Down 5 pounds in the past 4 weeks.   ?  ASSESSMENT:   -Obesity Stage 2. Body mass index is 37.02.  -Initial weight was 214 pounds on 6/14/22. Weight today is 209. Down 5 pounds since starting.   -Hypothyroidism-TSH was recently normal on 5/6/22.   -Fasting glucose was 93 on 9/23/21.   -Vit D was 26 on 6/14/22. 4000 units Vit D3 was started. Will recheck 4 weeks.   -She does have PCOS. Weight loss may help with symptoms associated with PCOS.   -She does have depression; controlled with sertraline and Wellbutrin. She feels this will improve with weight loss. Currently does not like the way she looks and does not want to leave her home.  ?  PLAN:  Discussed in detail and reinforced continued dietary and activity modifications including diet and exercise logs. New goal set for 4# weight loss over the next 4 weeks. F/U in 4 weeks.

## 2022-07-25 ENCOUNTER — OFFICE VISIT (OUTPATIENT)
Dept: FAMILY MEDICINE | Facility: OTHER | Age: 42
End: 2022-07-25
Attending: NURSE PRACTITIONER
Payer: COMMERCIAL

## 2022-07-25 VITALS
WEIGHT: 209 LBS | SYSTOLIC BLOOD PRESSURE: 110 MMHG | DIASTOLIC BLOOD PRESSURE: 72 MMHG | TEMPERATURE: 98.4 F | OXYGEN SATURATION: 98 % | HEART RATE: 80 BPM | BODY MASS INDEX: 37.02 KG/M2

## 2022-07-25 DIAGNOSIS — F33.1 MAJOR DEPRESSIVE DISORDER, RECURRENT EPISODE, MODERATE (H): ICD-10-CM

## 2022-07-25 DIAGNOSIS — E66.812 OBESITY, CLASS II, BMI 35-39.9: ICD-10-CM

## 2022-07-25 DIAGNOSIS — E03.9 HYPOTHYROIDISM, UNSPECIFIED TYPE: Primary | ICD-10-CM

## 2022-07-25 DIAGNOSIS — E28.2 PCOS (POLYCYSTIC OVARIAN SYNDROME): ICD-10-CM

## 2022-07-25 DIAGNOSIS — Z71.3 DIETARY COUNSELING AND SURVEILLANCE: ICD-10-CM

## 2022-07-25 PROCEDURE — 99213 OFFICE O/P EST LOW 20 MIN: CPT | Performed by: NURSE PRACTITIONER

## 2022-07-25 NOTE — NURSING NOTE
"Chief Complaint   Patient presents with     Weight Check       Initial /72   Pulse 80   Temp 98.4  F (36.9  C)   Wt 94.8 kg (209 lb)   SpO2 98%   BMI 37.02 kg/m   Estimated body mass index is 37.02 kg/m  as calculated from the following:    Height as of 6/14/22: 1.6 m (5' 3\").    Weight as of this encounter: 94.8 kg (209 lb).  Medication Reconciliation: complete  Jessa Behrman, LPN    "

## 2022-08-25 DIAGNOSIS — J01.91 ACUTE RECURRENT SINUSITIS, UNSPECIFIED LOCATION: ICD-10-CM

## 2022-08-25 DIAGNOSIS — R05.9 COUGH: ICD-10-CM

## 2022-08-26 RX ORDER — MONTELUKAST SODIUM 10 MG/1
TABLET ORAL
Qty: 30 TABLET | Refills: 3 | Status: SHIPPED | OUTPATIENT
Start: 2022-08-26 | End: 2023-02-20

## 2022-08-26 NOTE — TELEPHONE ENCOUNTER
Montelukast Sodium 10 MG Oral Tablet (Singulair)       Last Written Prescription Date:  2/25/22  Last Fill Quantity: 30,   # refills: 3  Last Office Visit: 5/3/22  Future Office visit:    Next 5 appointments (look out 90 days)    Aug 29, 2022 11:20 AM  (Arrive by 11:05 AM)  SHORT with Kiersten Oden NP  [facility] ([facility] ) [address]  [place] [address]  [phone]   Sep 26, 2022  9:00 AM  (Arrive by 8:45 AM)  PHYSICAL with Ashley Allen MD  [facility] ([facility] ) [address]  [place] [address]  [phone]           Routing refill request to provider for review/approval because:    Specialty Provider

## 2022-09-04 ENCOUNTER — HEALTH MAINTENANCE LETTER (OUTPATIENT)
Age: 42
End: 2022-09-04

## 2022-09-26 ENCOUNTER — ANCILLARY PROCEDURE (OUTPATIENT)
Dept: GENERAL RADIOLOGY | Facility: OTHER | Age: 42
End: 2022-09-26
Attending: FAMILY MEDICINE
Payer: COMMERCIAL

## 2022-09-26 ENCOUNTER — OFFICE VISIT (OUTPATIENT)
Dept: FAMILY MEDICINE | Facility: OTHER | Age: 42
End: 2022-09-26
Attending: FAMILY MEDICINE
Payer: COMMERCIAL

## 2022-09-26 VITALS
SYSTOLIC BLOOD PRESSURE: 110 MMHG | OXYGEN SATURATION: 98 % | RESPIRATION RATE: 16 BRPM | HEART RATE: 89 BPM | TEMPERATURE: 97.6 F | WEIGHT: 215.5 LBS | BODY MASS INDEX: 38.18 KG/M2 | DIASTOLIC BLOOD PRESSURE: 66 MMHG | HEIGHT: 63 IN

## 2022-09-26 DIAGNOSIS — Z23 NEED FOR PROPHYLACTIC VACCINATION AND INOCULATION AGAINST INFLUENZA: ICD-10-CM

## 2022-09-26 DIAGNOSIS — G89.29 CHRONIC BILATERAL LOW BACK PAIN WITHOUT SCIATICA: ICD-10-CM

## 2022-09-26 DIAGNOSIS — M54.50 CHRONIC BILATERAL LOW BACK PAIN WITHOUT SCIATICA: ICD-10-CM

## 2022-09-26 DIAGNOSIS — Z00.00 ROUTINE GENERAL MEDICAL EXAMINATION AT A HEALTH CARE FACILITY: Primary | ICD-10-CM

## 2022-09-26 DIAGNOSIS — F33.1 MAJOR DEPRESSIVE DISORDER, RECURRENT EPISODE, MODERATE (H): ICD-10-CM

## 2022-09-26 DIAGNOSIS — E03.9 HYPOTHYROIDISM, UNSPECIFIED TYPE: ICD-10-CM

## 2022-09-26 LAB
CHOLEST SERPL-MCNC: 222 MG/DL
FASTING STATUS PATIENT QL REPORTED: YES
FASTING STATUS PATIENT QL REPORTED: YES
GLUCOSE BLD-MCNC: 90 MG/DL (ref 70–99)
HDLC SERPL-MCNC: 45 MG/DL
HOLD SPECIMEN: NORMAL
LDLC SERPL CALC-MCNC: 139 MG/DL
NONHDLC SERPL-MCNC: 177 MG/DL
TRIGL SERPL-MCNC: 188 MG/DL
TSH SERPL DL<=0.005 MIU/L-ACNC: 3 MU/L (ref 0.4–4)

## 2022-09-26 PROCEDURE — 36415 COLL VENOUS BLD VENIPUNCTURE: CPT | Performed by: FAMILY MEDICINE

## 2022-09-26 PROCEDURE — 84443 ASSAY THYROID STIM HORMONE: CPT | Performed by: FAMILY MEDICINE

## 2022-09-26 PROCEDURE — 99395 PREV VISIT EST AGE 18-39: CPT | Mod: 25 | Performed by: FAMILY MEDICINE

## 2022-09-26 PROCEDURE — 90471 IMMUNIZATION ADMIN: CPT | Performed by: FAMILY MEDICINE

## 2022-09-26 PROCEDURE — 80061 LIPID PANEL: CPT | Performed by: FAMILY MEDICINE

## 2022-09-26 PROCEDURE — 72100 X-RAY EXAM L-S SPINE 2/3 VWS: CPT | Mod: TC | Performed by: RADIOLOGY

## 2022-09-26 PROCEDURE — 82947 ASSAY GLUCOSE BLOOD QUANT: CPT | Performed by: FAMILY MEDICINE

## 2022-09-26 PROCEDURE — 90686 IIV4 VACC NO PRSV 0.5 ML IM: CPT | Performed by: FAMILY MEDICINE

## 2022-09-26 ASSESSMENT — ENCOUNTER SYMPTOMS
HEADACHES: 1
DIARRHEA: 0
JOINT SWELLING: 0
HEARTBURN: 0
HEMATURIA: 0
BREAST MASS: 0
DYSURIA: 0
SORE THROAT: 0
CONSTIPATION: 0
CHILLS: 0
DIZZINESS: 0
COUGH: 0
ABDOMINAL PAIN: 0
ARTHRALGIAS: 0
PARESTHESIAS: 0
NERVOUS/ANXIOUS: 1
FEVER: 0
FREQUENCY: 0
HEMATOCHEZIA: 0
WEAKNESS: 0
NAUSEA: 0
EYE PAIN: 0
MYALGIAS: 1
SHORTNESS OF BREATH: 0
PALPITATIONS: 0

## 2022-09-26 ASSESSMENT — ANXIETY QUESTIONNAIRES
IF YOU CHECKED OFF ANY PROBLEMS ON THIS QUESTIONNAIRE, HOW DIFFICULT HAVE THESE PROBLEMS MADE IT FOR YOU TO DO YOUR WORK, TAKE CARE OF THINGS AT HOME, OR GET ALONG WITH OTHER PEOPLE: NOT DIFFICULT AT ALL
3. WORRYING TOO MUCH ABOUT DIFFERENT THINGS: SEVERAL DAYS
2. NOT BEING ABLE TO STOP OR CONTROL WORRYING: NOT AT ALL
4. TROUBLE RELAXING: SEVERAL DAYS
1. FEELING NERVOUS, ANXIOUS, OR ON EDGE: SEVERAL DAYS
GAD7 TOTAL SCORE: 5
GAD7 TOTAL SCORE: 5
5. BEING SO RESTLESS THAT IT IS HARD TO SIT STILL: SEVERAL DAYS
7. FEELING AFRAID AS IF SOMETHING AWFUL MIGHT HAPPEN: NOT AT ALL
6. BECOMING EASILY ANNOYED OR IRRITABLE: SEVERAL DAYS

## 2022-09-26 ASSESSMENT — PAIN SCALES - GENERAL: PAINLEVEL: NO PAIN (0)

## 2022-09-26 ASSESSMENT — PATIENT HEALTH QUESTIONNAIRE - PHQ9: SUM OF ALL RESPONSES TO PHQ QUESTIONS 1-9: 9

## 2022-09-26 NOTE — PROGRESS NOTES
SUBJECTIVE:   CC: Etta is an 41 year old who presents for preventive health visit.     Patient has been advised of split billing requirements and indicates understanding: Yes     Healthy Habits:     Getting at least 3 servings of Calcium per day:  Yes    Bi-annual eye exam:  Yes    Dental care twice a year:  Yes    Sleep apnea or symptoms of sleep apnea:  Daytime drowsiness    Diet:  Regular (no restrictions)    Frequency of exercise:  1 day/week    Duration of exercise:  30-45 minutes    Taking medications regularly:  Yes    PHQ-2 Total Score: 2    Additional concerns today:  Yes      Depression and Anxiety Follow-Up    How are you doing with your depression since your last visit? No change    How are you doing with your anxiety since your last visit?  No change    Are you having other symptoms that might be associated with depression or anxiety? No    Have you had a significant life event? No     Do you have any concerns with your use of alcohol or other drugs? No    Social History     Tobacco Use     Smoking status: Never Smoker     Smokeless tobacco: Never Used   Substance Use Topics     Alcohol use: No     Drug use: No     PHQ 9/23/2021 5/6/2022 9/26/2022   PHQ-9 Total Score 9 12 9   Q9: Thoughts of better off dead/self-harm past 2 weeks Not at all Not at all Not at all     ARNALDO-7 SCORE 9/23/2021 5/6/2022 9/26/2022   Total Score 2 2 5       Suicide Assessment Five-step Evaluation and Treatment (SAFE-T)    Hypothyroidism Follow-up      Since last visit, patient describes the following symptoms: Weight stable, no hair loss, no skin changes, no constipation, no loose stools    Back Pain       Duration: years        Specific cause: none    Description:   Location of pain: low back bilateral  Character of pain: dull ache and intermittent  Pain radiation:none  New numbness or weakness in legs, not attributed to pain:  no     Intensity: mild, moderate    History:   Pain interferes with job: No  History of back  problems: no prior back problems  Any previous MRI or X-rays: None  Sees a specialist for back pain:  No  Therapies tried without relief: acetaminophen (Tylenol), chiropractor and NSAIDs    Alleviating factors:   Improved by: none      Precipitating factors:  Worsened by: Lifting and Bending    Accompanying Signs & Symptoms:  Risk of Fracture:  None  Risk of Cauda Equina:  None  Risk of Infection:  None  Risk of Cancer:  None  Risk of Ankylosing Spondylitis:  Onset at age <35, male, AND morning back stiffness. no        Today's PHQ-2 Score:   PHQ-2 ( 1999 Pfizer) 9/26/2022   Q1: Little interest or pleasure in doing things 1   Q2: Feeling down, depressed or hopeless 1   PHQ-2 Score 2   PHQ-2 Total Score (12-17 Years)- Positive if 3 or more points; Administer PHQ-A if positive -   Q1: Little interest or pleasure in doing things Several days   Q2: Feeling down, depressed or hopeless Several days   PHQ-2 Score 2       Abuse: Current or Past (Physical, Sexual or Emotional) - No  Do you feel safe in your environment? Yes    Have you ever done Advance Care Planning? (For example, a Health Directive, POLST, or a discussion with a medical provider or your loved ones about your wishes): No, advance care planning information given to patient to review.  Patient plans to discuss their wishes with loved ones or provider.      Social History     Tobacco Use     Smoking status: Never Smoker     Smokeless tobacco: Never Used   Substance Use Topics     Alcohol use: No     If you drink alcohol do you typically have >3 drinks per day or >7 drinks per week? No    Alcohol Use 9/26/2022   Prescreen: >3 drinks/day or >7 drinks/week? Not Applicable   Prescreen: >3 drinks/day or >7 drinks/week? -       Reviewed orders with patient.  Reviewed health maintenance and updated orders accordingly - Yes  Patient Active Problem List   Diagnosis     PCOS (polycystic ovarian syndrome)     Hirsutism     Elevated testosterone level in female      Ovarian hyperthecosis     History of pre-term labor     Hypothyroidism     Infertility, anovulation     PVC's (premature ventricular contractions)     H/O premature delivery     Bilateral carpal tunnel syndrome      (spontaneous vaginal delivery)     ACP (advance care planning)     Major depressive disorder, recurrent episode, moderate (H)     Gynecomastia, female     High risk HPV infection     Papanicolaou smear of cervix with low grade squamous intraepithelial lesion (LGSIL)     Encounter for other general counseling or advice on contraception     Hyperandrogenemia syndrome, female, post pubertal     Constipation     History of gestational diabetes     Past Surgical History:   Procedure Laterality Date     CARPAL TUNNEL RELEASE RT/LT Left 2022    [facility]     dental surgical procedure       LAPAROSCOPIC TUBAL DYE STUDY N/A 2014    Procedure: LAPAROSCOPIC TUBAL DYE STUDY;  Surgeon: Amanuel Purcell MD;  Location: [facility]     MAMMOPLASTY REDUCTION  10/19       Social History     Tobacco Use     Smoking status: Never Smoker     Smokeless tobacco: Never Used   Substance Use Topics     Alcohol use: No     Family History   Problem Relation Age of Onset     Lipids Father         hyperlipidemia     Hypertension Father      Lipids Mother         hyperlipidemia     Lupus Mother         erythematosus     Diabetes Other      Thyroid Disease Other      Asthma No family hx of          Current Outpatient Medications   Medication Sig Dispense Refill     albuterol (PROAIR HFA/PROVENTIL HFA/VENTOLIN HFA) 108 (90 Base) MCG/ACT inhaler Inhale 1-2 puffs into the lungs every 4 hours as needed for shortness of breath / dyspnea or wheezing 18 g 0     budesonide (PULMICORT) 0.5 MG/2ML neb solution Mix 240 ml [place] Med Sinus rinse, add 0.5 mg budesonide vial, rinse 1/2 bottle in each nostril twice daily 60 mL 4     buPROPion (WELLBUTRIN XL) 150 MG 24 hr tablet TAKE 1 TABLET BY MOUTH EVERY MORNING WITH 300  MG TABLET= 450 MG EVERY MORNING 90 tablet 3     buPROPion (WELLBUTRIN XL) 300 MG 24 hr tablet TAKE 1 TABLET BY MOUTH EVERY MORNING WITH 150 MG TABLET= 450 MG EVERY MORNING 90 tablet 3     montelukast (SINGULAIR) 10 MG tablet Take 1 Tablet by mouth at bedtime. 30 tablet 3     norethindrone-ethinyl estradiol (MICROGESTIN 1/20) 1-20 MG-MCG tablet TAKE 1 TABLET BY MOUTH EVERY DAY- SKIPPING PLACEBO 84 tablet 3     sertraline (ZOLOFT) 100 MG tablet Take 1.5 tablets (150 mg) by mouth daily 135 tablet 3     SYNTHROID 100 MCG tablet TAKE 1 TABLET BY MOUTH EVERY DAY 90 tablet 2     vitamin D3 (CHOLECALCIFEROL) 50 mcg (2000 units) tablet Take 2 tablets (100 mcg) by mouth daily       Allergies   Allergen Reactions     Macrolides Hives     Macrolide antibiotics     Penicillins Hives     Sulfonamide Derivatives Hives     Sulfa       Breast Cancer Screening:    FHS-7:   Breast CA Risk Assessment (FHS-7) 9/26/2022   Did any of your first-degree relatives have breast or ovarian cancer? No   Did any of your relatives have bilateral breast cancer? No   Did any man in your family have breast cancer? No   Did any woman in your family have breast and ovarian cancer? Yes   Did any woman in your family have breast cancer before age 50 y? Yes   Do you have 2 or more relatives with breast and/or ovarian cancer? No   Do you have 2 or more relatives with breast and/or bowel cancer? No       Mammogram Screening - Offered annual screening and updated Health Maintenance for mutual plan based on risk factor consideration    Pertinent mammograms are reviewed under the imaging tab.    History of abnormal Pap smear: NO - age 30-65 PAP every 5 years with negative HPV co-testing recommended  PAP / HPV Latest Ref Rng & Units 9/1/2020 5/13/2019 5/9/2018   PAP (Historical) - NIL LSIL(A) NIL   HPV16 NEG:Negative Negative Negative Negative   HPV18 NEG:Negative Negative Negative Negative   HRHPV NEG:Negative Negative Positive(A) Positive(A)     Reviewed and  "updated as needed this visit by clinical staff   Tobacco  Allergies  Meds  Problems  Med Hx  Surg Hx  Fam Hx  Soc   Hx          Reviewed and updated as needed this visit by Provider   Tobacco  Allergies  Meds  Problems  Med Hx  Surg Hx  Fam Hx               Review of Systems   Constitutional: Negative for chills and fever.   HENT: Positive for congestion. Negative for ear pain, hearing loss and sore throat.    Eyes: Negative for pain and visual disturbance.   Respiratory: Negative for cough and shortness of breath.    Cardiovascular: Negative for chest pain, palpitations and peripheral edema.   Gastrointestinal: Negative for abdominal pain, constipation, diarrhea, heartburn, hematochezia and nausea.   Breasts:  Negative for tenderness, breast mass and discharge.   Genitourinary: Negative for dysuria, frequency, genital sores, hematuria, pelvic pain, urgency, vaginal bleeding and vaginal discharge.   Musculoskeletal: Positive for myalgias. Negative for arthralgias and joint swelling.   Skin: Negative for rash.   Neurological: Positive for headaches. Negative for dizziness, weakness and paresthesias.   Psychiatric/Behavioral: Negative for mood changes. The patient is nervous/anxious.         OBJECTIVE:   /66 (BP Location: Right arm, Patient Position: Sitting, Cuff Size: Adult Large)   Pulse 89   Temp 97.6  F (36.4  C) (Tympanic)   Resp 16   Ht 1.6 m (5' 3\")   Wt 97.8 kg (215 lb 8 oz)   SpO2 98%   BMI 38.17 kg/m    Physical Exam  GENERAL: healthy, alert and no distress  EYES: Eyes grossly normal to inspection, PERRL and conjunctivae and sclerae normal  HENT: ear canals and TM's normal, nose and mouth without ulcers or lesions  NECK: no adenopathy  RESP: lungs clear to auscultation - no rales, rhonchi or wheezes  CV: regular rates and rhythm, normal S1 S2, no S3 or S4 and no murmur, click or rub  MS: no gross musculoskeletal defects noted, no edema  SKIN: no suspicious lesions or " "rashes  NEURO: Normal strength and tone, mentation intact and speech normal  PSYCH: mentation appears normal, affect normal/bright    Diagnostic Test Results:  See orders  Xray negative for acute changes    ASSESSMENT/PLAN:       ICD-10-CM    1. Routine general medical examination at a health care facility  Z00.00 Lipid Profile (Chol, Trig, HDL, LDL calc)     Glucose     Lipid Profile (Chol, Trig, HDL, LDL calc)     Glucose   2. Major depressive disorder, recurrent episode, moderate (H)  F33.1    3. Hypothyroidism, unspecified type  E03.9 TSH with free T4 reflex     TSH with free T4 reflex   4. Chronic bilateral low back pain without sciatica  M54.50 XR LUMBAR SPINE 2/3 VIEWS (Clinic Performed)    G89.29 Physical Therapy Referral   5. Need for prophylactic vaccination and inoculation against influenza  Z23 INFLUENZA VACCINE IM > 6 MONTHS VALENT IIV4 (AFLURIA/FLUZONE)         COUNSELING:  Reviewed preventive health counseling, as reflected in patient instructions    Estimated body mass index is 38.17 kg/m  as calculated from the following:    Height as of this encounter: 1.6 m (5' 3\").    Weight as of this encounter: 97.8 kg (215 lb 8 oz).    Weight management plan: Discussed healthy diet and exercise guidelines    She reports that she has never smoked. She has never used smokeless tobacco.      Counseling Resources:  ATP IV Guidelines  Pooled Cohorts Equation Calculator  Breast Cancer Risk Calculator  BRCA-Related Cancer Risk Assessment: FHS-7 Tool  FRAX Risk Assessment  ICSI Preventive Guidelines  Dietary Guidelines for Americans, 2010  USDA's MyPlate  ASA Prophylaxis  Lung CA Screening    Ashley Allen MD  [facility] [place] [facility]  "

## 2022-09-26 NOTE — NURSING NOTE
"Chief Complaint   Patient presents with     Physical       Initial /66 (BP Location: Right arm, Patient Position: Sitting, Cuff Size: Adult Large)   Pulse 89   Temp 97.6  F (36.4  C) (Tympanic)   Resp 16   Ht 1.6 m (5' 3\")   Wt 97.8 kg (215 lb 8 oz)   SpO2 98%   BMI 38.17 kg/m   Estimated body mass index is 38.17 kg/m  as calculated from the following:    Height as of this encounter: 1.6 m (5' 3\").    Weight as of this encounter: 97.8 kg (215 lb 8 oz).  Medication Reconciliation: complete  Lulu Angeles MA  "

## 2022-10-07 DIAGNOSIS — F33.1 MAJOR DEPRESSIVE DISORDER, RECURRENT EPISODE, MODERATE (H): ICD-10-CM

## 2022-10-07 RX ORDER — BUPROPION HYDROCHLORIDE 150 MG/1
TABLET ORAL
Qty: 90 TABLET | Refills: 3 | Status: SHIPPED | OUTPATIENT
Start: 2022-10-07 | End: 2023-10-23

## 2022-10-07 RX ORDER — SERTRALINE HYDROCHLORIDE 100 MG/1
TABLET, FILM COATED ORAL
Qty: 90 TABLET | Refills: 3 | Status: SHIPPED | OUTPATIENT
Start: 2022-10-07 | End: 2023-09-25

## 2022-10-07 NOTE — TELEPHONE ENCOUNTER
buPROPion      Last Written Prescription Date:  10/15/21  Last Fill Quantity: 90,   # refills: 3  Last Office Visit: 9/26/22  Future Office visit:       Routing refill request to provider for review/approval because:      sertraliine      Last Written Prescription Date:  5/6/22  Last Fill Quantity: 135,   # refills: 3  Last Office Visit: 9/26/22  Future Office visit:       Routing refill request to provider for review/approval because:

## 2022-11-28 DIAGNOSIS — Z30.09 FAMILY PLANNING: ICD-10-CM

## 2022-11-28 RX ORDER — NORETHINDRONE ACETATE AND ETHINYL ESTRADIOL .02; 1 MG/1; MG/1
TABLET ORAL
Qty: 84 TABLET | Refills: 3 | Status: SHIPPED | OUTPATIENT
Start: 2022-11-28 | End: 2023-11-28

## 2022-11-28 RX ORDER — BUPROPION HYDROCHLORIDE 300 MG/1
TABLET ORAL
Qty: 90 TABLET | Refills: 3 | Status: SHIPPED | OUTPATIENT
Start: 2022-11-28 | End: 2023-12-08

## 2022-11-30 RX ORDER — NORETHINDRONE ACETATE AND ETHINYL ESTRADIOL .02; 1 MG/1; MG/1
TABLET ORAL
Qty: 105 TABLET | OUTPATIENT
Start: 2022-11-30

## 2023-02-16 DIAGNOSIS — J01.91 ACUTE RECURRENT SINUSITIS, UNSPECIFIED LOCATION: ICD-10-CM

## 2023-02-16 DIAGNOSIS — R05.9 COUGH: ICD-10-CM

## 2023-02-20 RX ORDER — MONTELUKAST SODIUM 10 MG/1
TABLET ORAL
Qty: 30 TABLET | Refills: 1 | Status: SHIPPED | OUTPATIENT
Start: 2023-02-20 | End: 2023-04-17

## 2023-04-05 DIAGNOSIS — E03.9 HYPOTHYROIDISM, UNSPECIFIED TYPE: ICD-10-CM

## 2023-04-07 RX ORDER — LEVOTHYROXINE SODIUM 100 MCG
TABLET ORAL
Qty: 90 TABLET | Refills: 1 | Status: SHIPPED | OUTPATIENT
Start: 2023-04-07 | End: 2023-10-23

## 2023-04-17 ENCOUNTER — MYC MEDICAL ADVICE (OUTPATIENT)
Dept: FAMILY MEDICINE | Facility: OTHER | Age: 43
End: 2023-04-17

## 2023-04-17 DIAGNOSIS — J45.20 MILD INTERMITTENT REACTIVE AIRWAY DISEASE WITHOUT COMPLICATION: Primary | ICD-10-CM

## 2023-04-17 DIAGNOSIS — J01.91 ACUTE RECURRENT SINUSITIS, UNSPECIFIED LOCATION: ICD-10-CM

## 2023-04-17 DIAGNOSIS — R05.9 COUGH: ICD-10-CM

## 2023-04-17 RX ORDER — ALBUTEROL SULFATE 90 UG/1
2 AEROSOL, METERED RESPIRATORY (INHALATION) EVERY 4 HOURS PRN
Qty: 18 G | Refills: 1 | Status: SHIPPED | OUTPATIENT
Start: 2023-04-17

## 2023-04-17 RX ORDER — MONTELUKAST SODIUM 10 MG/1
TABLET ORAL
Qty: 90 TABLET | Refills: 1 | Status: SHIPPED | OUTPATIENT
Start: 2023-04-17 | End: 2023-11-29

## 2023-04-17 ASSESSMENT — ANXIETY QUESTIONNAIRES
7. FEELING AFRAID AS IF SOMETHING AWFUL MIGHT HAPPEN: NOT AT ALL
7. FEELING AFRAID AS IF SOMETHING AWFUL MIGHT HAPPEN: NOT AT ALL
1. FEELING NERVOUS, ANXIOUS, OR ON EDGE: NOT AT ALL
IF YOU CHECKED OFF ANY PROBLEMS ON THIS QUESTIONNAIRE, HOW DIFFICULT HAVE THESE PROBLEMS MADE IT FOR YOU TO DO YOUR WORK, TAKE CARE OF THINGS AT HOME, OR GET ALONG WITH OTHER PEOPLE: NOT DIFFICULT AT ALL
GAD7 TOTAL SCORE: 2
5. BEING SO RESTLESS THAT IT IS HARD TO SIT STILL: SEVERAL DAYS
GAD7 TOTAL SCORE: 2
4. TROUBLE RELAXING: NOT AT ALL
8. IF YOU CHECKED OFF ANY PROBLEMS, HOW DIFFICULT HAVE THESE MADE IT FOR YOU TO DO YOUR WORK, TAKE CARE OF THINGS AT HOME, OR GET ALONG WITH OTHER PEOPLE?: NOT DIFFICULT AT ALL
2. NOT BEING ABLE TO STOP OR CONTROL WORRYING: NOT AT ALL
6. BECOMING EASILY ANNOYED OR IRRITABLE: SEVERAL DAYS
GAD7 TOTAL SCORE: 2
3. WORRYING TOO MUCH ABOUT DIFFERENT THINGS: NOT AT ALL

## 2023-04-17 ASSESSMENT — PATIENT HEALTH QUESTIONNAIRE - PHQ9
10. IF YOU CHECKED OFF ANY PROBLEMS, HOW DIFFICULT HAVE THESE PROBLEMS MADE IT FOR YOU TO DO YOUR WORK, TAKE CARE OF THINGS AT HOME, OR GET ALONG WITH OTHER PEOPLE: SOMEWHAT DIFFICULT
SUM OF ALL RESPONSES TO PHQ QUESTIONS 1-9: 7
SUM OF ALL RESPONSES TO PHQ QUESTIONS 1-9: 7

## 2023-04-17 NOTE — TELEPHONE ENCOUNTER
[facility] for Depression Remission quality patient outreach per [facility] guidelines. Noted decrease in total scores of PHQ-9 & ARNALDO-7 when compared to last completed assessments done on 9/26/2022. Will outreach with patient to see if patient is interested in scheduling mental health jaz't with PCP [facility] to discuss mental health status further. This writer will provide [facility] contact info as well as mental health resources.             5/6/2022    10:00 AM 9/26/2022     8:00 AM 4/17/2023     8:37 AM   PHQ   PHQ-9 Total Score 12 9 7   Q9: Thoughts of better off dead/self-harm past 2 weeks Not at all Not at all Not at all          5/6/2022    10:00 AM 9/26/2022     8:00 AM 4/17/2023     8:38 AM   ARNALDO-7 SCORE   Total Score   2 (minimal anxiety)   Total Score 2 5 2     Luly Bowen RN

## 2023-04-17 NOTE — TELEPHONE ENCOUNTER
Singulair  Last Written Prescription Date: 2/20/23  Last Fill Quantity: 30 # of Refills: 1  Last Office Visit: 5/3/22

## 2023-05-09 ENCOUNTER — MYC MEDICAL ADVICE (OUTPATIENT)
Dept: FAMILY MEDICINE | Facility: OTHER | Age: 43
End: 2023-05-09

## 2023-05-09 ASSESSMENT — ASTHMA QUESTIONNAIRES
QUESTION_2 LAST FOUR WEEKS HOW OFTEN HAVE YOU HAD SHORTNESS OF BREATH: ONCE OR TWICE A WEEK
QUESTION_1 LAST FOUR WEEKS HOW MUCH OF THE TIME DID YOUR ASTHMA KEEP YOU FROM GETTING AS MUCH DONE AT WORK, SCHOOL OR AT HOME: NONE OF THE TIME
QUESTION_3 LAST FOUR WEEKS HOW OFTEN DID YOUR ASTHMA SYMPTOMS (WHEEZING, COUGHING, SHORTNESS OF BREATH, CHEST TIGHTNESS OR PAIN) WAKE YOU UP AT NIGHT OR EARLIER THAN USUAL IN THE MORNING: ONCE A WEEK
ACT_TOTALSCORE: 19
QUESTION_4 LAST FOUR WEEKS HOW OFTEN HAVE YOU USED YOUR RESCUE INHALER OR NEBULIZER MEDICATION (SUCH AS ALBUTEROL): TWO OR THREE TIMES PER WEEK
QUESTION_5 LAST FOUR WEEKS HOW WOULD YOU RATE YOUR ASTHMA CONTROL: WELL CONTROLLED
ACT_TOTALSCORE: 19

## 2023-05-10 NOTE — TELEPHONE ENCOUNTER
"Patient completed MyChart ACT per [facility] quality measure patient outreach. This writer notes that patient reports asthma is \"well controlled\" presently with total ACT score is 19. Will continue to monitor and provide MyChart outreach to determine if asthma control improves with total ACT score >/=20 in the future.         5/6/2022    10:00 AM 5/9/2023     5:44 PM   ACT Total Scores   ACT TOTAL SCORE (Goal Greater than or Equal to 20) 22 19   In the past 12 months, how many times did you visit the emergency room for your asthma without being admitted to the hospital? 0 0   In the past 12 months, how many times were you hospitalized overnight because of your asthma? 0 0      Luly Bowen RN    "

## 2023-05-12 ENCOUNTER — OFFICE VISIT (OUTPATIENT)
Dept: FAMILY MEDICINE | Facility: OTHER | Age: 43
End: 2023-05-12
Attending: FAMILY MEDICINE
Payer: COMMERCIAL

## 2023-05-12 VITALS
WEIGHT: 222 LBS | HEIGHT: 63 IN | BODY MASS INDEX: 39.34 KG/M2 | OXYGEN SATURATION: 98 % | HEART RATE: 92 BPM | TEMPERATURE: 98 F | RESPIRATION RATE: 16 BRPM | SYSTOLIC BLOOD PRESSURE: 104 MMHG | DIASTOLIC BLOOD PRESSURE: 66 MMHG

## 2023-05-12 DIAGNOSIS — Z84.0 FAMILY HISTORY OF LUPUS ERYTHEMATOSUS: ICD-10-CM

## 2023-05-12 DIAGNOSIS — R40.0 DAYTIME SLEEPINESS: ICD-10-CM

## 2023-05-12 DIAGNOSIS — R53.83 FATIGUE, UNSPECIFIED TYPE: ICD-10-CM

## 2023-05-12 DIAGNOSIS — J45.990 EXERCISE-INDUCED ASTHMA: Primary | ICD-10-CM

## 2023-05-12 LAB
ALBUMIN SERPL BCG-MCNC: 4.1 G/DL (ref 3.5–5.2)
ALP SERPL-CCNC: 100 U/L (ref 35–104)
ALT SERPL W P-5'-P-CCNC: 30 U/L (ref 10–35)
ANION GAP SERPL CALCULATED.3IONS-SCNC: 13 MMOL/L (ref 7–15)
AST SERPL W P-5'-P-CCNC: 21 U/L (ref 10–35)
BILIRUB SERPL-MCNC: 0.3 MG/DL
BUN SERPL-MCNC: 14.3 MG/DL (ref 6–20)
CALCIUM SERPL-MCNC: 9.2 MG/DL (ref 8.6–10)
CHLORIDE SERPL-SCNC: 106 MMOL/L (ref 98–107)
CREAT SERPL-MCNC: 0.86 MG/DL (ref 0.51–0.95)
DEPRECATED HCO3 PLAS-SCNC: 23 MMOL/L (ref 22–29)
ERYTHROCYTE [DISTWIDTH] IN BLOOD BY AUTOMATED COUNT: 12.6 % (ref 10–15)
FERRITIN SERPL-MCNC: 170 NG/ML (ref 6–175)
GFR SERPL CREATININE-BSD FRML MDRD: 87 ML/MIN/1.73M2
GLUCOSE SERPL-MCNC: 91 MG/DL (ref 70–99)
HCT VFR BLD AUTO: 42.7 % (ref 35–47)
HGB BLD-MCNC: 14.6 G/DL (ref 11.7–15.7)
MCH RBC QN AUTO: 31.2 PG (ref 26.5–33)
MCHC RBC AUTO-ENTMCNC: 34.2 G/DL (ref 31.5–36.5)
MCV RBC AUTO: 91 FL (ref 78–100)
PLATELET # BLD AUTO: 261 10E3/UL (ref 150–450)
POTASSIUM SERPL-SCNC: 4.4 MMOL/L (ref 3.4–5.3)
PROT SERPL-MCNC: 6.7 G/DL (ref 6.4–8.3)
RBC # BLD AUTO: 4.68 10E6/UL (ref 3.8–5.2)
SODIUM SERPL-SCNC: 142 MMOL/L (ref 136–145)
TSH SERPL DL<=0.005 MIU/L-ACNC: 1.13 UIU/ML (ref 0.3–4.2)
WBC # BLD AUTO: 9.2 10E3/UL (ref 4–11)

## 2023-05-12 PROCEDURE — 85027 COMPLETE CBC AUTOMATED: CPT | Performed by: FAMILY MEDICINE

## 2023-05-12 PROCEDURE — 82728 ASSAY OF FERRITIN: CPT | Performed by: FAMILY MEDICINE

## 2023-05-12 PROCEDURE — 99214 OFFICE O/P EST MOD 30 MIN: CPT | Performed by: FAMILY MEDICINE

## 2023-05-12 PROCEDURE — 84443 ASSAY THYROID STIM HORMONE: CPT | Performed by: FAMILY MEDICINE

## 2023-05-12 PROCEDURE — 86038 ANTINUCLEAR ANTIBODIES: CPT | Performed by: FAMILY MEDICINE

## 2023-05-12 PROCEDURE — 80053 COMPREHEN METABOLIC PANEL: CPT | Performed by: FAMILY MEDICINE

## 2023-05-12 PROCEDURE — 36415 COLL VENOUS BLD VENIPUNCTURE: CPT | Performed by: FAMILY MEDICINE

## 2023-05-12 ASSESSMENT — ASTHMA QUESTIONNAIRES
QUESTION_3 LAST FOUR WEEKS HOW OFTEN DID YOUR ASTHMA SYMPTOMS (WHEEZING, COUGHING, SHORTNESS OF BREATH, CHEST TIGHTNESS OR PAIN) WAKE YOU UP AT NIGHT OR EARLIER THAN USUAL IN THE MORNING: NOT AT ALL
ACT_TOTALSCORE: 21
QUESTION_4 LAST FOUR WEEKS HOW OFTEN HAVE YOU USED YOUR RESCUE INHALER OR NEBULIZER MEDICATION (SUCH AS ALBUTEROL): ONCE A WEEK OR LESS
ACT_TOTALSCORE: 21
QUESTION_5 LAST FOUR WEEKS HOW WOULD YOU RATE YOUR ASTHMA CONTROL: WELL CONTROLLED
QUESTION_2 LAST FOUR WEEKS HOW OFTEN HAVE YOU HAD SHORTNESS OF BREATH: ONCE OR TWICE A WEEK
QUESTION_1 LAST FOUR WEEKS HOW MUCH OF THE TIME DID YOUR ASTHMA KEEP YOU FROM GETTING AS MUCH DONE AT WORK, SCHOOL OR AT HOME: A LITTLE OF THE TIME

## 2023-05-12 ASSESSMENT — PAIN SCALES - GENERAL: PAINLEVEL: NO PAIN (0)

## 2023-05-12 NOTE — PROGRESS NOTES
"  Assessment & Plan     1. Exercise-induced asthma  No changes, overall no symptoms    2. Daytime sleepiness  Will check labs as listed, but will also order sleep evaluation.  A couple of the symptoms patient reported hinted towards possible narcolepsy, will be interesting to see what Sleep Medicine has to say.  Follow-up with results when available.  - CBC with platelets; Future  - Comprehensive metabolic panel; Future  - TSH with free T4 reflex; Future  - Ferritin; Future  - Adult Sleep Eval & Management  Referral; Future  - CBC with platelets  - Comprehensive metabolic panel  - TSH with free T4 reflex  - Ferritin    3. Fatigue, unspecified type  - CBC with platelets; Future  - Comprehensive metabolic panel; Future  - TSH with free T4 reflex; Future  - Ferritin; Future  - Adult Sleep Eval & Management  Referral; Future  - CBC with platelets  - Comprehensive metabolic panel  - TSH with free T4 reflex  - Ferritin    4. Family history of lupus erythematosus  Will check DOT as well, patient states her mom has told her these were the symptoms she was experiencing before she was diagnosed with lupus.  - Anti Nuclear Radha IgG by IFA with Reflex; Future  - Anti Nuclear Radha IgG by IFA with Reflex        BMI:   Estimated body mass index is 39.33 kg/m  as calculated from the following:    Height as of this encounter: 1.6 m (5' 3\").    Weight as of this encounter: 100.7 kg (222 lb).     Return if symptoms worsen or fail to improve.    Ashley Allen MD  [facility]      Shannan Quiles is a 42 year old, presenting for the following health issues:  Asthma, Referral, and Gyn Exam      HPI     Asthma Follow-Up    Was ACT completed today?  Yes        5/12/2023     9:13 AM   ACT Total Scores   ACT TOTAL SCORE (Goal Greater than or Equal to 20) 21   In the past 12 months, how many times did you visit the emergency room for your asthma without being admitted to the hospital? 0   In the " "past 12 months, how many times were you hospitalized overnight because of your asthma? 0          How many days per week do you miss taking your asthma controller medication?  0    Please describe any recent triggers for your asthma: exercise or sports    Have you had any Emergency Room Visits, Urgent Care Visits, or Hospital Admissions since your last office visit?  No        Concern - Sleep Study referral  Onset: several years  Description: snoring  Intensity: moderate  Progression of Symptoms:  worsening  Accompanying Signs & Symptoms: snoring  Previous history of similar problem: no  Precipitating factors:        Worsened by: none  Alleviating factors:        Improved by: none  Therapies tried and outcome:  none     Pap ONLY - patient would like to wait until fall, which I think is fine      Review of Systems   Constitutional, HEENT, cardiovascular, pulmonary, gi and gu systems are negative, except as otherwise noted.      Objective    /66 (BP Location: Right arm, Patient Position: Sitting, Cuff Size: Adult Large)   Pulse 92   Temp 98  F (36.7  C) (Tympanic)   Resp 16   Ht 1.6 m (5' 3\")   Wt 100.7 kg (222 lb)   SpO2 98%   BMI 39.33 kg/m    Body mass index is 39.33 kg/m .  Physical Exam   GENERAL: healthy, alert and no distress  PSYCH: mentation appears normal, affect normal/bright                "

## 2023-05-15 LAB — ANA SER QL IF: NEGATIVE

## 2023-09-07 ENCOUNTER — E-VISIT (OUTPATIENT)
Dept: FAMILY MEDICINE | Facility: OTHER | Age: 43
End: 2023-09-07
Attending: FAMILY MEDICINE
Payer: COMMERCIAL

## 2023-09-07 DIAGNOSIS — M25.511 CHRONIC RIGHT SHOULDER PAIN: Primary | ICD-10-CM

## 2023-09-07 DIAGNOSIS — G89.29 CHRONIC RIGHT SHOULDER PAIN: Primary | ICD-10-CM

## 2023-09-07 PROCEDURE — 99421 OL DIG E/M SVC 5-10 MIN: CPT | Performed by: FAMILY MEDICINE

## 2023-09-07 NOTE — PATIENT INSTRUCTIONS
Thank you for choosing us for your care. I think an in-clinic visit with Orthopedics would be best next steps based on your symptoms. I will order an Orthopedics referral, you will be contacted to schedule and appointment.  I usually refer to [facility], please let me know if you would like to be seen elsewhere.    Ashley Allen MD

## 2023-09-07 NOTE — TELEPHONE ENCOUNTER
Provider E-Visit time total (minutes): 3    ELECTRONIC VISIT DOCUMENTATION:    SUBJECTIVE:  Note above accurately captures the substance of my conversation with the patient.    ASSESSMENT/ PLAN:  1. Chronic right shoulder pain  Referral ordered, follow-up as directed.  - Orthopedic  Referral; Future    Ashley Allen MD

## 2023-09-22 DIAGNOSIS — F33.1 MAJOR DEPRESSIVE DISORDER, RECURRENT EPISODE, MODERATE (H): ICD-10-CM

## 2023-09-25 RX ORDER — SERTRALINE HYDROCHLORIDE 100 MG/1
TABLET, FILM COATED ORAL
Qty: 90 TABLET | Refills: 3 | Status: SHIPPED | OUTPATIENT
Start: 2023-09-25 | End: 2024-08-19

## 2023-09-25 NOTE — TELEPHONE ENCOUNTER
ZOLOFT      Last Written Prescription Date:  10-7-22  Last Fill Quantity: 90,   # refills: 3  Last Office Visit: 5-12-23  Future Office visit:       Routing refill request to provider for review/approval because:  Drug not on the [facility], [facility] or [facility] refill protocol or controlled substance

## 2023-10-19 DIAGNOSIS — E03.9 HYPOTHYROIDISM, UNSPECIFIED TYPE: ICD-10-CM

## 2023-10-20 NOTE — TELEPHONE ENCOUNTER
Synthroid      Last Written Prescription Date:  4/7/23  Last Fill Quantity: 90,   # refills: 1  Last Office Visit: 5/12/23  Future Office visit:       Routing refill request to provider for review/approval because:      Wellbutrin      Last Written Prescription Date:  11/28/22  Last Fill Quantity: 90,   # refills: 3  Last Office Visit: 5/12/23  Future Office visit:       Routing refill request to provider for review/approval because:

## 2023-10-23 RX ORDER — LEVOTHYROXINE SODIUM 100 MCG
TABLET ORAL
Qty: 90 TABLET | Refills: 1 | Status: SHIPPED | OUTPATIENT
Start: 2023-10-23 | End: 2024-06-10

## 2023-10-23 RX ORDER — BUPROPION HYDROCHLORIDE 150 MG/1
TABLET ORAL
Qty: 90 TABLET | Refills: 1 | Status: SHIPPED | OUTPATIENT
Start: 2023-10-23 | End: 2024-08-08

## 2023-10-29 ENCOUNTER — E-VISIT (OUTPATIENT)
Dept: FAMILY MEDICINE | Facility: OTHER | Age: 43
End: 2023-10-29
Attending: FAMILY MEDICINE
Payer: COMMERCIAL

## 2023-10-29 DIAGNOSIS — J20.9 ACUTE BRONCHITIS WITH SYMPTOMS > 10 DAYS: Primary | ICD-10-CM

## 2023-10-29 PROCEDURE — 99421 OL DIG E/M SVC 5-10 MIN: CPT | Performed by: FAMILY MEDICINE

## 2023-10-30 RX ORDER — CEFDINIR 300 MG/1
600 CAPSULE ORAL DAILY
Qty: 14 CAPSULE | Refills: 0 | Status: SHIPPED | OUTPATIENT
Start: 2023-10-30 | End: 2023-10-30

## 2023-10-30 RX ORDER — CEFDINIR 300 MG/1
300 CAPSULE ORAL 2 TIMES DAILY
Qty: 20 CAPSULE | Refills: 0 | Status: SHIPPED | OUTPATIENT
Start: 2023-10-30 | End: 2023-11-09

## 2023-10-30 RX ORDER — PREDNISONE 20 MG/1
20 TABLET ORAL DAILY
Qty: 5 TABLET | Refills: 0 | Status: SHIPPED | OUTPATIENT
Start: 2023-10-30 | End: 2023-11-04

## 2023-10-30 NOTE — PATIENT INSTRUCTIONS
Dear Etta Obrien    After reviewing your responses, I've been able to diagnose you with Acute bronchitis with symptoms > 10 days.      Based on your responses and diagnosis, I have prescribed   Orders Placed This Encounter   Medications    DISCONTD: cefdinir (OMNICEF) 300 MG capsule     Sig: Take 2 capsules (600 mg) by mouth daily for 7 days     Dispense:  14 capsule     Refill:  0    cefdinir (OMNICEF) 300 MG capsule     Sig: Take 1 capsule (300 mg) by mouth 2 times daily for 10 days     Dispense:  20 capsule     Refill:  0     Script corrected for BID dosing and 10 day duration, please disregard previous script for omnicef    predniSONE (DELTASONE) 20 MG tablet     Sig: Take 1 tablet (20 mg) by mouth daily for 5 days     Dispense:  5 tablet     Refill:  0      to treat your symptoms. I have sent this to your pharmacy (disregard 7 day antibiotic listed above, sent in error, script corrected).? I added the prednisone to help with the wheezing.    It is also important to stay well hydrated, get lots of rest and take over-the-counter decongestants,?tylenol?or ibuprofen if you?are able to?take those medications per your primary care provider to help relieve discomfort.?     It is important that you take?all of?your prescribed medication even if your symptoms are improving after a few doses.? Taking?all of?your medicine helps prevent the symptoms from returning.?     If your symptoms worsen, you develop severe headache, vomiting, high fever (>102), or are not improving in 7 days, please contact your primary care provider for an appointment or visit any of our convenient Walk-in Care or Urgent Care Centers to be seen which can be found on our website?here.?     Thanks again for choosing?us?as your health care partner,?   ?  Ashley Allen MD?

## 2023-10-30 NOTE — TELEPHONE ENCOUNTER
Provider E-Visit time total (minutes): 6    ELECTRONIC VISIT DOCUMENTATION:    SUBJECTIVE:  Note above accurately captures the substance of my conversation with the patient.    ASSESSMENT/ PLAN:  1. Acute bronchitis with symptoms > 10 days  Due to length of time with symptoms and wheezing, antibiotics and steroid sent.  Follow-up if no improvement noted.  - cefdinir (OMNICEF) 300 MG capsule; Take 1 capsule (300 mg) by mouth 2 times daily for 10 days  Dispense: 20 capsule; Refill: 0  - predniSONE (DELTASONE) 20 MG tablet; Take 1 tablet (20 mg) by mouth daily for 5 days  Dispense: 5 tablet; Refill: 0    Ashley Allen MD

## 2023-11-20 ENCOUNTER — MYC MEDICAL ADVICE (OUTPATIENT)
Dept: FAMILY MEDICINE | Facility: OTHER | Age: 43
End: 2023-11-20

## 2023-11-21 ENCOUNTER — E-VISIT (OUTPATIENT)
Dept: FAMILY MEDICINE | Facility: OTHER | Age: 43
End: 2023-11-21
Attending: FAMILY MEDICINE
Payer: COMMERCIAL

## 2023-11-21 ENCOUNTER — MYC MEDICAL ADVICE (OUTPATIENT)
Dept: FAMILY MEDICINE | Facility: OTHER | Age: 43
End: 2023-11-21

## 2023-11-21 DIAGNOSIS — R05.1 ACUTE COUGH: ICD-10-CM

## 2023-11-21 DIAGNOSIS — M79.10 MYALGIA: ICD-10-CM

## 2023-11-21 LAB
FLUAV RNA SPEC QL NAA+PROBE: NEGATIVE
FLUBV RNA RESP QL NAA+PROBE: NEGATIVE
RSV RNA SPEC NAA+PROBE: NEGATIVE
SARS-COV-2 RNA RESP QL NAA+PROBE: NEGATIVE

## 2023-11-21 PROCEDURE — 87637 SARSCOV2&INF A&B&RSV AMP PRB: CPT

## 2023-11-21 PROCEDURE — 99421 OL DIG E/M SVC 5-10 MIN: CPT | Performed by: FAMILY MEDICINE

## 2023-11-21 NOTE — TELEPHONE ENCOUNTER
Provider E-Visit time total (minutes): 4    ELECTRONIC VISIT DOCUMENTATION:    SUBJECTIVE:  Note above accurately captures the substance of my conversation with the patient.    ASSESSMENT/ PLAN:  1. Acute cough  With sudden return of symptoms, will start with multiplex.  Follow-up with results when available.  - Symptomatic Influenza A/B, RSV, & SARS-CoV2 PCR (COVID-19); Future    2. Myalgia  - Symptomatic Influenza A/B, RSV, & SARS-CoV2 PCR (COVID-19); Future    Ashley Allen MD

## 2023-11-21 NOTE — PATIENT INSTRUCTIONS
Etta,    Thank you for choosing us for your care. With sudden return of significant symptoms, we should check for other causes of illness.  I have placed an order for a lab test called a multiplex, which tests for COVID, influenza, and RSV.  Our  will contact you to set up a lab only appointment.    You will receive your lab results and next steps via [employer] when the lab results return.    Sincerely,  Ashley Allen MD

## 2023-11-24 ENCOUNTER — OFFICE VISIT (OUTPATIENT)
Dept: FAMILY MEDICINE | Facility: OTHER | Age: 43
End: 2023-11-24
Attending: NURSE PRACTITIONER
Payer: COMMERCIAL

## 2023-11-24 VITALS
TEMPERATURE: 98.9 F | OXYGEN SATURATION: 97 % | DIASTOLIC BLOOD PRESSURE: 62 MMHG | HEIGHT: 63 IN | RESPIRATION RATE: 18 BRPM | SYSTOLIC BLOOD PRESSURE: 124 MMHG | HEART RATE: 109 BPM | BODY MASS INDEX: 39.11 KG/M2 | WEIGHT: 220.7 LBS

## 2023-11-24 DIAGNOSIS — J01.90 ACUTE SINUSITIS WITH SYMPTOMS > 10 DAYS: Primary | ICD-10-CM

## 2023-11-24 DIAGNOSIS — H10.32 ACUTE BACTERIAL CONJUNCTIVITIS OF LEFT EYE: ICD-10-CM

## 2023-11-24 PROCEDURE — 99213 OFFICE O/P EST LOW 20 MIN: CPT | Performed by: NURSE PRACTITIONER

## 2023-11-24 RX ORDER — DOXYCYCLINE 100 MG/1
100 CAPSULE ORAL 2 TIMES DAILY
Qty: 20 CAPSULE | Refills: 0 | Status: SHIPPED | OUTPATIENT
Start: 2023-11-24 | End: 2023-12-04

## 2023-11-24 RX ORDER — OFLOXACIN 3 MG/ML
1-2 SOLUTION/ DROPS OPHTHALMIC 4 TIMES DAILY
Qty: 10 ML | Refills: 0 | Status: SHIPPED | OUTPATIENT
Start: 2023-11-24 | End: 2024-02-09

## 2023-11-24 ASSESSMENT — PAIN SCALES - GENERAL: PAINLEVEL: SEVERE PAIN (6)

## 2023-11-24 NOTE — PROGRESS NOTES
"  Assessment & Plan     Acute sinusitis with symptoms > 10 days  Encouraged to start nasal washes with a commercial bottle kit.  - doxycycline hyclate (VIBRAMYCIN) 100 MG capsule; Take 1 capsule (100 mg) by mouth 2 times daily for 10 days    Acute bacterial conjunctivitis of left eye  - ofloxacin (OCUFLOX) 0.3 % ophthalmic solution; Place 1-2 drops Into the left eye 4 times daily Instill 2 drops in affected eye(s) every 2 to 4 hours for the first 2 days, then instill 2 drops 4 times daily for an additional 5 days.    Prescription drug management     BMI:   Estimated body mass index is 39.1 kg/m  as calculated from the following:    Height as of this encounter: 1.6 m (5' 3\").    Weight as of this encounter: 100.1 kg (220 lb 11.2 oz).       No follow-ups on file.    Huong Ireland, [facility] - [place] KAMLESH Quiles is a 43 year old, presenting for the following health issues:  Cough (Congestion)      HPI     Acute Illness  Acute illness concerns: Cough, congestion, sinuses  Onset/Duration: Initially had a cough and congestion for 3 weeks in October and put on cefdinir 10/30/23 for bronchitis. Had about a 1.5 weeks of feeling better. Unsure if 100%. Cough was not completes gone. Has an inhaler for asthma and was needing it less. However, on 11/20/2023   Symptoms:  Fever: No  Chills/Sweats: No  Headache (location?): YES- Whole head, pounds when coughin  Sinus Pressure: YES  Conjunctivitis:  YES  Ear Pain: no  Rhinorrhea: YES  Congestion: YES  Sore Throat: YES  Cough: YES-productive of green sputum, worsening over time  Wheeze: YES  Decreased Appetite: YES  Nausea: No  Vomiting: No  Diarrhea: No  Dysuria/Freq.: No  Dysuria or Hematuria: No  Fatigue/Achiness: YES  Sick/Strep Exposure: No  Therapies tried and outcome: OTC, sinus, cough, Ibuprofen, nothing really has made a difference. Was tested with multiplex this Tuesday.         Review of Systems   Constitutional, HEENT, cardiovascular, " "pulmonary, gi and gu systems are negative, except as otherwise noted.      Objective    /62   Pulse 109   Temp 98.9  F (37.2  C) (Tympanic)   Resp 18   Ht 1.6 m (5' 3\")   Wt 100.1 kg (220 lb 11.2 oz)   SpO2 97%   BMI 39.10 kg/m    Body mass index is 39.1 kg/m .      Physical Exam   GENERAL: healthy, alert and no distress  EYES: left eye- crusting drainage with slight injection. No photophobia. Otherwise normal functional exam of eye.   HENT: normal cephalic/atraumatic, ear canals and TM's normal, nose and mouth without ulcers or lesions, nasal mucosa edematous , oropharynx clear, oral mucous membranes moist, and sinuses: maxillary tenderness bilaterally.   NECK: no adenopathy, no asymmetry, masses, or scars and thyroid normal to palpation  RESP: lungs clear to auscultation - no rales, rhonchi or wheezes  CV: regular rate and rhythm, normal S1 S2, no S3 or S4, no murmur, click or rub, no peripheral edema and peripheral pulses strong  NEURO: Normal strength and tone, mentation intact and speech normal                  "

## 2023-11-25 DIAGNOSIS — Z30.09 FAMILY PLANNING: ICD-10-CM

## 2023-11-28 DIAGNOSIS — J01.91 ACUTE RECURRENT SINUSITIS, UNSPECIFIED LOCATION: ICD-10-CM

## 2023-11-28 DIAGNOSIS — R05.9 COUGH: ICD-10-CM

## 2023-11-28 RX ORDER — NORETHINDRONE ACETATE AND ETHINYL ESTRADIOL .02; 1 MG/1; MG/1
TABLET ORAL
Qty: 84 TABLET | Refills: 3 | Status: SHIPPED | OUTPATIENT
Start: 2023-11-28 | End: 2024-08-19

## 2023-11-28 NOTE — TELEPHONE ENCOUNTER
Singulair  Last Written Prescription Date: 4/17/23  Last Fill Quantity: 90 # of Refills: 1  Last Office Visit: 5/3/22

## 2023-11-29 RX ORDER — MONTELUKAST SODIUM 10 MG/1
TABLET ORAL
Qty: 90 TABLET | Refills: 1 | Status: SHIPPED | OUTPATIENT
Start: 2023-11-29 | End: 2024-08-19

## 2023-12-07 NOTE — PROGRESS NOTES
"  Assessment & Plan     1. Subacute cough  Will use prednisone burst to help with lingering symptoms.  Follow-up as needed.  - predniSONE (DELTASONE) 20 MG tablet; Take 1 tablet (20 mg) by mouth daily for 5 days  Dispense: 5 tablet; Refill: 0    2. Vitamin D deficiency  Updated  - Vitamin D Deficiency; Future  - Vitamin D Deficiency    3. Need for prophylactic vaccination and inoculation against influenza  Updated  - INFLUENZA VACCINE IM > 6 MONTHS VALENT IIV4 (AFLURIA/FLUZONE)        BMI:   Estimated body mass index is 38.69 kg/m  as calculated from the following:    Height as of this encounter: 1.6 m (5' 3\").    Weight as of this encounter: 99.1 kg (218 lb 6.4 oz).     Return if symptoms worsen or fail to improve.    Ashley Allen MD  [facility] - [place] [facility]      Subjective   Etta is a 43 year old, presenting for the following health issues:  RECHECK      HPI     Medication Followup of ofloxacin/doxycycline  Taking Medication as prescribed: yes  Side Effects:  None  Medication Helping Symptoms:  yes  Patient states she still has a bit of a cough and very intermittent sinus pain.  She is overall feeling better.    She is due for Vitamin D recheck.  She would also like a flu vaccine today.      Review of Systems   Constitutional, HEENT, cardiovascular, pulmonary, gi and gu systems are negative, except as otherwise noted.      Objective    /68 (BP Location: Left arm, Patient Position: Sitting, Cuff Size: Adult Regular)   Pulse 88   Temp 98.1  F (36.7  C) (Tympanic)   Resp 18   Ht 1.6 m (5' 3\")   Wt 99.1 kg (218 lb 6.4 oz)   SpO2 98%   BMI 38.69 kg/m    Body mass index is 38.69 kg/m .  Physical Exam   GENERAL: healthy, alert and no distress  RESP: lungs clear to auscultation - no rales, rhonchi or wheezes  CV: regular rates and rhythm, normal S1 S2, no S3 or S4, and no murmur, click or rub  PSYCH: mentation appears normal, affect normal/bright                  Answers submitted by the " patient for this visit:  Patient Health Questionnaire (Submitted on 12/8/2023)  If you checked off any problems, how difficult have these problems made it for you to do your work, take care of things at home, or get along with other people?: Somewhat difficult  PHQ9 TOTAL SCORE: 5  ARNALDO-7 (Submitted on 12/8/2023)  ARNALDO 7 TOTAL SCORE: 3

## 2023-12-08 ENCOUNTER — OFFICE VISIT (OUTPATIENT)
Dept: FAMILY MEDICINE | Facility: OTHER | Age: 43
End: 2023-12-08
Attending: FAMILY MEDICINE
Payer: COMMERCIAL

## 2023-12-08 VITALS
TEMPERATURE: 98.1 F | SYSTOLIC BLOOD PRESSURE: 114 MMHG | OXYGEN SATURATION: 98 % | DIASTOLIC BLOOD PRESSURE: 68 MMHG | HEIGHT: 63 IN | HEART RATE: 88 BPM | BODY MASS INDEX: 38.7 KG/M2 | WEIGHT: 218.4 LBS | RESPIRATION RATE: 18 BRPM

## 2023-12-08 DIAGNOSIS — R05.2 SUBACUTE COUGH: Primary | ICD-10-CM

## 2023-12-08 DIAGNOSIS — Z23 NEED FOR PROPHYLACTIC VACCINATION AND INOCULATION AGAINST INFLUENZA: ICD-10-CM

## 2023-12-08 DIAGNOSIS — E55.9 VITAMIN D DEFICIENCY: ICD-10-CM

## 2023-12-08 LAB
HOLD SPECIMEN: NORMAL
VIT D+METAB SERPL-MCNC: 25 NG/ML (ref 20–50)

## 2023-12-08 PROCEDURE — 82306 VITAMIN D 25 HYDROXY: CPT | Performed by: FAMILY MEDICINE

## 2023-12-08 PROCEDURE — 36415 COLL VENOUS BLD VENIPUNCTURE: CPT | Performed by: FAMILY MEDICINE

## 2023-12-08 PROCEDURE — 99213 OFFICE O/P EST LOW 20 MIN: CPT | Mod: 25 | Performed by: FAMILY MEDICINE

## 2023-12-08 PROCEDURE — 90471 IMMUNIZATION ADMIN: CPT | Performed by: FAMILY MEDICINE

## 2023-12-08 PROCEDURE — 90686 IIV4 VACC NO PRSV 0.5 ML IM: CPT | Performed by: FAMILY MEDICINE

## 2023-12-08 RX ORDER — BUPROPION HYDROCHLORIDE 300 MG/1
TABLET ORAL
Qty: 90 TABLET | Refills: 3 | Status: SHIPPED | OUTPATIENT
Start: 2023-12-08 | End: 2024-08-19

## 2023-12-08 RX ORDER — PREDNISONE 20 MG/1
20 TABLET ORAL DAILY
Qty: 5 TABLET | Refills: 0 | Status: SHIPPED | OUTPATIENT
Start: 2023-12-08 | End: 2023-12-13

## 2023-12-08 ASSESSMENT — ASTHMA QUESTIONNAIRES
QUESTION_5 LAST FOUR WEEKS HOW WOULD YOU RATE YOUR ASTHMA CONTROL: SOMEWHAT CONTROLLED
ACT_TOTALSCORE: 14
QUESTION_1 LAST FOUR WEEKS HOW MUCH OF THE TIME DID YOUR ASTHMA KEEP YOU FROM GETTING AS MUCH DONE AT WORK, SCHOOL OR AT HOME: SOME OF THE TIME
QUESTION_4 LAST FOUR WEEKS HOW OFTEN HAVE YOU USED YOUR RESCUE INHALER OR NEBULIZER MEDICATION (SUCH AS ALBUTEROL): TWO OR THREE TIMES PER WEEK
QUESTION_2 LAST FOUR WEEKS HOW OFTEN HAVE YOU HAD SHORTNESS OF BREATH: THREE TO SIX TIMES A WEEK
QUESTION_3 LAST FOUR WEEKS HOW OFTEN DID YOUR ASTHMA SYMPTOMS (WHEEZING, COUGHING, SHORTNESS OF BREATH, CHEST TIGHTNESS OR PAIN) WAKE YOU UP AT NIGHT OR EARLIER THAN USUAL IN THE MORNING: TWO OR THREE NIGHTS A WEEK
ACT_TOTALSCORE: 14

## 2023-12-08 ASSESSMENT — ANXIETY QUESTIONNAIRES
4. TROUBLE RELAXING: SEVERAL DAYS
2. NOT BEING ABLE TO STOP OR CONTROL WORRYING: NOT AT ALL
GAD7 TOTAL SCORE: 3
6. BECOMING EASILY ANNOYED OR IRRITABLE: SEVERAL DAYS
IF YOU CHECKED OFF ANY PROBLEMS ON THIS QUESTIONNAIRE, HOW DIFFICULT HAVE THESE PROBLEMS MADE IT FOR YOU TO DO YOUR WORK, TAKE CARE OF THINGS AT HOME, OR GET ALONG WITH OTHER PEOPLE: NOT DIFFICULT AT ALL
7. FEELING AFRAID AS IF SOMETHING AWFUL MIGHT HAPPEN: NOT AT ALL
GAD7 TOTAL SCORE: 3
1. FEELING NERVOUS, ANXIOUS, OR ON EDGE: NOT AT ALL
5. BEING SO RESTLESS THAT IT IS HARD TO SIT STILL: SEVERAL DAYS
3. WORRYING TOO MUCH ABOUT DIFFERENT THINGS: NOT AT ALL

## 2023-12-08 ASSESSMENT — PATIENT HEALTH QUESTIONNAIRE - PHQ9
SUM OF ALL RESPONSES TO PHQ QUESTIONS 1-9: 5
10. IF YOU CHECKED OFF ANY PROBLEMS, HOW DIFFICULT HAVE THESE PROBLEMS MADE IT FOR YOU TO DO YOUR WORK, TAKE CARE OF THINGS AT HOME, OR GET ALONG WITH OTHER PEOPLE: SOMEWHAT DIFFICULT
SUM OF ALL RESPONSES TO PHQ QUESTIONS 1-9: 5

## 2023-12-08 ASSESSMENT — PAIN SCALES - GENERAL: PAINLEVEL: NO PAIN (0)

## 2023-12-08 NOTE — TELEPHONE ENCOUNTER
Wellbutrin      Last Written Prescription Date:  11/28/22  Last Fill Quantity: 90,   # refills: 3  Last Office Visit: 12/08/23  Future Office visit:

## 2023-12-10 ENCOUNTER — HEALTH MAINTENANCE LETTER (OUTPATIENT)
Age: 43
End: 2023-12-10

## 2024-02-02 ENCOUNTER — MYC MEDICAL ADVICE (OUTPATIENT)
Dept: FAMILY MEDICINE | Facility: OTHER | Age: 44
End: 2024-02-02

## 2024-02-02 NOTE — TELEPHONE ENCOUNTER
Pt called and scheduled    Future Appointments 2/2/2024 - 7/31/2024        Date Visit Type Length Department Provider     2/8/2024  8:30 AM SHORT 30 min [place] [facility]  DemetruisAshley MD    Location Instructions:     From [place] - follow [address]nbsp; Take a right at the intersection across from [employer].&nbsp; The clinic will be on your right.  From [place] - follow [address]nbsp; Take a right onto [address].&nbsp; Take a left at the intersection across from [employer].&nbsp; The clinic will be on your right.  From [place] - follow [address]nbsp; Take a left onto [address].&nbsp; Take a left at the intersection across from [employer].&nbsp; The clinic will be on your right.

## 2024-02-06 NOTE — PROGRESS NOTES
"  Assessment & Plan     1. Dyshidrotic eczema  Triamcinolone cream recommended, also Aquaphor or Vaseline.  Follow-up if no improvement noted.  - triamcinolone (KENALOG) 0.1 % external cream; Apply topically 2 times daily as needed for irritation  Dispense: 45 g; Refill: 1    2. Dry scalp  We discussed that dandruff shampoo can make issues worse, she should switch to shampoo for dry scalp.  Derma smooth scalp oil also prescribed.  Follow-up as needed.  - Fluocinolone Acetonide Scalp (DERMA-SMOOTHE/FS SCALP) 0.01 % OIL oil; Apply topically once a week  Dispense: 118 mL; Refill: 2    3. Pain in the coccyx  Bruise, symptomatic cares recommended.  - XR Sacrum and Coccyx 2 Views; Future    4. Fall due to slipping on ice or snow, initial encounter  - XR Sacrum and Coccyx 2 Views; Future    5. Visit for screening mammogram  Ordered  - MA Screening Bilateral w/ Neal; Future         BMI  Estimated body mass index is 38.53 kg/m  as calculated from the following:    Height as of this encounter: 1.6 m (5' 3\").    Weight as of this encounter: 98.7 kg (217 lb 8 oz).   Weight management plan: Discussed healthy diet and exercise guidelines      Return if symptoms worsen or fail to improve.      Shannan Quiles is a 43 year old, presenting for the following health issues:  Derm Problem    HPI     Concern - Derm Problem  Onset: 1 month  Description: hands blistering and peeling, dryness on scalp  Intensity: moderate  Progression of Symptoms:  worsening  Accompanying Signs & Symptoms: blisters and then peels, head is red and flaking, painful  Previous history of similar problem: none  Precipitating factors:        Worsened by: nothing  Alleviating factors:        Improved by: nothing  Therapies tried and outcome: dandruff shampoo- no relief    Concern - Fall  Onset: 2 weeks  Description: Fell on ice  Intensity: moderate  Progression of Symptoms:  same  Accompanying Signs & Symptoms: tailbone hurts from fall on ice  Previous history " "of similar problem: none  Precipitating factors:        Worsened by: none  Alleviating factors:        Improved by: none  Therapies tried and outcome: ice- still having pain        Review of Systems  Constitutional, HEENT, cardiovascular, pulmonary, gi and gu systems are negative, except as otherwise noted.      Objective    /66 (BP Location: Left arm, Patient Position: Sitting, Cuff Size: Adult Regular)   Pulse 90   Temp 98.3  F (36.8  C) (Tympanic)   Resp 16   Ht 1.6 m (5' 3\")   Wt 98.7 kg (217 lb 8 oz)   SpO2 98%   BMI 38.53 kg/m    Body mass index is 38.53 kg/m .  Physical Exam   GENERAL: alert and no distress  SKIN: peeling skin of hands, consistent with eczema; dry scalp along hairline noted, not dandruff  PSYCH: mentation appears normal, affect normal/bright    Xray - Reviewed and interpreted by me.  No acute fracture of coccyx or sacrum        Signed Electronically by: Ashley Allen MD    Answers submitted by the patient for this visit:  Patient Health Questionnaire (Submitted on 2/8/2024)  If you checked off any problems, how difficult have these problems made it for you to do your work, take care of things at home, or get along with other people?: Somewhat difficult  PHQ9 TOTAL SCORE: 6  ARNALDO-7 (Submitted on 2/8/2024)  ARNALDO 7 TOTAL SCORE: 3    "

## 2024-02-08 ENCOUNTER — ANCILLARY PROCEDURE (OUTPATIENT)
Dept: GENERAL RADIOLOGY | Facility: OTHER | Age: 44
End: 2024-02-08
Attending: FAMILY MEDICINE
Payer: COMMERCIAL

## 2024-02-08 ENCOUNTER — OFFICE VISIT (OUTPATIENT)
Dept: FAMILY MEDICINE | Facility: OTHER | Age: 44
End: 2024-02-08
Attending: FAMILY MEDICINE
Payer: COMMERCIAL

## 2024-02-08 VITALS
DIASTOLIC BLOOD PRESSURE: 66 MMHG | BODY MASS INDEX: 38.54 KG/M2 | WEIGHT: 217.5 LBS | OXYGEN SATURATION: 98 % | SYSTOLIC BLOOD PRESSURE: 116 MMHG | HEART RATE: 90 BPM | HEIGHT: 63 IN | TEMPERATURE: 98.3 F | RESPIRATION RATE: 16 BRPM
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
## 2024-02-08 DIAGNOSIS — R23.8 DRY SCALP: ICD-10-CM

## 2024-02-08 DIAGNOSIS — Z12.31 VISIT FOR SCREENING MAMMOGRAM: ICD-10-CM

## 2024-02-08 DIAGNOSIS — M53.3 PAIN IN THE COCCYX: ICD-10-CM

## 2024-02-08 DIAGNOSIS — L30.1 DYSHIDROTIC ECZEMA: Primary | ICD-10-CM

## 2024-02-08 PROCEDURE — 72220 X-RAY EXAM SACRUM TAILBONE: CPT | Mod: TC | Performed by: RADIOLOGY

## 2024-02-08 PROCEDURE — 99213 OFFICE O/P EST LOW 20 MIN: CPT | Performed by: FAMILY MEDICINE

## 2024-02-08 RX ORDER — TRIAMCINOLONE ACETONIDE 1 MG/G
CREAM TOPICAL 2 TIMES DAILY PRN
Qty: 45 G | Refills: 1 | Status: SHIPPED | OUTPATIENT
Start: 2024-02-08

## 2024-02-08 RX ORDER — FLUOCINOLONE ACETONIDE 0.11 MG/ML
OIL TOPICAL WEEKLY
Qty: 118 ML | Refills: 2 | Status: SHIPPED | OUTPATIENT
Start: 2024-02-08 | End: 2024-08-19

## 2024-02-08 ASSESSMENT — ASTHMA QUESTIONNAIRES
QUESTION_5 LAST FOUR WEEKS HOW WOULD YOU RATE YOUR ASTHMA CONTROL: WELL CONTROLLED
ACT_TOTALSCORE: 21
QUESTION_2 LAST FOUR WEEKS HOW OFTEN HAVE YOU HAD SHORTNESS OF BREATH: NOT AT ALL
QUESTION_4 LAST FOUR WEEKS HOW OFTEN HAVE YOU USED YOUR RESCUE INHALER OR NEBULIZER MEDICATION (SUCH AS ALBUTEROL): ONCE A WEEK OR LESS
ACT_TOTALSCORE: 21
QUESTION_3 LAST FOUR WEEKS HOW OFTEN DID YOUR ASTHMA SYMPTOMS (WHEEZING, COUGHING, SHORTNESS OF BREATH, CHEST TIGHTNESS OR PAIN) WAKE YOU UP AT NIGHT OR EARLIER THAN USUAL IN THE MORNING: ONCE A WEEK
QUESTION_1 LAST FOUR WEEKS HOW MUCH OF THE TIME DID YOUR ASTHMA KEEP YOU FROM GETTING AS MUCH DONE AT WORK, SCHOOL OR AT HOME: NONE OF THE TIME

## 2024-02-08 ASSESSMENT — ANXIETY QUESTIONNAIRES
7. FEELING AFRAID AS IF SOMETHING AWFUL MIGHT HAPPEN: NOT AT ALL
IF YOU CHECKED OFF ANY PROBLEMS ON THIS QUESTIONNAIRE, HOW DIFFICULT HAVE THESE PROBLEMS MADE IT FOR YOU TO DO YOUR WORK, TAKE CARE OF THINGS AT HOME, OR GET ALONG WITH OTHER PEOPLE: SOMEWHAT DIFFICULT
5. BEING SO RESTLESS THAT IT IS HARD TO SIT STILL: SEVERAL DAYS
8. IF YOU CHECKED OFF ANY PROBLEMS, HOW DIFFICULT HAVE THESE MADE IT FOR YOU TO DO YOUR WORK, TAKE CARE OF THINGS AT HOME, OR GET ALONG WITH OTHER PEOPLE?: SOMEWHAT DIFFICULT
7. FEELING AFRAID AS IF SOMETHING AWFUL MIGHT HAPPEN: NOT AT ALL
4. TROUBLE RELAXING: SEVERAL DAYS
6. BECOMING EASILY ANNOYED OR IRRITABLE: SEVERAL DAYS
GAD7 TOTAL SCORE: 3
2. NOT BEING ABLE TO STOP OR CONTROL WORRYING: NOT AT ALL
GAD7 TOTAL SCORE: 3
GAD7 TOTAL SCORE: 3
3. WORRYING TOO MUCH ABOUT DIFFERENT THINGS: NOT AT ALL
1. FEELING NERVOUS, ANXIOUS, OR ON EDGE: NOT AT ALL

## 2024-02-08 ASSESSMENT — PATIENT HEALTH QUESTIONNAIRE - PHQ9
10. IF YOU CHECKED OFF ANY PROBLEMS, HOW DIFFICULT HAVE THESE PROBLEMS MADE IT FOR YOU TO DO YOUR WORK, TAKE CARE OF THINGS AT HOME, OR GET ALONG WITH OTHER PEOPLE: SOMEWHAT DIFFICULT
SUM OF ALL RESPONSES TO PHQ QUESTIONS 1-9: 6
SUM OF ALL RESPONSES TO PHQ QUESTIONS 1-9: 6

## 2024-02-08 ASSESSMENT — PAIN SCALES - GENERAL: PAINLEVEL: SEVERE PAIN (6)

## 2024-02-18 ENCOUNTER — HEALTH MAINTENANCE LETTER (OUTPATIENT)
Age: 44
End: 2024-02-18

## 2024-03-04 ENCOUNTER — OFFICE VISIT (OUTPATIENT)
Dept: FAMILY MEDICINE | Facility: OTHER | Age: 44
End: 2024-03-04
Attending: FAMILY MEDICINE
Payer: COMMERCIAL

## 2024-03-04 ENCOUNTER — MYC MEDICAL ADVICE (OUTPATIENT)
Dept: FAMILY MEDICINE | Facility: OTHER | Age: 44
End: 2024-03-04

## 2024-03-04 VITALS
BODY MASS INDEX: 38.05 KG/M2 | OXYGEN SATURATION: 98 % | HEART RATE: 100 BPM | WEIGHT: 214.8 LBS | TEMPERATURE: 99.3 F | SYSTOLIC BLOOD PRESSURE: 118 MMHG | DIASTOLIC BLOOD PRESSURE: 70 MMHG | RESPIRATION RATE: 20 BRPM

## 2024-03-04 DIAGNOSIS — J02.0 ACUTE STREPTOCOCCAL PHARYNGITIS: Primary | ICD-10-CM

## 2024-03-04 DIAGNOSIS — J02.9 SORE THROAT: ICD-10-CM

## 2024-03-04 LAB — DEPRECATED S PYO AG THROAT QL EIA: POSITIVE

## 2024-03-04 PROCEDURE — 99213 OFFICE O/P EST LOW 20 MIN: CPT | Performed by: FAMILY MEDICINE

## 2024-03-04 PROCEDURE — 87880 STREP A ASSAY W/OPTIC: CPT | Performed by: FAMILY MEDICINE

## 2024-03-04 RX ORDER — CLINDAMYCIN HCL 300 MG
300 CAPSULE ORAL 3 TIMES DAILY
Qty: 30 CAPSULE | Refills: 0 | Status: SHIPPED | OUTPATIENT
Start: 2024-03-04 | End: 2024-03-14

## 2024-03-04 RX ORDER — FLUCONAZOLE 150 MG/1
150 TABLET ORAL ONCE
Qty: 1 TABLET | Refills: 1 | Status: SHIPPED | OUTPATIENT
Start: 2024-03-04 | End: 2024-03-04

## 2024-03-04 ASSESSMENT — PAIN SCALES - GENERAL: PAINLEVEL: SEVERE PAIN (7)

## 2024-03-04 NOTE — PROGRESS NOTES
Assessment & Plan     1. Acute streptococcal pharyngitis  Patient is pretty ill, she does have an allergy to penicillin, so we will use clindamycin.  Will also send in diflucan in case of symptoms.  Symptomatic cares reviewed - tylenol, ibuprofen, honey, salt water gargles, etc.  Follow-up if no improvement noted.  - clindamycin (CLEOCIN) 300 MG capsule; Take 1 capsule (300 mg) by mouth 3 times daily for 10 days  Dispense: 30 capsule; Refill: 0  - fluconazole (DIFLUCAN) 150 MG tablet; Take 1 tablet (150 mg) by mouth once for 1 dose  Dispense: 1 tablet; Refill: 1    2. Sore throat  - Streptococcus A Rapid Scr w Reflx to PCR ([place] [facility] Only)         Return if symptoms worsen or fail to improve.      Shannan Quiles is a 43 year old, presenting for the following health issues:  Pharyngitis    HPI     Acute Illness  Acute illness concerns: sore throat  Onset/Duration: 3 days  Symptoms:  Fever: No  Chills/Sweats: YES  Headache (location?): No  Sinus Pressure: No  Conjunctivitis:  No  Ear Pain: no  Rhinorrhea: No  Congestion: No  Sore Throat: YES  Cough: no  Wheeze: No  Decreased Appetite: YES  Nausea: No  Vomiting: No  Diarrhea: No  Dysuria/Freq.: No  Dysuria or Hematuria: No  Fatigue/Achiness: YES  Sick/Strep Exposure: No  Therapies tried and outcome: ibuprofen and tylenol          Review of Systems  Constitutional, HEENT, cardiovascular, pulmonary, gi and gu systems are negative, except as otherwise noted.      Objective    /70 (BP Location: Left arm, Patient Position: Sitting, Cuff Size: Adult Large)   Pulse 100   Temp 99.3  F (37.4  C) (Tympanic)   Resp 20   Wt 97.4 kg (214 lb 12.8 oz)   SpO2 98%   BMI 38.05 kg/m    Body mass index is 38.05 kg/m .  Physical Exam   GENERAL: alert and no distress  HENT: tonsillar erythema and tonsillar exudate  PSYCH: mentation appears normal, affect normal/bright    Results for orders placed or performed in visit on 03/04/24   Streptococcus A Rapid Scr w Reflx  to PCR ([facility] Only)     Status: Abnormal    Specimen: Throat; Swab   Result Value Ref Range    Group A Strep antigen Positive (A) Negative           Signed Electronically by: Ashley Allen MD

## 2024-05-08 ENCOUNTER — ANCILLARY PROCEDURE (OUTPATIENT)
Dept: MAMMOGRAPHY | Facility: OTHER | Age: 44
End: 2024-05-08
Attending: FAMILY MEDICINE
Payer: COMMERCIAL

## 2024-05-08 DIAGNOSIS — Z12.31 VISIT FOR SCREENING MAMMOGRAM: ICD-10-CM

## 2024-05-08 PROCEDURE — 77063 BREAST TOMOSYNTHESIS BI: CPT | Mod: TC | Performed by: RADIOLOGY

## 2024-05-08 PROCEDURE — 77067 SCR MAMMO BI INCL CAD: CPT | Mod: TC | Performed by: RADIOLOGY

## 2024-05-09 ENCOUNTER — TELEPHONE (OUTPATIENT)
Dept: MAMMOGRAPHY | Facility: OTHER | Age: 44
End: 2024-05-09

## 2024-06-03 DIAGNOSIS — E03.9 HYPOTHYROIDISM, UNSPECIFIED TYPE: ICD-10-CM

## 2024-06-04 NOTE — TELEPHONE ENCOUNTER
Synthroid      outing refill request to provider for review/approval because:  Thyroid Protocol Ovmrsy0306/03/2024 05:58 PM   Protocol Details Normal TSH on file in past 12 months     TSH   Date Value Ref Range Status   05/12/2023 1.13 0.30 - 4.20 uIU/mL Final   09/26/2022 3.00 0.40 - 4.00 mU/L Final   09/01/2020 1.46 0.40 - 4.00 mU/L Final

## 2024-06-04 NOTE — TELEPHONE ENCOUNTER
Synthroid      Last Written Prescription Date:  10/23/23  Last Fill Quantity: 90,   # refills: 1  Last Office Visit: 3/4/24  Future Office visit:       Routing refill request to provider for review/approval because:

## 2024-06-10 DIAGNOSIS — R05.9 COUGH: ICD-10-CM

## 2024-06-10 DIAGNOSIS — J01.91 ACUTE RECURRENT SINUSITIS, UNSPECIFIED LOCATION: ICD-10-CM

## 2024-06-10 RX ORDER — LEVOTHYROXINE SODIUM 100 MCG
TABLET ORAL
Qty: 90 TABLET | Refills: 0 | Status: SHIPPED | OUTPATIENT
Start: 2024-06-10 | End: 2024-08-19

## 2024-06-10 NOTE — TELEPHONE ENCOUNTER
Singulair  Last Written Prescription Date: 11/29/23  Last Fill Quantity: 90 # of Refills: 1  Last Office Visit: 5/3/22

## 2024-06-15 RX ORDER — MONTELUKAST SODIUM 10 MG/1
TABLET ORAL
Qty: 90 TABLET | Refills: 0 | OUTPATIENT
Start: 2024-06-15

## 2024-08-08 RX ORDER — BUPROPION HYDROCHLORIDE 150 MG/1
TABLET ORAL
Qty: 90 TABLET | Refills: 0 | Status: SHIPPED | OUTPATIENT
Start: 2024-08-08 | End: 2024-08-19

## 2024-08-08 NOTE — TELEPHONE ENCOUNTER
buPROPion (WELLBUTRIN XL) 150 MG 24 hr tablet 90 tablet 1 10/23/2023     Last Office Visit: 3/4/24     Future Office visit:    Next 5 appointments (look out 90 days)      Aug 19, 2024 11:00 AM  (Arrive by 10:45 AM)  Adult Preventative Visit with Ashley Allen MD  [facility] ([facility] [address]  [facility] [address]  [phone]             Routing refill request to provider for review/approval because:

## 2024-08-08 NOTE — TELEPHONE ENCOUNTER
Rx Protocol Bupropion [id number]/08/2024 11:27 AM   Protocol Details PHQ-9 score of less than 5 in past 6 months    Blood pressure on file in the past 12 months         4/17/2023     8:37 AM 12/8/2023     8:40 AM 2/8/2024     8:08 AM   PHQ   PHQ-9 Total Score 7 5 6   Q9: Thoughts of better off dead/self-harm past 2 weeks Not at all Not at all Not at all      BP Readings from Last 3 Encounters:   03/04/24 118/70   02/08/24 116/66   12/08/23 114/68

## 2024-08-19 ENCOUNTER — OFFICE VISIT (OUTPATIENT)
Dept: FAMILY MEDICINE | Facility: OTHER | Age: 44
End: 2024-08-19
Attending: FAMILY MEDICINE
Payer: COMMERCIAL

## 2024-08-19 VITALS
HEART RATE: 86 BPM | SYSTOLIC BLOOD PRESSURE: 115 MMHG | WEIGHT: 226.5 LBS | DIASTOLIC BLOOD PRESSURE: 74 MMHG | BODY MASS INDEX: 40.12 KG/M2 | TEMPERATURE: 97.8 F | OXYGEN SATURATION: 98 % | RESPIRATION RATE: 18 BRPM

## 2024-08-19 DIAGNOSIS — E03.9 HYPOTHYROIDISM, UNSPECIFIED TYPE: ICD-10-CM

## 2024-08-19 DIAGNOSIS — Z30.41 ENCOUNTER FOR SURVEILLANCE OF CONTRACEPTIVE PILLS: ICD-10-CM

## 2024-08-19 DIAGNOSIS — R23.8 DRY SCALP: ICD-10-CM

## 2024-08-19 DIAGNOSIS — J30.2 SEASONAL ALLERGIC RHINITIS, UNSPECIFIED TRIGGER: ICD-10-CM

## 2024-08-19 DIAGNOSIS — E28.2 PCOS (POLYCYSTIC OVARIAN SYNDROME): ICD-10-CM

## 2024-08-19 DIAGNOSIS — Z83.49 FAMILY HISTORY OF HEMOCHROMATOSIS: ICD-10-CM

## 2024-08-19 DIAGNOSIS — F33.1 MAJOR DEPRESSIVE DISORDER, RECURRENT EPISODE, MODERATE (H): ICD-10-CM

## 2024-08-19 DIAGNOSIS — Z00.00 ROUTINE GENERAL MEDICAL EXAMINATION AT A HEALTH CARE FACILITY: Primary | ICD-10-CM

## 2024-08-19 PROCEDURE — 99213 OFFICE O/P EST LOW 20 MIN: CPT | Mod: 25 | Performed by: FAMILY MEDICINE

## 2024-08-19 PROCEDURE — 99396 PREV VISIT EST AGE 40-64: CPT | Performed by: FAMILY MEDICINE

## 2024-08-19 RX ORDER — BUPROPION HYDROCHLORIDE 300 MG/1
TABLET ORAL
Qty: 90 TABLET | Refills: 3 | Status: SHIPPED | OUTPATIENT
Start: 2024-08-19

## 2024-08-19 RX ORDER — LEVOTHYROXINE SODIUM 100 MCG
100 TABLET ORAL DAILY
Qty: 90 TABLET | Refills: 3 | Status: SHIPPED | OUTPATIENT
Start: 2024-08-19

## 2024-08-19 RX ORDER — SERTRALINE HYDROCHLORIDE 100 MG/1
100 TABLET, FILM COATED ORAL DAILY
Qty: 90 TABLET | Refills: 3 | Status: SHIPPED | OUTPATIENT
Start: 2024-08-19

## 2024-08-19 RX ORDER — FLUOCINOLONE ACETONIDE 0.11 MG/ML
OIL TOPICAL WEEKLY
Qty: 118 ML | Refills: 2 | Status: SHIPPED | OUTPATIENT
Start: 2024-08-19

## 2024-08-19 RX ORDER — NORETHINDRONE ACETATE AND ETHINYL ESTRADIOL .02; 1 MG/1; MG/1
TABLET ORAL
Qty: 84 TABLET | Refills: 3 | Status: SHIPPED | OUTPATIENT
Start: 2024-08-19

## 2024-08-19 RX ORDER — BUPROPION HYDROCHLORIDE 150 MG/1
TABLET ORAL
Qty: 90 TABLET | Refills: 3 | Status: SHIPPED | OUTPATIENT
Start: 2024-08-19

## 2024-08-19 RX ORDER — MONTELUKAST SODIUM 10 MG/1
1 TABLET ORAL AT BEDTIME
Qty: 90 TABLET | Refills: 3 | Status: SHIPPED | OUTPATIENT
Start: 2024-08-19

## 2024-08-19 SDOH — HEALTH STABILITY: PHYSICAL HEALTH: ON AVERAGE, HOW MANY DAYS PER WEEK DO YOU ENGAGE IN MODERATE TO STRENUOUS EXERCISE (LIKE A BRISK WALK)?: 0 DAYS

## 2024-08-19 SDOH — HEALTH STABILITY: PHYSICAL HEALTH: ON AVERAGE, HOW MANY MINUTES DO YOU ENGAGE IN EXERCISE AT THIS LEVEL?: 0 MIN

## 2024-08-19 ASSESSMENT — ANXIETY QUESTIONNAIRES
3. WORRYING TOO MUCH ABOUT DIFFERENT THINGS: NOT AT ALL
IF YOU CHECKED OFF ANY PROBLEMS ON THIS QUESTIONNAIRE, HOW DIFFICULT HAVE THESE PROBLEMS MADE IT FOR YOU TO DO YOUR WORK, TAKE CARE OF THINGS AT HOME, OR GET ALONG WITH OTHER PEOPLE: SOMEWHAT DIFFICULT
5. BEING SO RESTLESS THAT IT IS HARD TO SIT STILL: SEVERAL DAYS
7. FEELING AFRAID AS IF SOMETHING AWFUL MIGHT HAPPEN: NOT AT ALL
4. TROUBLE RELAXING: SEVERAL DAYS
GAD7 TOTAL SCORE: 4
1. FEELING NERVOUS, ANXIOUS, OR ON EDGE: SEVERAL DAYS
7. FEELING AFRAID AS IF SOMETHING AWFUL MIGHT HAPPEN: NOT AT ALL
GAD7 TOTAL SCORE: 4
8. IF YOU CHECKED OFF ANY PROBLEMS, HOW DIFFICULT HAVE THESE MADE IT FOR YOU TO DO YOUR WORK, TAKE CARE OF THINGS AT HOME, OR GET ALONG WITH OTHER PEOPLE?: SOMEWHAT DIFFICULT
2. NOT BEING ABLE TO STOP OR CONTROL WORRYING: NOT AT ALL
6. BECOMING EASILY ANNOYED OR IRRITABLE: SEVERAL DAYS
GAD7 TOTAL SCORE: 4

## 2024-08-19 ASSESSMENT — ASTHMA QUESTIONNAIRES
QUESTION_1 LAST FOUR WEEKS HOW MUCH OF THE TIME DID YOUR ASTHMA KEEP YOU FROM GETTING AS MUCH DONE AT WORK, SCHOOL OR AT HOME: A LITTLE OF THE TIME
QUESTION_4 LAST FOUR WEEKS HOW OFTEN HAVE YOU USED YOUR RESCUE INHALER OR NEBULIZER MEDICATION (SUCH AS ALBUTEROL): TWO OR THREE TIMES PER WEEK
ACT_TOTALSCORE: 16
QUESTION_3 LAST FOUR WEEKS HOW OFTEN DID YOUR ASTHMA SYMPTOMS (WHEEZING, COUGHING, SHORTNESS OF BREATH, CHEST TIGHTNESS OR PAIN) WAKE YOU UP AT NIGHT OR EARLIER THAN USUAL IN THE MORNING: ONCE A WEEK
ACT_TOTALSCORE: 16
QUESTION_2 LAST FOUR WEEKS HOW OFTEN HAVE YOU HAD SHORTNESS OF BREATH: THREE TO SIX TIMES A WEEK
QUESTION_5 LAST FOUR WEEKS HOW WOULD YOU RATE YOUR ASTHMA CONTROL: SOMEWHAT CONTROLLED

## 2024-08-19 ASSESSMENT — SOCIAL DETERMINANTS OF HEALTH (SDOH): HOW OFTEN DO YOU GET TOGETHER WITH FRIENDS OR RELATIVES?: ONCE A WEEK

## 2024-08-19 ASSESSMENT — PATIENT HEALTH QUESTIONNAIRE - PHQ9
SUM OF ALL RESPONSES TO PHQ QUESTIONS 1-9: 8
10. IF YOU CHECKED OFF ANY PROBLEMS, HOW DIFFICULT HAVE THESE PROBLEMS MADE IT FOR YOU TO DO YOUR WORK, TAKE CARE OF THINGS AT HOME, OR GET ALONG WITH OTHER PEOPLE: SOMEWHAT DIFFICULT
SUM OF ALL RESPONSES TO PHQ QUESTIONS 1-9: 8

## 2024-08-19 ASSESSMENT — PAIN SCALES - GENERAL: PAINLEVEL: NO PAIN (0)

## 2024-08-19 NOTE — PROGRESS NOTES
"Preventive Care Visit  [facility]  Ashley Allen MD, Family Medicine  Aug 19, 2024      Assessment & Plan       ICD-10-CM    1. Routine general medical examination at a health care facility  Z00.00 Lipid Profile (Chol, Trig, HDL, LDL calc)     Glucose      2. Major depressive disorder, recurrent episode, moderate (H)  F33.1 buPROPion (WELLBUTRIN XL) 150 MG 24 hr tablet     buPROPion (WELLBUTRIN XL) 300 MG 24 hr tablet     sertraline (ZOLOFT) 100 MG tablet     Adult Mental Health  Referral      3. Hypothyroidism, unspecified type  E03.9 SYNTHROID 100 MCG tablet     TSH with free T4 reflex      4. Dry scalp  R23.8 Fluocinolone Acetonide Scalp (DERMA-SMOOTHE/FS SCALP) 0.01 % OIL oil      5. Seasonal allergic rhinitis, unspecified trigger  J30.2 montelukast (SINGULAIR) 10 MG tablet      6. Encounter for surveillance of contraceptive pills  Z30.41 norethindrone-ethinyl estradiol (MICROGESTIN 1/20) 1-20 MG-MCG tablet      7. Family history of hemochromatosis  Z83.49 Ferritin      8. PCOS (polycystic ovarian syndrome)  E28.2 Testosterone Free and Total        Patient does not feel her mental health is where is should be, and she feels like her medications aren't optimal.  Will refer to Mental Health for evaluation and consideration of medication change  Screening and chronic labs updated, medication refilled  Will add ferritin due to previously lower levels.  Will also check testosterone, as patient has had higher levels previously due to PCOS  Follow-up for physical on annual basis, every 6 months for chronic conditions    Patient has been advised of split billing requirements and indicates understanding: Yes        BMI  Estimated body mass index is 40.12 kg/m  as calculated from the following:    Height as of 2/8/24: 1.6 m (5' 3\").    Weight as of this encounter: 102.7 kg (226 lb 8 oz).   Weight management plan: Discussed healthy diet and exercise guidelines    Counseling  Appropriate preventive services " were addressed with this patient via screening, questionnaire, or discussion as appropriate for fall prevention, nutrition, physical activity, Tobacco-use cessation, social engagement, weight loss and cognition.  Checklist reviewing preventive services available has been given to the patient.  Reviewed patient's diet, addressing concerns and/or questions.   The patient's PHQ-9 score is consistent with mild depression. She was provided with information regarding depression.       Return in about 6 months (around 2/19/2025) for Chronic Disease Management.      Shannan Quiles is a 43 year old, presenting for the following:  Physical        8/19/2024    10:45 AM   Additional Questions   Roomed by Michelle HERNANDEZ   Accompanied by None        Health Care Directive  Patient does not have a Health Care Directive or Living Will: Discussed advance care planning with patient; however, patient declined at this time.    HPI    Depression and Anxiety   How are you doing with your depression since your last visit? Worsened slightly  How are you doing with your anxiety since your last visit?  Worsened slightly  Are you having other symptoms that might be associated with depression or anxiety? Yes:  weight gain  Have you had a significant life event? No   Do you have any concerns with your use of alcohol or other drugs? No    Social History     Tobacco Use    Smoking status: Never    Smokeless tobacco: Never   Vaping Use    Vaping status: Never Used   Substance Use Topics    Alcohol use: No    Drug use: Never         12/8/2023     8:40 AM 2/8/2024     8:08 AM 8/19/2024     9:50 AM   PHQ   PHQ-9 Total Score 5 6 8   Q9: Thoughts of better off dead/self-harm past 2 weeks Not at all Not at all Not at all         12/8/2023     8:41 AM 2/8/2024     8:08 AM 8/19/2024     9:52 AM   ARNALDO-7 SCORE   Total Score 3 (minimal anxiety) 3 (minimal anxiety) 4 (minimal anxiety)   Total Score 3 3 4         Suicide Assessment Five-step Evaluation and Treatment  (SAFE-T)    Hypothyroidism Follow-up    Since last visit, patient describes the following symptoms: weight gain        8/19/2024   General Health   How would you rate your overall physical health? Good   Feel stress (tense, anxious, or unable to sleep) Rather much      (!) STRESS CONCERN      8/19/2024   Nutrition   Three or more servings of calcium each day? Yes   Diet: Regular (no restrictions)   How many servings of fruit and vegetables per day? (!) 2-3   How many sweetened beverages each day? 0-1            8/19/2024   Exercise   Days per week of moderate/strenous exercise 0 days   Average minutes spent exercising at this level 0 min      (!) EXERCISE CONCERN      8/19/2024   Social Factors   Frequency of gathering with friends or relatives Once a week   Worry food won't last until get money to buy more No   Food not last or not have enough money for food? No   Do you have housing? (Housing is defined as stable permanent housing and does not include staying ouside in a car, in a tent, in an abandoned building, in an overnight shelter, or couch-surfing.) Yes   Are you worried about losing your housing? No   Lack of transportation? No   Unable to get utilities (heat,electricity)? No            8/19/2024   Dental   Dentist two times every year? Yes            8/19/2024   TB Screening   Were you born outside of the [place]? No          Today's PHQ-9 Score:       8/19/2024     9:50 AM   PHQ-9 SCORE   PHQ-9 Total Score MyChart 8 (Mild depression)   PHQ-9 Total Score 8         8/19/2024   Substance Use   Alcohol more than 3/day or more than 7/wk Not Applicable   Do you use any other substances recreationally? No        Social History     Tobacco Use    Smoking status: Never    Smokeless tobacco: Never   Vaping Use    Vaping status: Never Used   Substance Use Topics    Alcohol use: No    Drug use: Never           5/8/2024   LAST FHS-7 RESULTS   1st degree relative breast or ovarian cancer No   Any relative bilateral breast  cancer No   Any male have breast cancer No   Any ONE woman have BOTH breast AND ovarian cancer No   Any woman with breast cancer before 50yrs Yes   2 or more relatives with breast AND/OR ovarian cancer No   2 or more relatives with breast AND/OR bowel cancer No           Mammogram Screening - Mammogram every 1-2 years updated in Health Maintenance based on mutual decision making        2024   STI Screening   New sexual partner(s) since last STI/HIV test? No        History of abnormal Pap smear: No - age 30- 64 PAP with HPV every 5 years recommended        Latest Ref Rng & Units 2020     9:30 AM 2019     9:25 AM 2018     9:04 AM   PAP / HPV   PAP (Historical)  NIL  LSIL  NIL    HPV 16 DNA NEG^Negative Negative  Negative  Negative    HPV 18 DNA NEG^Negative Negative  Negative  Negative    Other HR HPV NEG^Negative Negative  Positive  Positive      ASCVD Risk   The 10-year ASCVD risk score (Lyle CORNELIUS, et al., 2019) is: 0.9%    Values used to calculate the score:      Age: 43 years      Sex: Female      Is Non- : No      Diabetic: No      Tobacco smoker: No      Systolic Blood Pressure: 115 mmHg      Is BP treated: No      HDL Cholesterol: 44 mg/dL      Total Cholesterol: 213 mg/dL        2024   Contraception/Family Planning   Questions about contraception or family planning No           Reviewed and updated as needed this visit by Provider   Tobacco  Allergies  Meds  Problems  Med Hx  Surg Hx  Fam Hx            Patient Active Problem List   Diagnosis    PCOS (polycystic ovarian syndrome)    Hirsutism    Elevated testosterone level in female    Ovarian hyperthecosis    History of pre-term labor    Hypothyroidism    Infertility, anovulation    PVC's (premature ventricular contractions)    H/O premature delivery    Bilateral carpal tunnel syndrome     (spontaneous vaginal delivery)    ACP (advance care planning)    Major depressive disorder, recurrent  episode, moderate (H)    Gynecomastia, female    High risk HPV infection    Papanicolaou smear of cervix with low grade squamous intraepithelial lesion (LGSIL)    Encounter for other general counseling or advice on contraception    Hyperandrogenemia syndrome, female, post pubertal    Constipation    History of gestational diabetes     Past Surgical History:   Procedure Laterality Date    BIOPSY  June 2019    Abnormal PAP    CARPAL TUNNEL RELEASE RT/LT Left 01/25/2022    [facility]    dental surgical procedure      LAPAROSCOPIC TUBAL DYE STUDY N/A 09/03/2014    Procedure: LAPAROSCOPIC TUBAL DYE STUDY;  Surgeon: Amanuel Purcell MD;  Location: [facility]    MAMMOPLASTY REDUCTION  10/2019       Social History     Tobacco Use    Smoking status: Never    Smokeless tobacco: Never   Substance Use Topics    Alcohol use: No     Family History   Problem Relation Age of Onset    Lipids Father         hyperlipidemia    Hypertension Father     Hyperlipidemia Father     Lipids Mother         hyperlipidemia    Lupus Mother         erythematosus    Osteoporosis Mother     Genetic Disorder Mother         Lupus, chronic kidney disease,    Diabetes Other     Thyroid Disease Other     Prostate Cancer Maternal Grandfather     Cerebrovascular Disease Maternal Grandmother     Breast Cancer Maternal Grandmother     Asthma No family hx of          Current Outpatient Medications   Medication Sig Dispense Refill    albuterol (PROAIR HFA/PROVENTIL HFA/VENTOLIN HFA) 108 (90 Base) MCG/ACT inhaler Inhale 2 puffs into the lungs every 4 hours as needed for shortness of breath, wheezing or cough 18 g 1    budesonide (PULMICORT) 0.5 MG/2ML neb solution Mix 240 ml [place] Med Sinus rinse, add 0.5 mg budesonide vial, rinse 1/2 bottle in each nostril twice daily 60 mL 4    buPROPion (WELLBUTRIN XL) 150 MG 24 hr tablet TAKE 1 TABLET BY MOUTH EVERY MORNING ALONG WITH 300MG TABLET FOR A TOTAL DAILY DOSE OF 450MG EVERY MORNING 90 tablet 3     "buPROPion (WELLBUTRIN XL) 300 MG 24 hr tablet TAKE 1 TABLET BY MOUTH EVERY MORNING WITH 150 MG TABLET=450 MG EVERY MORNING 90 tablet 3    Fluocinolone Acetonide Scalp (DERMA-SMOOTHE/FS SCALP) 0.01 % OIL oil Apply topically once a week 118 mL 2    montelukast (SINGULAIR) 10 MG tablet Take 1 tablet (10 mg) by mouth at bedtime 90 tablet 3    norethindrone-ethinyl estradiol (MICROGESTIN 1/20) 1-20 MG-MCG tablet TAKE 1 TABLET BY MOUTH EVERY DAY SKIPPING PLACEBO TABLETS 84 tablet 3    sertraline (ZOLOFT) 100 MG tablet Take 1 tablet (100 mg) by mouth daily 90 tablet 3    SYNTHROID 100 MCG tablet Take 1 tablet (100 mcg) by mouth daily 90 tablet 3    triamcinolone (KENALOG) 0.1 % external cream Apply topically 2 times daily as needed for irritation 45 g 1    vitamin D3 (CHOLECALCIFEROL) 50 mcg (2000 units) tablet Take 2 tablets (100 mcg) by mouth daily       Allergies   Allergen Reactions    Macrolides And Ketolides Hives     Macrolide antibiotics    Penicillins Hives    Sulfonamide Derivatives Hives     Sulfa         Review of Systems  Constitutional, HEENT, cardiovascular, pulmonary, gi and gu systems are negative, except as otherwise noted.     Objective    Exam  /74 (BP Location: Right arm, Patient Position: Sitting, Cuff Size: Adult Large)   Pulse 86   Temp 97.8  F (36.6  C) (Tympanic)   Resp 18   Wt 102.7 kg (226 lb 8 oz)   SpO2 98%   Breastfeeding No   BMI 40.12 kg/m     Estimated body mass index is 40.12 kg/m  as calculated from the following:    Height as of 2/8/24: 1.6 m (5' 3\").    Weight as of this encounter: 102.7 kg (226 lb 8 oz).    Physical Exam  GENERAL: alert and no distress  EYES: Eyes grossly normal to inspection, PERRL and conjunctivae and sclerae normal  HENT: ear canals and TM's normal, nose and mouth without ulcers or lesions  NECK: no adenopathy  RESP: lungs clear to auscultation - no rales, rhonchi or wheezes  CV: regular rates and rhythm, normal S1 S2, no S3 or S4, and no murmur, click " or rub  ABDOMEN: soft, nontender, no hepatosplenomegaly, no masses and bowel sounds normal  MS: no gross musculoskeletal defects noted, no edema  SKIN: no suspicious lesions or rashes  NEURO: Normal strength and tone, mentation intact and speech normal  PSYCH: mentation appears normal, affect normal/bright        Signed Electronically by: Ashley Allen MD    Answers submitted by the patient for this visit:  Patient Health Questionnaire (Submitted on 8/19/2024)  If you checked off any problems, how difficult have these problems made it for you to do your work, take care of things at home, or get along with other people?: Somewhat difficult  PHQ9 TOTAL SCORE: 8  ARNALDO-7 (Submitted on 8/19/2024)  ARNALDO 7 TOTAL SCORE: 4

## 2024-08-20 ENCOUNTER — LAB (OUTPATIENT)
Dept: LAB | Facility: OTHER | Age: 44
End: 2024-08-20
Payer: COMMERCIAL

## 2024-08-20 DIAGNOSIS — Z00.00 ROUTINE GENERAL MEDICAL EXAMINATION AT A HEALTH CARE FACILITY: ICD-10-CM

## 2024-08-20 DIAGNOSIS — Z83.49 FAMILY HISTORY OF HEMOCHROMATOSIS: ICD-10-CM

## 2024-08-20 DIAGNOSIS — E28.2 PCOS (POLYCYSTIC OVARIAN SYNDROME): ICD-10-CM

## 2024-08-20 DIAGNOSIS — E03.9 HYPOTHYROIDISM, UNSPECIFIED TYPE: ICD-10-CM

## 2024-08-20 LAB
CHOLEST SERPL-MCNC: 213 MG/DL
FASTING STATUS PATIENT QL REPORTED: YES
FASTING STATUS PATIENT QL REPORTED: YES
FERRITIN SERPL-MCNC: 142 NG/ML (ref 6–175)
GLUCOSE SERPL-MCNC: 102 MG/DL (ref 70–99)
HDLC SERPL-MCNC: 44 MG/DL
HOLD SPECIMEN: NORMAL
LDLC SERPL CALC-MCNC: 130 MG/DL
NONHDLC SERPL-MCNC: 169 MG/DL
SHBG SERPL-SCNC: 95 NMOL/L (ref 30–135)
TRIGL SERPL-MCNC: 197 MG/DL
TSH SERPL DL<=0.005 MIU/L-ACNC: 2.11 UIU/ML (ref 0.3–4.2)

## 2024-08-20 PROCEDURE — 84403 ASSAY OF TOTAL TESTOSTERONE: CPT

## 2024-08-20 PROCEDURE — 84443 ASSAY THYROID STIM HORMONE: CPT

## 2024-08-20 PROCEDURE — 36415 COLL VENOUS BLD VENIPUNCTURE: CPT

## 2024-08-20 PROCEDURE — 80061 LIPID PANEL: CPT

## 2024-08-20 PROCEDURE — 82947 ASSAY GLUCOSE BLOOD QUANT: CPT

## 2024-08-20 PROCEDURE — 82728 ASSAY OF FERRITIN: CPT

## 2024-08-20 PROCEDURE — 84270 ASSAY OF SEX HORMONE GLOBUL: CPT

## 2024-08-23 LAB
TESTOST FREE SERPL-MCNC: 0.17 NG/DL
TESTOST SERPL-MCNC: 20 NG/DL (ref 8–60)

## 2024-10-28 ENCOUNTER — E-VISIT (OUTPATIENT)
Dept: FAMILY MEDICINE | Facility: OTHER | Age: 44
End: 2024-10-28
Attending: FAMILY MEDICINE
Payer: COMMERCIAL

## 2024-10-28 DIAGNOSIS — B96.89 ACUTE BACTERIAL SINUSITIS: Primary | ICD-10-CM

## 2024-10-28 DIAGNOSIS — J01.90 ACUTE BACTERIAL SINUSITIS: Primary | ICD-10-CM

## 2024-10-28 PROCEDURE — 99421 OL DIG E/M SVC 5-10 MIN: CPT | Performed by: FAMILY MEDICINE

## 2024-10-28 RX ORDER — DOXYCYCLINE HYCLATE 100 MG
100 TABLET ORAL 2 TIMES DAILY
Qty: 20 TABLET | Refills: 0 | Status: SHIPPED | OUTPATIENT
Start: 2024-10-28 | End: 2024-11-07

## 2024-10-28 NOTE — TELEPHONE ENCOUNTER
Provider E-Visit time total (minutes): 4    1. Acute bacterial sinusitis (Primary)  Antibiotics sent, follow-up if no improvement noted.  - doxycycline hyclate (VIBRA-TABS) 100 MG tablet; Take 1 tablet (100 mg) by mouth 2 times daily for 10 days.  Dispense: 20 tablet; Refill: 0     Ashley Allen MD

## 2024-10-28 NOTE — PATIENT INSTRUCTIONS
Dear Etta Obrien    After reviewing your responses, I've been able to diagnose you with Acute bacterial sinusitis.      Based on your responses and diagnosis, I have prescribed   Orders Placed This Encounter   Medications    doxycycline hyclate (VIBRA-TABS) 100 MG tablet     Sig: Take 1 tablet (100 mg) by mouth 2 times daily for 10 days.     Dispense:  20 tablet     Refill:  0     May substitute any formulation of 100mg doxycycline available and best covered by insurance      to treat your symptoms. I have sent this to your pharmacy.?     It is also important to stay well hydrated, get lots of rest and take over-the-counter decongestants,?tylenol?or ibuprofen if you?are able to?take those medications per your primary care provider to help relieve discomfort.?     It is important that you take?all of?your prescribed medication even if your symptoms are improving after a few doses.? Taking?all of?your medicine helps prevent the symptoms from returning.?     If your symptoms worsen, you develop severe headache, vomiting, high fever (>102), or are not improving in 7 days, please contact your primary care provider for an appointment or visit any of our convenient Walk-in Care or Urgent Care Centers to be seen which can be found on our website?here.?     Thanks again for choosing?us?as your health care partner,?   ?  Ashley Allen MD?

## 2025-01-30 ENCOUNTER — MYC MEDICAL ADVICE (OUTPATIENT)
Dept: FAMILY MEDICINE | Facility: OTHER | Age: 45
End: 2025-01-30

## 2025-01-30 ASSESSMENT — ASTHMA QUESTIONNAIRES
ACT_TOTALSCORE: 19
ACT_TOTALSCORE: 19
QUESTION_4 LAST FOUR WEEKS HOW OFTEN HAVE YOU USED YOUR RESCUE INHALER OR NEBULIZER MEDICATION (SUCH AS ALBUTEROL): TWO OR THREE TIMES PER WEEK
QUESTION_2 LAST FOUR WEEKS HOW OFTEN HAVE YOU HAD SHORTNESS OF BREATH: ONCE OR TWICE A WEEK
QUESTION_3 LAST FOUR WEEKS HOW OFTEN DID YOUR ASTHMA SYMPTOMS (WHEEZING, COUGHING, SHORTNESS OF BREATH, CHEST TIGHTNESS OR PAIN) WAKE YOU UP AT NIGHT OR EARLIER THAN USUAL IN THE MORNING: NOT AT ALL
QUESTION_1 LAST FOUR WEEKS HOW MUCH OF THE TIME DID YOUR ASTHMA KEEP YOU FROM GETTING AS MUCH DONE AT WORK, SCHOOL OR AT HOME: SOME OF THE TIME
QUESTION_5 LAST FOUR WEEKS HOW WOULD YOU RATE YOUR ASTHMA CONTROL: WELL CONTROLLED

## 2025-01-30 NOTE — TELEPHONE ENCOUNTER
[facility] quality measure patient outreach. This writer notes that patient reports asthma is  well controlled  presently with total ACT score is 19, which falls just below the quality measure goal of >=20. This patient does not have any future scheduled Primary Care appointments at this time.         2/8/2024     8:10 AM 8/19/2024     9:58 AM 1/30/2025    11:47 AM   ACT Total Scores   ACT TOTAL SCORE (Goal Greater than or Equal to 20) 21 16 19    In the past 12 months, how many times did you visit the emergency room for your asthma without being admitted to the hospital? 0 0 0   In the past 12 months, how many times were you hospitalized overnight because of your asthma? 0 0 0       Patient-reported        Luly Bowen RN

## 2025-04-01 ENCOUNTER — MYC MEDICAL ADVICE (OUTPATIENT)
Dept: FAMILY MEDICINE | Facility: OTHER | Age: 45
End: 2025-04-01

## 2025-04-01 ASSESSMENT — ASTHMA QUESTIONNAIRES
ACT_TOTALSCORE: 21
QUESTION_2 LAST FOUR WEEKS HOW OFTEN HAVE YOU HAD SHORTNESS OF BREATH: ONCE OR TWICE A WEEK
QUESTION_5 LAST FOUR WEEKS HOW WOULD YOU RATE YOUR ASTHMA CONTROL: WELL CONTROLLED
QUESTION_4 LAST FOUR WEEKS HOW OFTEN HAVE YOU USED YOUR RESCUE INHALER OR NEBULIZER MEDICATION (SUCH AS ALBUTEROL): ONCE A WEEK OR LESS
QUESTION_1 LAST FOUR WEEKS HOW MUCH OF THE TIME DID YOUR ASTHMA KEEP YOU FROM GETTING AS MUCH DONE AT WORK, SCHOOL OR AT HOME: A LITTLE OF THE TIME
QUESTION_3 LAST FOUR WEEKS HOW OFTEN DID YOUR ASTHMA SYMPTOMS (WHEEZING, COUGHING, SHORTNESS OF BREATH, CHEST TIGHTNESS OR PAIN) WAKE YOU UP AT NIGHT OR EARLIER THAN USUAL IN THE MORNING: NOT AT ALL

## 2025-04-01 NOTE — TELEPHONE ENCOUNTER
Patient completed [facility] ACT questionnaire on 4/1/2025 for [facility] quality patient outreach. Currently meeting goal parameters with an ACT total score >= 20. This writer does not note any major respiratory or safety issues at present. Continue with current plan of care.         8/19/2024     9:58 AM 1/30/2025    11:47 AM 4/1/2025     9:50 AM   ACT Total Scores   ACT TOTAL SCORE (Goal Greater than or Equal to 20) 16 19  21    In the past 12 months, how many times did you visit the emergency room for your asthma without being admitted to the hospital? 0 0 0   In the past 12 months, how many times were you hospitalized overnight because of your asthma? 0 0 0       Patient-reported        Luly Bowen RN

## 2025-07-21 ENCOUNTER — PATIENT OUTREACH (OUTPATIENT)
Dept: CARE COORDINATION | Facility: CLINIC | Age: 45
End: 2025-07-21
Payer: COMMERCIAL

## 2025-08-04 ENCOUNTER — PATIENT OUTREACH (OUTPATIENT)
Dept: CARE COORDINATION | Facility: CLINIC | Age: 45
End: 2025-08-04

## 2025-08-09 DIAGNOSIS — Z30.41 ENCOUNTER FOR SURVEILLANCE OF CONTRACEPTIVE PILLS: ICD-10-CM

## 2025-08-11 DIAGNOSIS — J30.2 SEASONAL ALLERGIC RHINITIS, UNSPECIFIED TRIGGER: ICD-10-CM

## 2025-08-11 DIAGNOSIS — F33.1 MAJOR DEPRESSIVE DISORDER, RECURRENT EPISODE, MODERATE (H): ICD-10-CM

## 2025-08-11 RX ORDER — BUPROPION HYDROCHLORIDE 150 MG/1
TABLET ORAL
Qty: 90 TABLET | Refills: 0 | Status: SHIPPED | OUTPATIENT
Start: 2025-08-11

## 2025-08-11 RX ORDER — NORETHINDRONE ACETATE AND ETHINYL ESTRADIOL .02; 1 MG/1; MG/1
TABLET ORAL
Qty: 84 TABLET | Refills: 0 | Status: SHIPPED | OUTPATIENT
Start: 2025-08-11

## 2025-08-11 RX ORDER — MONTELUKAST SODIUM 10 MG/1
1 TABLET ORAL AT BEDTIME
Qty: 90 TABLET | Refills: 0 | Status: SHIPPED | OUTPATIENT
Start: 2025-08-11

## 2025-08-15 ENCOUNTER — LAB (OUTPATIENT)
Dept: LAB | Facility: OTHER | Age: 45
End: 2025-08-15
Payer: COMMERCIAL

## 2025-08-15 DIAGNOSIS — R19.7 DIARRHEA, UNSPECIFIED TYPE: ICD-10-CM

## 2025-08-15 LAB
ALBUMIN SERPL BCG-MCNC: 4.1 G/DL (ref 3.5–5.2)
ALP SERPL-CCNC: 88 U/L (ref 40–150)
ALT SERPL W P-5'-P-CCNC: 22 U/L (ref 0–50)
ANION GAP SERPL CALCULATED.3IONS-SCNC: 12 MMOL/L (ref 7–15)
AST SERPL W P-5'-P-CCNC: 21 U/L (ref 0–45)
BASOPHILS # BLD AUTO: 0.05 10E3/UL (ref 0–0.2)
BASOPHILS NFR BLD AUTO: 0.5 %
BILIRUB SERPL-MCNC: 0.3 MG/DL
BUN SERPL-MCNC: 15.1 MG/DL (ref 6–20)
C DIFF TOX B STL QL: NEGATIVE
CALCIUM SERPL-MCNC: 9.4 MG/DL (ref 8.8–10.4)
CHLORIDE SERPL-SCNC: 106 MMOL/L (ref 98–107)
CREAT SERPL-MCNC: 0.84 MG/DL (ref 0.51–0.95)
EGFRCR SERPLBLD CKD-EPI 2021: 88 ML/MIN/1.73M2
EOSINOPHIL # BLD AUTO: 0.26 10E3/UL (ref 0–0.7)
EOSINOPHIL NFR BLD AUTO: 2.7 %
ERYTHROCYTE [DISTWIDTH] IN BLOOD BY AUTOMATED COUNT: 11.8 % (ref 10–15)
GLUCOSE SERPL-MCNC: 92 MG/DL (ref 70–99)
HCO3 SERPL-SCNC: 23 MMOL/L (ref 22–29)
HCT VFR BLD AUTO: 40.3 % (ref 35–47)
HGB BLD-MCNC: 13.9 G/DL (ref 11.7–15.7)
IMM GRANULOCYTES # BLD: <0.04 10E3/UL
IMM GRANULOCYTES NFR BLD: 0.2 %
LYMPHOCYTES # BLD AUTO: 3.33 10E3/UL (ref 0.8–5.3)
LYMPHOCYTES NFR BLD AUTO: 34.2 %
MCH RBC QN AUTO: 30.6 PG (ref 26.5–33)
MCHC RBC AUTO-ENTMCNC: 34.5 G/DL (ref 31.5–36.5)
MCV RBC AUTO: 88.8 FL (ref 78–100)
MONOCYTES # BLD AUTO: 0.65 10E3/UL (ref 0–1.3)
MONOCYTES NFR BLD AUTO: 6.7 %
NEUTROPHILS # BLD AUTO: 5.44 10E3/UL (ref 1.6–8.3)
NEUTROPHILS NFR BLD AUTO: 55.7 %
PLATELET # BLD AUTO: 285 10E3/UL (ref 150–450)
POTASSIUM SERPL-SCNC: 4.6 MMOL/L (ref 3.4–5.3)
PROT SERPL-MCNC: 6.6 G/DL (ref 6.4–8.3)
RBC # BLD AUTO: 4.54 10E6/UL (ref 3.8–5.2)
SODIUM SERPL-SCNC: 141 MMOL/L (ref 135–145)
WBC # BLD AUTO: 9.75 10E3/UL (ref 4–11)

## 2025-08-15 PROCEDURE — 85025 COMPLETE CBC W/AUTO DIFF WBC: CPT

## 2025-08-15 PROCEDURE — 87493 C DIFF AMPLIFIED PROBE: CPT

## 2025-08-15 PROCEDURE — 36415 COLL VENOUS BLD VENIPUNCTURE: CPT

## 2025-08-15 PROCEDURE — 80053 COMPREHEN METABOLIC PANEL: CPT

## 2025-08-16 LAB
C CAYETANENSIS DNA STL QL NAA+NON-PROBE: NEGATIVE
CAMPYLOBACTER DNA SPEC NAA+PROBE: NEGATIVE
CRYPTOSP DNA STL QL NAA+NON-PROBE: NEGATIVE
EC STX1+STX2 GENES STL QL NAA+NON-PROBE: NEGATIVE
G LAMBLIA DNA STL QL NAA+NON-PROBE: NEGATIVE
NOROVIRUS GI+II RNA STL QL NAA+NON-PROBE: NEGATIVE
SALMONELLA SP RPOD STL QL NAA+PROBE: NEGATIVE
SHIGELLA SP+EIEC IPAH ST NAA+NON-PROBE: NEGATIVE
VIBRIO DNA SPEC NAA+PROBE: NEGATIVE
Y ENTEROCOL DNA STL QL NAA+PROBE: NEGATIVE